# Patient Record
Sex: MALE | Race: WHITE | NOT HISPANIC OR LATINO | Employment: OTHER | URBAN - METROPOLITAN AREA
[De-identification: names, ages, dates, MRNs, and addresses within clinical notes are randomized per-mention and may not be internally consistent; named-entity substitution may affect disease eponyms.]

---

## 2018-01-24 ENCOUNTER — OFFICE VISIT CONVERTED (OUTPATIENT)
Dept: FAMILY MEDICINE CLINIC | Facility: CLINIC | Age: 68
End: 2018-01-24
Attending: NURSE PRACTITIONER

## 2018-01-24 ENCOUNTER — CONVERSION ENCOUNTER (OUTPATIENT)
Dept: FAMILY MEDICINE CLINIC | Facility: CLINIC | Age: 68
End: 2018-01-24

## 2018-03-19 ENCOUNTER — OFFICE VISIT CONVERTED (OUTPATIENT)
Dept: FAMILY MEDICINE CLINIC | Facility: CLINIC | Age: 68
End: 2018-03-19
Attending: NURSE PRACTITIONER

## 2018-07-24 ENCOUNTER — OFFICE VISIT CONVERTED (OUTPATIENT)
Dept: FAMILY MEDICINE CLINIC | Facility: CLINIC | Age: 68
End: 2018-07-24
Attending: NURSE PRACTITIONER

## 2018-09-10 ENCOUNTER — OFFICE VISIT CONVERTED (OUTPATIENT)
Dept: NEUROSURGERY | Facility: CLINIC | Age: 68
End: 2018-09-10
Attending: PHYSICIAN ASSISTANT

## 2018-09-24 ENCOUNTER — OFFICE VISIT CONVERTED (OUTPATIENT)
Dept: FAMILY MEDICINE CLINIC | Facility: CLINIC | Age: 68
End: 2018-09-24
Attending: NURSE PRACTITIONER

## 2018-09-24 ENCOUNTER — CONVERSION ENCOUNTER (OUTPATIENT)
Dept: FAMILY MEDICINE CLINIC | Facility: CLINIC | Age: 68
End: 2018-09-24

## 2018-11-12 ENCOUNTER — OFFICE VISIT CONVERTED (OUTPATIENT)
Dept: NEUROSURGERY | Facility: CLINIC | Age: 68
End: 2018-11-12
Attending: PHYSICIAN ASSISTANT

## 2019-01-14 ENCOUNTER — OFFICE VISIT CONVERTED (OUTPATIENT)
Dept: NEUROSURGERY | Facility: CLINIC | Age: 69
End: 2019-01-14
Attending: PHYSICIAN ASSISTANT

## 2019-01-22 ENCOUNTER — HOSPITAL ENCOUNTER (OUTPATIENT)
Dept: LAB | Facility: HOSPITAL | Age: 69
Discharge: HOME | End: 2019-01-22
Attending: NURSE PRACTITIONER

## 2019-01-22 LAB
ALBUMIN SERPL-MCNC: 4.4 G/DL (ref 3.5–5)
ALBUMIN/GLOB SERPL: 1.5 {RATIO} (ref 1.4–2.6)
ALP SERPL-CCNC: 79 U/L (ref 56–155)
ALT SERPL-CCNC: 16 U/L (ref 10–40)
ANION GAP SERPL CALC-SCNC: 19 MMOL/L (ref 8–19)
AST SERPL-CCNC: 20 U/L (ref 15–50)
BASOPHILS # BLD AUTO: 0.04 10*3/UL (ref 0–0.2)
BASOPHILS NFR BLD AUTO: 0.54 % (ref 0–3)
BILIRUB SERPL-MCNC: 0.52 MG/DL (ref 0.2–1.3)
BUN SERPL-MCNC: 11 MG/DL (ref 5–25)
BUN/CREAT SERPL: 12 {RATIO} (ref 6–20)
CALCIUM SERPL-MCNC: 9.6 MG/DL (ref 8.7–10.4)
CHLORIDE SERPL-SCNC: 106 MMOL/L (ref 99–111)
CHOLEST SERPL-MCNC: 125 MG/DL (ref 107–200)
CHOLEST/HDLC SERPL: 3.1 {RATIO} (ref 3–6)
CONV CO2: 21 MMOL/L (ref 22–32)
CONV TOTAL PROTEIN: 7.4 G/DL (ref 6.3–8.2)
CREAT UR-MCNC: 0.9 MG/DL (ref 0.7–1.2)
EOSINOPHIL # BLD AUTO: 0.19 10*3/UL (ref 0–0.7)
EOSINOPHIL # BLD AUTO: 2.3 % (ref 0–7)
ERYTHROCYTE [DISTWIDTH] IN BLOOD BY AUTOMATED COUNT: 13.5 % (ref 11.5–14.5)
EST. AVERAGE GLUCOSE BLD GHB EST-MCNC: 128 MG/DL
GFR SERPLBLD BASED ON 1.73 SQ M-ARVRAT: >60 ML/MIN/{1.73_M2}
GLOBULIN UR ELPH-MCNC: 3 G/DL (ref 2–3.5)
GLUCOSE SERPL-MCNC: 141 MG/DL (ref 70–99)
HBA1C MFR BLD: 14 G/DL (ref 14–18)
HBA1C MFR BLD: 6.1 % (ref 3.5–5.7)
HCT VFR BLD AUTO: 41.5 % (ref 42–52)
HDLC SERPL-MCNC: 40 MG/DL (ref 40–60)
LDLC SERPL CALC-MCNC: 59 MG/DL (ref 70–100)
LYMPHOCYTES # BLD AUTO: 1.75 10*3/UL (ref 1–5)
MCH RBC QN AUTO: 29.7 PG (ref 27–31)
MCHC RBC AUTO-ENTMCNC: 33.8 G/DL (ref 33–37)
MCV RBC AUTO: 87.8 FL (ref 80–96)
MONOCYTES # BLD AUTO: 1.06 10*3/UL (ref 0.2–1.2)
MONOCYTES NFR BLD AUTO: 13.2 % (ref 3–10)
NEUTROPHILS # BLD AUTO: 4.98 10*3/UL (ref 2–8)
NEUTROPHILS NFR BLD AUTO: 62.1 % (ref 30–85)
NRBC BLD AUTO-RTO: 0 % (ref 0–0.01)
OSMOLALITY SERPL CALC.SUM OF ELEC: 296 MOSM/KG (ref 273–304)
PLATELET # BLD AUTO: 187 10*3/UL (ref 130–400)
PMV BLD AUTO: 9.3 FL (ref 7.4–10.4)
POTASSIUM SERPL-SCNC: 4.2 MMOL/L (ref 3.5–5.3)
RBC # BLD AUTO: 4.72 10*6/UL (ref 4.7–6.1)
SODIUM SERPL-SCNC: 142 MMOL/L (ref 135–147)
T4 FREE SERPL-MCNC: 1.2 NG/DL (ref 0.9–1.8)
TRIGL SERPL-MCNC: 131 MG/DL (ref 40–150)
TSH SERPL-ACNC: 3 M[IU]/L (ref 0.27–4.2)
VARIANT LYMPHS NFR BLD MANUAL: 21.8 % (ref 20–45)
VLDLC SERPL-MCNC: 26 MG/DL (ref 5–37)
WBC # BLD AUTO: 8.02 10*3/UL (ref 4.8–10.8)

## 2019-02-05 ENCOUNTER — OFFICE VISIT CONVERTED (OUTPATIENT)
Dept: FAMILY MEDICINE CLINIC | Facility: CLINIC | Age: 69
End: 2019-02-05
Attending: NURSE PRACTITIONER

## 2019-02-05 ENCOUNTER — CONVERSION ENCOUNTER (OUTPATIENT)
Dept: FAMILY MEDICINE CLINIC | Facility: CLINIC | Age: 69
End: 2019-02-05

## 2019-03-25 ENCOUNTER — OFFICE VISIT CONVERTED (OUTPATIENT)
Dept: NEUROSURGERY | Facility: CLINIC | Age: 69
End: 2019-03-25
Attending: PHYSICIAN ASSISTANT

## 2019-04-18 ENCOUNTER — OFFICE VISIT CONVERTED (OUTPATIENT)
Dept: NEUROSURGERY | Facility: CLINIC | Age: 69
End: 2019-04-18
Attending: PHYSICIAN ASSISTANT

## 2019-04-25 ENCOUNTER — OFFICE VISIT CONVERTED (OUTPATIENT)
Dept: NEUROSURGERY | Facility: CLINIC | Age: 69
End: 2019-04-25
Attending: NEUROLOGICAL SURGERY

## 2019-04-25 ENCOUNTER — CONVERSION ENCOUNTER (OUTPATIENT)
Dept: NEUROLOGY | Facility: CLINIC | Age: 69
End: 2019-04-25

## 2019-08-01 ENCOUNTER — HOSPITAL ENCOUNTER (OUTPATIENT)
Dept: LAB | Facility: HOSPITAL | Age: 69
Discharge: HOME | End: 2019-08-01
Attending: NURSE PRACTITIONER

## 2019-08-01 LAB
ALBUMIN SERPL-MCNC: 4.4 G/DL (ref 3.5–5)
ALBUMIN/GLOB SERPL: 1.3 {RATIO} (ref 1.4–2.6)
ALP SERPL-CCNC: 83 U/L (ref 56–155)
ALT SERPL-CCNC: 19 U/L (ref 10–40)
ANION GAP SERPL CALC-SCNC: 23 MMOL/L (ref 8–19)
AST SERPL-CCNC: 27 U/L (ref 15–50)
BASOPHILS # BLD AUTO: 0.07 10*3/UL (ref 0–0.2)
BASOPHILS NFR BLD AUTO: 0.9 % (ref 0–3)
BILIRUB SERPL-MCNC: 0.47 MG/DL (ref 0.2–1.3)
BUN SERPL-MCNC: 15 MG/DL (ref 5–25)
BUN/CREAT SERPL: 19 {RATIO} (ref 6–20)
CALCIUM SERPL-MCNC: 9.6 MG/DL (ref 8.7–10.4)
CHLORIDE SERPL-SCNC: 101 MMOL/L (ref 99–111)
CHOLEST SERPL-MCNC: 130 MG/DL (ref 107–200)
CHOLEST/HDLC SERPL: 2.9 {RATIO} (ref 3–6)
CONV ABS IMM GRAN: 0.03 10*3/UL (ref 0–0.2)
CONV CO2: 20 MMOL/L (ref 22–32)
CONV CREATININE URINE, RANDOM: 90.7 MG/DL (ref 10–300)
CONV IMMATURE GRAN: 0.4 % (ref 0–1.8)
CONV MICROALBUM.,U,RANDOM: <12 MG/L (ref 0–20)
CONV TOTAL PROTEIN: 7.7 G/DL (ref 6.3–8.2)
CREAT UR-MCNC: 0.81 MG/DL (ref 0.7–1.2)
DEPRECATED RDW RBC AUTO: 45 FL (ref 35.1–43.9)
EOSINOPHIL # BLD AUTO: 0.23 10*3/UL (ref 0–0.7)
EOSINOPHIL # BLD AUTO: 2.8 % (ref 0–7)
ERYTHROCYTE [DISTWIDTH] IN BLOOD BY AUTOMATED COUNT: 13.9 % (ref 11.6–14.4)
EST. AVERAGE GLUCOSE BLD GHB EST-MCNC: 128 MG/DL
GFR SERPLBLD BASED ON 1.73 SQ M-ARVRAT: >60 ML/MIN/{1.73_M2}
GLOBULIN UR ELPH-MCNC: 3.3 G/DL (ref 2–3.5)
GLUCOSE SERPL-MCNC: 119 MG/DL (ref 70–99)
HBA1C MFR BLD: 13.9 G/DL (ref 14–18)
HBA1C MFR BLD: 6.1 % (ref 3.5–5.7)
HCT VFR BLD AUTO: 43.3 % (ref 42–52)
HDLC SERPL-MCNC: 45 MG/DL (ref 40–60)
LDLC SERPL CALC-MCNC: 67 MG/DL (ref 70–100)
LYMPHOCYTES # BLD AUTO: 1.93 10*3/UL (ref 1–5)
MCH RBC QN AUTO: 28.7 PG (ref 27–31)
MCHC RBC AUTO-ENTMCNC: 32.1 G/DL (ref 33–37)
MCV RBC AUTO: 89.3 FL (ref 80–96)
MICROALBUMIN/CREAT UR: 13.2 MG/G{CRE} (ref 0–25)
MONOCYTES # BLD AUTO: 1.13 10*3/UL (ref 0.2–1.2)
MONOCYTES NFR BLD AUTO: 13.9 % (ref 3–10)
NEUTROPHILS # BLD AUTO: 4.76 10*3/UL (ref 2–8)
NEUTROPHILS NFR BLD AUTO: 58.3 % (ref 30–85)
NRBC CBCN: 0 % (ref 0–0.7)
OSMOLALITY SERPL CALC.SUM OF ELEC: 292 MOSM/KG (ref 273–304)
PLATELET # BLD AUTO: 212 10*3/UL (ref 130–400)
PMV BLD AUTO: 12.2 FL (ref 9.4–12.4)
POTASSIUM SERPL-SCNC: 4.2 MMOL/L (ref 3.5–5.3)
PSA SERPL-MCNC: 0.1 NG/ML (ref 0–4)
RBC # BLD AUTO: 4.85 10*6/UL (ref 4.7–6.1)
SODIUM SERPL-SCNC: 140 MMOL/L (ref 135–147)
T4 FREE SERPL-MCNC: 1 NG/DL (ref 0.9–1.8)
TRIGL SERPL-MCNC: 92 MG/DL (ref 40–150)
TSH SERPL-ACNC: 4.33 M[IU]/L (ref 0.27–4.2)
VARIANT LYMPHS NFR BLD MANUAL: 23.7 % (ref 20–45)
VLDLC SERPL-MCNC: 18 MG/DL (ref 5–37)
WBC # BLD AUTO: 8.15 10*3/UL (ref 4.8–10.8)

## 2019-08-05 ENCOUNTER — OFFICE VISIT CONVERTED (OUTPATIENT)
Dept: FAMILY MEDICINE CLINIC | Facility: CLINIC | Age: 69
End: 2019-08-05
Attending: NURSE PRACTITIONER

## 2020-01-30 ENCOUNTER — HOSPITAL ENCOUNTER (OUTPATIENT)
Dept: LAB | Facility: HOSPITAL | Age: 70
Discharge: HOME | End: 2020-01-30
Attending: NURSE PRACTITIONER

## 2020-01-30 LAB
ALBUMIN SERPL-MCNC: 4.2 G/DL (ref 3.5–5)
ALBUMIN/GLOB SERPL: 1.4 {RATIO} (ref 1.4–2.6)
ALP SERPL-CCNC: 75 U/L (ref 56–155)
ALT SERPL-CCNC: 15 U/L (ref 10–40)
ANION GAP SERPL CALC-SCNC: 17 MMOL/L (ref 8–19)
AST SERPL-CCNC: 23 U/L (ref 15–50)
BASOPHILS # BLD AUTO: 0.06 10*3/UL (ref 0–0.2)
BASOPHILS NFR BLD AUTO: 0.7 % (ref 0–3)
BILIRUB SERPL-MCNC: 0.5 MG/DL (ref 0.2–1.3)
BUN SERPL-MCNC: 15 MG/DL (ref 5–25)
BUN/CREAT SERPL: 18 {RATIO} (ref 6–20)
CALCIUM SERPL-MCNC: 9 MG/DL (ref 8.7–10.4)
CHLORIDE SERPL-SCNC: 103 MMOL/L (ref 99–111)
CHOLEST SERPL-MCNC: 113 MG/DL (ref 107–200)
CHOLEST/HDLC SERPL: 3 {RATIO} (ref 3–6)
CONV ABS IMM GRAN: 0.03 10*3/UL (ref 0–0.2)
CONV CO2: 23 MMOL/L (ref 22–32)
CONV IMMATURE GRAN: 0.3 % (ref 0–1.8)
CONV TOTAL PROTEIN: 7.1 G/DL (ref 6.3–8.2)
CREAT UR-MCNC: 0.85 MG/DL (ref 0.7–1.2)
DEPRECATED RDW RBC AUTO: 46.5 FL (ref 35.1–43.9)
EOSINOPHIL # BLD AUTO: 0.24 10*3/UL (ref 0–0.7)
EOSINOPHIL # BLD AUTO: 2.6 % (ref 0–7)
ERYTHROCYTE [DISTWIDTH] IN BLOOD BY AUTOMATED COUNT: 14.1 % (ref 11.6–14.4)
EST. AVERAGE GLUCOSE BLD GHB EST-MCNC: 126 MG/DL
GFR SERPLBLD BASED ON 1.73 SQ M-ARVRAT: >60 ML/MIN/{1.73_M2}
GLOBULIN UR ELPH-MCNC: 2.9 G/DL (ref 2–3.5)
GLUCOSE SERPL-MCNC: 104 MG/DL (ref 70–99)
HBA1C MFR BLD: 6 % (ref 3.5–5.7)
HCT VFR BLD AUTO: 42.2 % (ref 42–52)
HDLC SERPL-MCNC: 38 MG/DL (ref 40–60)
HGB BLD-MCNC: 13.5 G/DL (ref 14–18)
LDLC SERPL CALC-MCNC: 54 MG/DL (ref 70–100)
LYMPHOCYTES # BLD AUTO: 1.7 10*3/UL (ref 1–5)
LYMPHOCYTES NFR BLD AUTO: 18.6 % (ref 20–45)
MCH RBC QN AUTO: 28.7 PG (ref 27–31)
MCHC RBC AUTO-ENTMCNC: 32 G/DL (ref 33–37)
MCV RBC AUTO: 89.8 FL (ref 80–96)
MONOCYTES # BLD AUTO: 1.17 10*3/UL (ref 0.2–1.2)
MONOCYTES NFR BLD AUTO: 12.8 % (ref 3–10)
NEUTROPHILS # BLD AUTO: 5.96 10*3/UL (ref 2–8)
NEUTROPHILS NFR BLD AUTO: 65 % (ref 30–85)
NRBC CBCN: 0 % (ref 0–0.7)
OSMOLALITY SERPL CALC.SUM OF ELEC: 289 MOSM/KG (ref 273–304)
PLATELET # BLD AUTO: 200 10*3/UL (ref 130–400)
PMV BLD AUTO: 12 FL (ref 9.4–12.4)
POTASSIUM SERPL-SCNC: 4.2 MMOL/L (ref 3.5–5.3)
RBC # BLD AUTO: 4.7 10*6/UL (ref 4.7–6.1)
SODIUM SERPL-SCNC: 139 MMOL/L (ref 135–147)
T4 FREE SERPL-MCNC: 1.1 NG/DL (ref 0.9–1.8)
TRIGL SERPL-MCNC: 103 MG/DL (ref 40–150)
TSH SERPL-ACNC: 3.41 M[IU]/L (ref 0.27–4.2)
VLDLC SERPL-MCNC: 21 MG/DL (ref 5–37)
WBC # BLD AUTO: 9.16 10*3/UL (ref 4.8–10.8)

## 2020-02-05 ENCOUNTER — OFFICE VISIT CONVERTED (OUTPATIENT)
Dept: FAMILY MEDICINE CLINIC | Facility: CLINIC | Age: 70
End: 2020-02-05
Attending: NURSE PRACTITIONER

## 2020-03-25 ENCOUNTER — HOSPITAL ENCOUNTER (OUTPATIENT)
Dept: GENERAL RADIOLOGY | Facility: HOSPITAL | Age: 70
Discharge: HOME | End: 2020-03-25
Attending: INTERNAL MEDICINE

## 2020-06-17 ENCOUNTER — HOSPITAL ENCOUNTER (OUTPATIENT)
Dept: LAB | Facility: HOSPITAL | Age: 70
Discharge: HOME | End: 2020-06-17
Attending: NURSE PRACTITIONER

## 2020-06-17 LAB
ALBUMIN SERPL-MCNC: 4.5 G/DL (ref 3.5–5)
ALBUMIN/GLOB SERPL: 1.3 {RATIO} (ref 1.4–2.6)
ALP SERPL-CCNC: 92 U/L (ref 56–155)
ALT SERPL-CCNC: 19 U/L (ref 10–40)
ANION GAP SERPL CALC-SCNC: 23 MMOL/L (ref 8–19)
AST SERPL-CCNC: 25 U/L (ref 15–50)
BASOPHILS # BLD AUTO: 0.06 10*3/UL (ref 0–0.2)
BASOPHILS NFR BLD AUTO: 0.7 % (ref 0–3)
BILIRUB SERPL-MCNC: 0.42 MG/DL (ref 0.2–1.3)
BUN SERPL-MCNC: 11 MG/DL (ref 5–25)
BUN/CREAT SERPL: 12 {RATIO} (ref 6–20)
CALCIUM SERPL-MCNC: 9.7 MG/DL (ref 8.7–10.4)
CHLORIDE SERPL-SCNC: 103 MMOL/L (ref 99–111)
CHOLEST SERPL-MCNC: 129 MG/DL (ref 107–200)
CHOLEST/HDLC SERPL: 3.1 {RATIO} (ref 3–6)
CONV ABS IMM GRAN: 0.02 10*3/UL (ref 0–0.2)
CONV CO2: 18 MMOL/L (ref 22–32)
CONV CREATININE URINE, RANDOM: 40.5 MG/DL (ref 10–300)
CONV IMMATURE GRAN: 0.2 % (ref 0–1.8)
CONV MICROALBUM.,U,RANDOM: <12 MG/L (ref 0–20)
CONV TOTAL PROTEIN: 8.1 G/DL (ref 6.3–8.2)
CREAT UR-MCNC: 0.89 MG/DL (ref 0.7–1.2)
DEPRECATED RDW RBC AUTO: 46.7 FL (ref 35.1–43.9)
EOSINOPHIL # BLD AUTO: 0.14 10*3/UL (ref 0–0.7)
EOSINOPHIL # BLD AUTO: 1.7 % (ref 0–7)
ERYTHROCYTE [DISTWIDTH] IN BLOOD BY AUTOMATED COUNT: 14.3 % (ref 11.6–14.4)
EST. AVERAGE GLUCOSE BLD GHB EST-MCNC: 146 MG/DL
GFR SERPLBLD BASED ON 1.73 SQ M-ARVRAT: >60 ML/MIN/{1.73_M2}
GLOBULIN UR ELPH-MCNC: 3.6 G/DL (ref 2–3.5)
GLUCOSE SERPL-MCNC: 117 MG/DL (ref 70–99)
HBA1C MFR BLD: 6.7 % (ref 3.5–5.7)
HCT VFR BLD AUTO: 45.8 % (ref 42–52)
HDLC SERPL-MCNC: 41 MG/DL (ref 40–60)
HGB BLD-MCNC: 14.2 G/DL (ref 14–18)
LDLC SERPL CALC-MCNC: 64 MG/DL (ref 70–100)
LYMPHOCYTES # BLD AUTO: 1.62 10*3/UL (ref 1–5)
LYMPHOCYTES NFR BLD AUTO: 19.8 % (ref 20–45)
MCH RBC QN AUTO: 27.7 PG (ref 27–31)
MCHC RBC AUTO-ENTMCNC: 31 G/DL (ref 33–37)
MCV RBC AUTO: 89.3 FL (ref 80–96)
MICROALBUMIN/CREAT UR: 29.6 MG/G{CRE} (ref 0–25)
MONOCYTES # BLD AUTO: 0.97 10*3/UL (ref 0.2–1.2)
MONOCYTES NFR BLD AUTO: 11.9 % (ref 3–10)
NEUTROPHILS # BLD AUTO: 5.37 10*3/UL (ref 2–8)
NEUTROPHILS NFR BLD AUTO: 65.7 % (ref 30–85)
NRBC CBCN: 0 % (ref 0–0.7)
OSMOLALITY SERPL CALC.SUM OF ELEC: 290 MOSM/KG (ref 273–304)
PLATELET # BLD AUTO: 224 10*3/UL (ref 130–400)
PMV BLD AUTO: 11.8 FL (ref 9.4–12.4)
POTASSIUM SERPL-SCNC: 4.3 MMOL/L (ref 3.5–5.3)
PSA SERPL-MCNC: 0.07 NG/ML (ref 0–4)
RBC # BLD AUTO: 5.13 10*6/UL (ref 4.7–6.1)
SODIUM SERPL-SCNC: 140 MMOL/L (ref 135–147)
T4 FREE SERPL-MCNC: 1.1 NG/DL (ref 0.9–1.8)
TRIGL SERPL-MCNC: 119 MG/DL (ref 40–150)
TSH SERPL-ACNC: 3.35 M[IU]/L (ref 0.27–4.2)
VLDLC SERPL-MCNC: 24 MG/DL (ref 5–37)
WBC # BLD AUTO: 8.18 10*3/UL (ref 4.8–10.8)

## 2020-07-03 ENCOUNTER — HOSPITAL ENCOUNTER (OUTPATIENT)
Dept: URGENT CARE | Facility: CLINIC | Age: 70
Discharge: HOME | End: 2020-07-03
Attending: FAMILY MEDICINE

## 2020-08-05 ENCOUNTER — CONVERSION ENCOUNTER (OUTPATIENT)
Dept: FAMILY MEDICINE CLINIC | Facility: CLINIC | Age: 70
End: 2020-08-05

## 2020-08-05 ENCOUNTER — HOSPITAL ENCOUNTER (OUTPATIENT)
Dept: LAB | Facility: HOSPITAL | Age: 70
Discharge: HOME | End: 2020-08-05
Attending: SPECIALIST

## 2020-08-05 ENCOUNTER — OFFICE VISIT CONVERTED (OUTPATIENT)
Dept: FAMILY MEDICINE CLINIC | Facility: CLINIC | Age: 70
End: 2020-08-05
Attending: NURSE PRACTITIONER

## 2020-08-05 LAB
ALBUMIN SERPL-MCNC: 4.1 G/DL (ref 3.5–5)
ALBUMIN/GLOB SERPL: 1.1 {RATIO} (ref 1.4–2.6)
ALP SERPL-CCNC: 80 U/L (ref 56–155)
ALT SERPL-CCNC: 19 U/L (ref 10–40)
ANION GAP SERPL CALC-SCNC: 22 MMOL/L (ref 8–19)
AST SERPL-CCNC: 28 U/L (ref 15–50)
BILIRUB SERPL-MCNC: 0.61 MG/DL (ref 0.2–1.3)
BUN SERPL-MCNC: 11 MG/DL (ref 5–25)
BUN/CREAT SERPL: 12 {RATIO} (ref 6–20)
CALCIUM SERPL-MCNC: 10 MG/DL (ref 8.7–10.4)
CHLORIDE SERPL-SCNC: 101 MMOL/L (ref 99–111)
CHOLEST SERPL-MCNC: 129 MG/DL (ref 107–200)
CHOLEST/HDLC SERPL: 3.1 {RATIO} (ref 3–6)
CONV CO2: 20 MMOL/L (ref 22–32)
CONV TOTAL PROTEIN: 7.7 G/DL (ref 6.3–8.2)
CREAT UR-MCNC: 0.9 MG/DL (ref 0.7–1.2)
GFR SERPLBLD BASED ON 1.73 SQ M-ARVRAT: >60 ML/MIN/{1.73_M2}
GLOBULIN UR ELPH-MCNC: 3.6 G/DL (ref 2–3.5)
GLUCOSE SERPL-MCNC: 104 MG/DL (ref 70–99)
HDLC SERPL-MCNC: 42 MG/DL (ref 40–60)
LDLC SERPL CALC-MCNC: 71 MG/DL (ref 70–100)
OSMOLALITY SERPL CALC.SUM OF ELEC: 288 MOSM/KG (ref 273–304)
POTASSIUM SERPL-SCNC: 4.1 MMOL/L (ref 3.5–5.3)
SODIUM SERPL-SCNC: 139 MMOL/L (ref 135–147)
TRIGL SERPL-MCNC: 81 MG/DL (ref 40–150)
VLDLC SERPL-MCNC: 16 MG/DL (ref 5–37)

## 2020-08-06 LAB
EST. AVERAGE GLUCOSE BLD GHB EST-MCNC: 140 MG/DL
HBA1C MFR BLD: 6.5 % (ref 3.5–5.7)

## 2020-09-02 ENCOUNTER — OFFICE VISIT CONVERTED (OUTPATIENT)
Dept: FAMILY MEDICINE CLINIC | Facility: CLINIC | Age: 70
End: 2020-09-02
Attending: NURSE PRACTITIONER

## 2020-09-02 ENCOUNTER — CONVERSION ENCOUNTER (OUTPATIENT)
Dept: FAMILY MEDICINE CLINIC | Facility: CLINIC | Age: 70
End: 2020-09-02

## 2020-10-26 ENCOUNTER — CONVERSION ENCOUNTER (OUTPATIENT)
Dept: FAMILY MEDICINE CLINIC | Facility: CLINIC | Age: 70
End: 2020-10-26

## 2020-10-26 ENCOUNTER — OFFICE VISIT CONVERTED (OUTPATIENT)
Dept: FAMILY MEDICINE CLINIC | Facility: CLINIC | Age: 70
End: 2020-10-26
Attending: NURSE PRACTITIONER

## 2021-01-11 ENCOUNTER — HOSPITAL ENCOUNTER (OUTPATIENT)
Dept: LAB | Facility: HOSPITAL | Age: 71
Discharge: HOME | End: 2021-01-11
Attending: NURSE PRACTITIONER

## 2021-01-11 LAB
ALBUMIN SERPL-MCNC: 4.5 G/DL (ref 3.5–5)
ALBUMIN/GLOB SERPL: 1.4 {RATIO} (ref 1.4–2.6)
ALP SERPL-CCNC: 86 U/L (ref 56–155)
ALT SERPL-CCNC: 22 U/L (ref 10–40)
ANION GAP SERPL CALC-SCNC: 16 MMOL/L (ref 8–19)
AST SERPL-CCNC: 28 U/L (ref 15–50)
BILIRUB SERPL-MCNC: 0.45 MG/DL (ref 0.2–1.3)
BUN SERPL-MCNC: 11 MG/DL (ref 5–25)
BUN/CREAT SERPL: 13 {RATIO} (ref 6–20)
CALCIUM SERPL-MCNC: 9.6 MG/DL (ref 8.7–10.4)
CHLORIDE SERPL-SCNC: 104 MMOL/L (ref 99–111)
CHOLEST SERPL-MCNC: 135 MG/DL (ref 107–200)
CHOLEST/HDLC SERPL: 2.8 {RATIO} (ref 3–6)
CONV CO2: 23 MMOL/L (ref 22–32)
CONV CREATININE URINE, RANDOM: 39.5 MG/DL (ref 10–300)
CONV MICROALBUM.,U,RANDOM: <12 MG/L (ref 0–20)
CONV TOTAL PROTEIN: 7.8 G/DL (ref 6.3–8.2)
CREAT UR-MCNC: 0.87 MG/DL (ref 0.7–1.2)
EST. AVERAGE GLUCOSE BLD GHB EST-MCNC: 128 MG/DL
GFR SERPLBLD BASED ON 1.73 SQ M-ARVRAT: >60 ML/MIN/{1.73_M2}
GLOBULIN UR ELPH-MCNC: 3.3 G/DL (ref 2–3.5)
GLUCOSE SERPL-MCNC: 93 MG/DL (ref 70–99)
HBA1C MFR BLD: 6.1 % (ref 3.5–5.7)
HDLC SERPL-MCNC: 48 MG/DL (ref 40–60)
LDLC SERPL CALC-MCNC: 68 MG/DL (ref 70–100)
MICROALBUMIN/CREAT UR: 30.4 MG/G{CRE} (ref 0–25)
OSMOLALITY SERPL CALC.SUM OF ELEC: 287 MOSM/KG (ref 273–304)
POTASSIUM SERPL-SCNC: 4 MMOL/L (ref 3.5–5.3)
SODIUM SERPL-SCNC: 139 MMOL/L (ref 135–147)
TRIGL SERPL-MCNC: 93 MG/DL (ref 40–150)
VLDLC SERPL-MCNC: 19 MG/DL (ref 5–37)

## 2021-01-21 ENCOUNTER — HOSPITAL ENCOUNTER (OUTPATIENT)
Dept: OTHER | Facility: HOSPITAL | Age: 71
Discharge: HOME | End: 2021-01-21
Attending: INTERNAL MEDICINE

## 2021-02-05 ENCOUNTER — CONVERSION ENCOUNTER (OUTPATIENT)
Dept: FAMILY MEDICINE CLINIC | Facility: CLINIC | Age: 71
End: 2021-02-05

## 2021-02-05 ENCOUNTER — OFFICE VISIT CONVERTED (OUTPATIENT)
Dept: FAMILY MEDICINE CLINIC | Facility: CLINIC | Age: 71
End: 2021-02-05
Attending: NURSE PRACTITIONER

## 2021-02-19 ENCOUNTER — HOSPITAL ENCOUNTER (OUTPATIENT)
Dept: VACCINE CLINIC | Facility: HOSPITAL | Age: 71
Discharge: HOME | End: 2021-02-19
Attending: INTERNAL MEDICINE

## 2021-03-11 ENCOUNTER — OFFICE VISIT CONVERTED (OUTPATIENT)
Dept: SURGERY | Facility: CLINIC | Age: 71
End: 2021-03-11
Attending: NURSE PRACTITIONER

## 2021-03-31 ENCOUNTER — HOSPITAL ENCOUNTER (OUTPATIENT)
Dept: GASTROENTEROLOGY | Facility: HOSPITAL | Age: 71
Setting detail: HOSPITAL OUTPATIENT SURGERY
Discharge: HOME | End: 2021-03-31
Attending: SURGERY

## 2021-03-31 LAB — GLUCOSE BLD-MCNC: 116 MG/DL (ref 70–99)

## 2021-05-10 NOTE — H&P
History and Physical      Patient Name: Taqueria Soto   Patient ID: [id number]   Sex: Male   YOB: 1950    Primary Care Provider: Edgar KENDALL   Referring Provider: Edgar KENDALL    Visit Date: March 11, 2021    Provider: ENOCH Tuttle   Location: [facility]   Location Address: [address] [place] [address]  [id number]   Location Phone: [phone]          Chief Complaint  · Requesting colonoscopy  · Age 50 or over      History Of Present Illness  The patient is a 71 year old /White male presenting to the Surgical Specialist office on a referral from Edgar KENDALL.   Taqueria Soto needs to have a diagnostic colonoscopy.   Patient states that they have had a colonoscopy. 10 years ago   Patient currently complains of: abnormal stool study   Patient Does not have family history of colon cancer.      Patient presents today on referral from Edgar Jacome for positive Cologuard.  Patient denies any abdominal pain, change in bowel habit, or rectal bleeding.  Denies any family history of colorectal cancer.  Patient reports last colonoscopy was 10 years ago and had a few polyps removed.       Past Medical History  Disease Name Date Onset Notes   Arthritis --  --    Arthritis --  --    Cataract --  --    Cervical radiculopathy 01/14/2019 --    Cervicalgia 01/14/2019 --    Chronic Obstructive Pulmonary Disease --  --    COPD (chronic obstructive pulmonary disease) --  --    Deafness --  --    Diabetes --  --    Diabetes mellitus, insulin dependent (IDDM), controlled --  --    Diabetes mellitus, type 2 07/24/2018 --    Diabetes Mellitus, Type II --  --    Emphysema --  --    Essential hypertension 07/24/2018 --    Facet arthritis of cervical region 01/14/2019 --    Hematuria (Microscopic) --  --    Hemorrhoids --  --    High blood pressure --  --    High cholesterol --  --    Hyperlipidemia 07/24/2018 --    Hypertension --  --    Prostatitis, unspecified --   --    Reflux --  --    Shortness of Breath --  --    Sinus trouble --  --    Sleep apnea --  --          Past Surgical History  Procedure Name Date Notes   *I have had no surgeries --  --    Cataract exctraction with lens implant --  --          Medication List  Name Date Started Instructions   Alcohol Pads topical pads, medicated 02/05/2020 apply to affected area by topical route daily   Allegra Allergy 180 mg oral tablet 08/05/2020 take 1 tablet (180 mg) by oral route once daily for 90 days   aspirin 81 mg oral tablet,chewable 02/05/2021 chew 2 tablets by oral route daily for 90 days   atorvastatin 40 mg oral tablet 12/08/2020 take 1 tablet (40 mg) by oral route once daily at bedtime for 90 days   Betimol 0.5 % ophthalmic (eye) drops  instill 1 drop into affected eye(s) by ophthalmic route 2 times per day   brimonidine 0.15 % ophthalmic (eye) drops  instill 1 drop into affected eye(s) by ophthalmic route 3 times per day   cyclobenzaprine 10 mg oral tablet 12/08/2020 take 1 tablet by oral route 3 times a day as needed   finasteride 5 mg oral tablet 12/08/2020 take 1 tablet (5 mg) by oral route once daily   FreeStyle Lancets 28 gauge miscellaneous misc 12/16/2019 test sugars 4 times daily/dx: E11.9   gabapentin 300 mg oral capsule  take 1 capsule (300 mg) by oral route 3 times per day   ICaps AREDS2 250 mg-200 unit -12.5 mg-1 mg oral capsule  take 1 capsule by oral route 2 times a day   Janumet XR 50-1,000 mg oral tablet, ER multiphase 24 hr 12/08/2020 take 2 tablets by oral route once daily with the evening meal, swallowing whole. Do not crush, chew and/or divide. for 90 days   Lantus Solostar U-100 Insulin 100 unit/mL (3 mL) subcutaneous insulin pen 08/05/2020 inject 36 units by subcutaneous route once   Lotrel 10-20 mg oral capsule 08/05/2020 take 1 capsule by oral route once daily for 90 days   meloxicam 15 mg oral tablet 12/08/2020 take 1 tablet (15 mg) by oral route once daily for 90 days   multivitamin oral  "tablet  take 1 tablet by oral route daily   omeprazole 40 mg oral capsule,delayed release(DR/EC) 08/05/2020 take 1 capsule (40 mg) by oral route once daily before a meal   pen needle, diabetic 32 gauge x 3/16\" miscellaneous needle 08/05/2019 Test 4 times daily/DX: E11.9/DM type 2   Precision Xtra Test miscellaneous strip 02/05/2021 Test 4 times daily/DX: E11.9   PreserVision AREDS-2 [id number] mg-unit-mg-mg oral capsule  take 1 capsule by oral route 2 times a day   Singulair 10 mg oral tablet 12/08/2020 take 1 tablet (10 mg) by oral route once daily in the evening   tamsulosin 0.4 mg oral capsule 12/08/2020 TAKE 1 CAPSULE BY MOUTH ONCE DAILY for 30 days         Allergy List  Allergen Name Date Reaction Notes   NO KNOWN DRUG ALLERGIES --  --  --        Allergies Reconciled  Family Medical History  Disease Name Relative/Age Notes   Diabetes, unspecified type Sister/   Sister   No family history of colorectal cancer  --    Family history of Arthritis Father/   Father   Family history of diabetes mellitus Sister/   Sister         Social History  Finding Status Start/Stop Quantity Notes   Alcohol Current some day --/-- --  03/11/2021 - occasionally drinks, less than 1 drink per day, has been drinking for 31 or more years   lives with spouse --  --/-- --  --     --  --/-- --  --    Retired --  --/-- --  --    Tobacco Former --/-- --  former smoker         Review of Systems  · Constitutional  o Denies  o : fever, chills  · Eyes  o Denies  o : yellowish discoloration of eyes  · HENT  o Denies  o : difficulty swallowing  · Cardiovascular  o Denies  o : chest pain, chest pain on exertion  · Respiratory  o Denies  o : shortness of breath  · Gastrointestinal  o Denies  o : nausea, vomiting, diarrhea, constipation  · Genitourinary  o Denies  o : abnormal color of urine  · Integument  o Denies  o : rash  · Neurologic  o Denies  o : tingling or numbness  · Musculoskeletal  o Denies  o : joint " "pain  · Endocrine  o Denies  o : weight gain, weight loss      Vitals  Date Time BP Position Site L\R Cuff Size HR RR TEMP (F) WT  HT  BMI kg/m2 BSA m2 O2 Sat FR L/min FiO2 HC       03/11/2021 01:29 PM         262lbs 8oz 5'  10\" 37.66 2.43             Physical Examination  · Constitutional  o Appearance  o : well developed, well-nourished, patient in no apparent distress  · Head and Face  o Head  o :   § Inspection  § : atraumatic, normocephalic  o Face  o :   § Inspection  § : no facial lesions  · Eyes  o Conjunctivae  o : conjunctivae normal  o Sclerae  o : sclerae white  · Neck  o Inspection/Palpation  o : normal appearance, no masses or tenderness, trachea midline  · Respiratory  o Respiratory Effort  o : breathing unlabored  · Skin and Subcutaneous Tissue  o General Inspection  o : no lesions present, no areas of discoloration, skin turgor normal, texture normal  · Neurologic  o Mental Status Examination  o :   § Orientation  § : grossly oriented to person, place and time  § Attention  § : attention normal, concentration abilities normal  § Fund of Knowledge  § : fund of knowledge within normal limits, patient aware of current events  o Gait and Station  o : normal gait, able to stand without difficulty  · Psychiatric  o Judgement and Insight  o : judgment and insight intact  o Mood and Affect  o : mood normal, affect appropriate              Assessment  · Abnormal stool test     792.1/R19.5    Problems Reconciled  Plan  · Orders  o Consent for Colonoscopy with Possible Biopsy - Possible risks/complications, benefits, and alternatives to surgical or invasive procedure have been explained to patient and/or legal guardian. -Patient has been evaluated and can tolerate anesthesia and/or sedation. Risks, benefits, and alternatives to anesthesia and sedation have been explained to patient and/or legal guardian. (94181) - 792.1/R19.5 - 03/31/2021  · Medications  o Medications have been Reconciled  o Transition of Care " or Provider Policy  · Instructions  o Surgical Facility: [facility]  o Handouts Provided Pre-Procedure Instructions including date, time, and location of procedure.   o PLAN: Proceeed with colonoscopy. Patient understands risks/benefits and is willing to proceed.   o ***Surgical Orders***  o RISK AND BENEFITS:  o Given these options, the patient has verbally expressed an understanding of the risks of the surgery and finds these risks acceptable. Will proceed with surgery as soon as possible.  o O.R. PREP: Per protocol   o IV: Per Anesthesia  o Please sign permit for: Colonoscopy with possible biopsies by Dr. Hinojosa.   o The above History and Physical Examination has been completed within 30 days of admission.  o ***Patient Status***  o Outpatient  o Follow up in the in the office post procedure.  o Electronically Identified Patient Education Materials Provided Electronically            Electronically Signed by: ENOCH Tuttle -Author on March 11, 2021 02:18:00 PM

## 2021-05-13 NOTE — PROGRESS NOTES
Progress Note      Patient Name: Taqueria Soto   Patient ID: [id number]   Sex: Male   YOB: 1950    Primary Care Provider: Edgar KENDALL   Referring Provider: Edgar KENDALL    Visit Date: October 26, 2020    Provider: ENOCH Hernandez   Location: [facility]   Location Address: [address] [place] [address], [address]  [place], [place]  [id number]   Location Phone: [phone]          Chief Complaint  · left ear pain  · pt has hearing aids and his ears do get full at times. His left ear has been hurting x 3 days. It pops and bubbles  · he has tried to irrigate with no success      History Of Present Illness  Taqueria Soto is a 70 year old /White male who presents for evaluation and treatment of: pt complained L ear felt like it was popping and bubbles. He has tried to clean out the ear himself. no pain or dizziness, drainage.       Past Medical History  Disease Name Date Onset Notes   Arthritis --  --    Cataract --  --    Cervical radiculopathy 01/14/2019 --    Cervicalgia 01/14/2019 --    COPD (chronic obstructive pulmonary disease) --  --    Deafness --  --    Diabetes mellitus, insulin dependent (IDDM), controlled --  --    Diabetes mellitus, type 2 07/24/2018 --    Diabetes Mellitus, Type II --  --    Emphysema --  --    Essential hypertension 07/24/2018 --    Facet arthritis of cervical region 01/14/2019 --    Hematuria (Microscopic) --  --    Hemorrhoids --  --    High cholesterol --  --    Hyperlipidemia 07/24/2018 --    Hypertension --  --    Prostatitis, unspecified --  --    Reflux --  --    Shortness of Breath --  --    Sinus trouble --  --    Sleep apnea --  --          Past Surgical History  Procedure Name Date Notes   Cataract exctraction with lens implant --  --          Medication List  Name Date Started Instructions   Alcohol Pads topical pads, medicated 02/05/2020 apply to affected area by topical route daily   Allegra Allergy 180 mg oral tablet  "08/05/2020 take 1 tablet (180 mg) by oral route once daily for 90 days   aspirin 81 mg oral tablet,chewable 08/05/2020 chew 1 tablet (81 mg) by oral route once daily for 90 days   atorvastatin 40 mg oral tablet 08/05/2020 take 1 tablet (40 mg) by oral route once daily at bedtime for 90 days   Betimol 0.5 % ophthalmic (eye) drops  instill 1 drop into affected eye(s) by ophthalmic route 2 times per day   brimonidine 0.15 % ophthalmic (eye) drops  instill 1 drop into affected eye(s) by ophthalmic route 3 times per day   cyclobenzaprine 10 mg oral tablet  take 1 tablet (10 mg) by oral route 3 times per day   FreeStyle Lancets 28 gauge miscellaneous misc 12/16/2019 test sugars 4 times daily/dx: E11.9   gabapentin 300 mg oral capsule  take 1 capsule (300 mg) by oral route 3 times per day   Janumet XR 50-1,000 mg oral tablet, ER multiphase 24 hr 08/05/2020 take 2 tablets by oral route once daily with the evening meal, swallowing whole. Do not crush, chew and/or divide. for 90 days   Lantus Solostar U-100 Insulin 100 unit/mL (3 mL) subcutaneous insulin pen 08/05/2020 inject 36 units by subcutaneous route once   Lotrel 10-20 mg oral capsule 08/05/2020 take 1 capsule by oral route once daily for 90 days   meloxicam 15 mg oral tablet 08/05/2020 take 1 tablet (15 mg) by oral route once daily for 90 days   omeprazole 40 mg oral capsule,delayed release(DR/EC) 08/05/2020 take 1 capsule (40 mg) by oral route once daily before a meal   pen needle, diabetic 32 gauge x 3/16\" miscellaneous needle 08/05/2019 Test 4 times daily/DX: E11.9/DM type 2   Precision Xtra Test miscellaneous strip 12/16/2019 Test 4 times daily/DX: E11.9   PreserVision AREDS-2 [id number] mg-unit-mg-mg oral capsule  take 1 capsule by oral route 2 times a day   Singulair 10 mg oral tablet 08/05/2020 take 1 tablet (10 mg) by oral route once daily in the evening   tamsulosin 0.4 mg oral capsule 08/05/2020 TAKE 1 CAPSULE BY MOUTH ONCE DAILY for 30 days "         Allergy List  Allergen Name Date Reaction Notes   NO KNOWN DRUG ALLERGIES --  --  --          Family Medical History  Disease Name Relative/Age Notes   Family history of Arthritis Father/   Father   Family history of diabetes mellitus Sister/   Sister         Social History  Finding Status Start/Stop Quantity Notes   Alcohol Current some day --/-- --  occasionally drinks, less than 1 drink per day, has been drinking for 31 or more years   lives with spouse --  --/-- --  --     --  --/-- --  --    Retired --  --/-- --  --    Tobacco Former --/-- --  former smoker         Immunizations  NameDate Admin Mfg Trade Name Lot Number Route Inj VIS Given VIS Publication   [id number]/2019 SK[employer] Fluarix, quadrivalent, preservative free [id number] NE NE 08/05/2020    Comments: [employer]         Review of Systems  · Constitutional  o Denies  o : fatigue, night sweats  · Eyes  o Denies  o : double vision, blurred vision  · HENT  o Admits  o : L ear full and feels like there were bubbles. he wears bilateral hearing aids and it affects his hearing  o Denies  o : vertigo, recent head injury, pain  · Cardiovascular  o Denies  o : chest pain, irregular heart beats  · Respiratory  o Denies  o : shortness of breath, productive cough  · Gastrointestinal  o Denies  o : nausea, vomiting  · Genitourinary  o Denies  o : dysuria, urinary retention  · Integument  o Denies  o : hair growth change, new skin lesions  · Neurologic  o Denies  o : altered mental status, seizures  · Musculoskeletal  o Denies  o : joint swelling, limitation of motion  · Endocrine  o Denies  o : cold intolerance, heat intolerance  · Heme-Lymph  o Denies  o : petechiae, lymph node enlargement or tenderness  · Allergic-Immunologic  o Denies  o : frequent illnesses      Vitals  Date Time BP Position Site L\R Cuff Size HR RR TEMP (F) WT  HT  BMI kg/m2 BSA m2 O2 Sat FR L/min FiO2 HC       10/26/2020 02:34 /71 Sitting    80 - R  96.8 263lbs 5oz    97 %             Physical Examination  · Constitutional  o Appearance  o : well-nourished, well developed, alert, in no acute distress  · Head and Face  o Face  o :   § Palpation  § : no sinus tenderness on palpation  · Ears, Nose, Mouth and Throat  o Ears  o : external ear auricle normal, otic canal normal, TM with no reddness, L ear occluded, retraction  o Nose  o : external normal, nasal mucosa normal, turbinates normal  o Oral Cavity  o : tongue no lesion, oral mucosa normal  o Throat  o : no erythemia, exudate or lesions  · Neck  o Inspection/Palpation  o : normal appearance, no masses or tenderness, trachea midline, no enlarged cervical or supraclavicular lymphnodes palpated  o Thyroid  o : gland size normal, nontender, no nodules or masses present on palpation, thyroid motion normal during swallowing  · Respiratory  o Respiratory Effort  o : breathing unlabored  o Auscultation of Lungs  o : normal breath sounds throughout  · Cardiovascular  o Heart  o :   § Auscultation of Heart  § : regular rate and rhythm without murmur  o Peripheral Vascular System  o :   § Extremities  § : no edema, no cyanosis, no distal hair loss, normal capillary refill  · Skin and Subcutaneous Tissue  o General Inspection  o : no rashes or lesions present, no areas of discoloration  · Neurologic  o Mental Status Examination  o : judgement, insight intact, modd and affect appropriate  o Motor Examination  o : strength grossly intact in all four extremities  o Gait and Station  o : normal gait, able to stand without difficulty     with pt permission NP with curette removed small amt cerumen. irrigated and remainder came out, TM clear, visible, no redness           Assessment  · Ear pain, left     388.70/H92.02  · Impacted cerumen of left ear     380.4/H61.22  · Hearing aid worn     V45.89/Z97.4      Plan  · Orders  o ACO-39: Current medications updated and reviewed (1159F, ) - - 10/26/2020  · Medications  o Medications have been  Reconciled  o Transition of Care or Provider Policy  · Instructions  o Patient was educated/instructed on their diagnosis, treatment and medications prior to discharge from the clinic today.  o suggest maybe once a month use ear wax softener drops to keep wax from building up  · Disposition  o Call or Return if symptoms worsen or persist.            Electronically Signed by: Vivien Gonzales APRN -Author on October 26, 2020 02:57:06 PM

## 2021-05-13 NOTE — PROGRESS NOTES
Progress Note      Patient Name: Taqueria Soto   Patient ID: [id number]   Sex: Male   YOB: 1950    Primary Care Provider: Edgar KENDALL   Referring Provider: Edgar KENDALL    Visit Date: September 2, 2020    Provider: ENOCH Rose   Location: [facility]   Location Address: [address] [place] [address], [address] [place] [address]  [id number]   Location Phone: [phone]          Chief Complaint  · Bilateral ear pain and fullness      History Of Present Illness  Taqueria Soto is a 70 year old /White male who presents for evaluation and treatment of:      Patient presents to the office today with concerns of cerumen in his ears bilaterally.  Patient was recently seen by his audiologist for his hearing aids and they had discussed his cerumen impaction bilaterally.  Patient denies any pain at this time.       Past Medical History  Disease Name Date Onset Notes   Arthritis --  --    Cataract --  --    Cervical radiculopathy 01/14/2019 --    Cervicalgia 01/14/2019 --    COPD (chronic obstructive pulmonary disease) --  --    Deafness --  --    Diabetes mellitus, insulin dependent (IDDM), controlled --  --    Diabetes mellitus, type 2 07/24/2018 --    Diabetes Mellitus, Type II --  --    Emphysema --  --    Essential hypertension 07/24/2018 --    Facet arthritis of cervical region 01/14/2019 --    Hematuria (Microscopic) --  --    Hemorrhoids --  --    High cholesterol --  --    Hyperlipidemia 07/24/2018 --    Hypertension --  --    Prostatitis, unspecified --  --    Reflux --  --    Shortness of Breath --  --    Sinus trouble --  --    Sleep apnea --  --          Past Surgical History  Procedure Name Date Notes   Cataract exctraction with lens implant --  --          Medication List  Name Date Started Instructions   Alcohol Pads topical pads, medicated 02/05/2020 apply to affected area by topical route daily   Allegra Allergy 180 mg oral tablet 08/05/2020 take 1 tablet  "(180 mg) by oral route once daily for 90 days   aspirin 81 mg oral tablet,chewable 08/05/2020 chew 1 tablet (81 mg) by oral route once daily for 90 days   atorvastatin 40 mg oral tablet 08/05/2020 take 1 tablet (40 mg) by oral route once daily at bedtime for 90 days   Betimol 0.5 % ophthalmic (eye) drops  instill 1 drop into affected eye(s) by ophthalmic route 2 times per day   brimonidine 0.15 % ophthalmic (eye) drops  instill 1 drop into affected eye(s) by ophthalmic route 3 times per day   cyclobenzaprine 10 mg oral tablet 04/18/2019 take 1 tablet (10 mg) by oral route 2 times per day for 30 days   FreeStyle Lancets 28 gauge miscellaneous misc 12/16/2019 test sugars 4 times daily/dx: E11.9   gabapentin 300 mg oral capsule  take 1 capsule (300 mg) by oral route 3 times per day   Janumet XR 50-1,000 mg oral tablet, ER multiphase 24 hr 08/05/2020 take 2 tablets by oral route once daily with the evening meal, swallowing whole. Do not crush, chew and/or divide. for 90 days   Lantus Solostar U-100 Insulin 100 unit/mL (3 mL) subcutaneous insulin pen 08/05/2020 inject 36 units by subcutaneous route once   Lotrel 10-20 mg oral capsule 08/05/2020 take 1 capsule by oral route once daily for 90 days   meloxicam 15 mg oral tablet 08/05/2020 take 1 tablet (15 mg) by oral route once daily for 90 days   omeprazole 40 mg oral capsule,delayed release(DR/EC) 08/05/2020 take 1 capsule (40 mg) by oral route once daily before a meal   pen needle, diabetic 32 gauge x 3/16\" miscellaneous needle 08/05/2019 Test 4 times daily/DX: E11.9/DM type 2   Precision Xtra Test miscellaneous strip 12/16/2019 Test 4 times daily/DX: E11.9   PreserVision AREDS-2 [id number] mg-unit-mg-mg oral capsule  take 1 capsule by oral route 2 times a day   ProAir HFA 90 mcg/actuation inhalation HFA aerosol inhaler  inhale 1 puff (90 mcg) by inhalation route every 4 hours   Singulair 10 mg oral tablet 08/05/2020 take 1 tablet (10 mg) by oral route once daily in " the evening   tamsulosin 0.4 mg oral capsule 08/05/2020 TAKE 1 CAPSULE BY MOUTH ONCE DAILY for 30 days         Allergy List  Allergen Name Date Reaction Notes   NO KNOWN DRUG ALLERGIES --  --  --          Family Medical History  Disease Name Relative/Age Notes   Family history of Arthritis Father/   Father   Family history of diabetes mellitus Sister/   Sister         Social History  Finding Status Start/Stop Quantity Notes   Alcohol Current some day --/-- --  occasionally drinks, less than 1 drink per day, has been drinking for 31 or more years   lives with spouse --  --/-- --  --     --  --/-- --  --    Retired --  --/-- --  --    Tobacco Former --/-- --  former smoker         Immunizations  NameDate Admin Mfg Trade Name Lot Number Route Inj VIS Given VIS Publication   [id number]/01/2019 SKB Fluarix, quadrivalent, preservative free [id number] NE NE 08/05/2020    Comments: [employer]         Review of Systems  · Constitutional  o Denies  o : fatigue, night sweats  · Eyes  o Denies  o : double vision, blurred vision  · HENT  o Admits  o : ear pain, ear fullness  o Denies  o : vertigo, recent head injury  · Breasts  o Denies  o : abnormal changes in breast size, additional breast symptoms except as noted in the HPI  · Cardiovascular  o Denies  o : chest pain, irregular heart beats  · Respiratory  o Denies  o : shortness of breath, productive cough  · Gastrointestinal  o Denies  o : nausea, vomiting  · Genitourinary  o Denies  o : dysuria, urinary retention  · Integument  o Denies  o : hair growth change, new skin lesions  · Neurologic  o Denies  o : altered mental status, seizures  · Musculoskeletal  o Denies  o : joint swelling, limitation of motion  · Endocrine  o Denies  o : cold intolerance, heat intolerance  · Heme-Lymph  o Denies  o : petechiae, lymph node enlargement or tenderness  · Allergic-Immunologic  o Denies  o : frequent illnesses      Vitals  Date Time BP Position Site L\R Cuff Size HR RR TEMP (F) WT  " HT  BMI kg/m2 BSA m2 O2 Sat        09/02/2020 03:04 /84 Sitting    73 - R 18 98.5 261lbs 0oz 5'  11\" 36.4 2.44 97 %          Physical Examination  · Constitutional  o Appearance  o : well-nourished, in no acute distress  · Ears, Nose, Mouth and Throat  o Ears  o :   § External Ears  § : external auditory canal appearance within normal limits, no discharge present  § Otoscopic Examination  § : Cerumen impaction noted bilaterally  o Nose  o :   § External Nose  § : appearance normal  § Intranasal Exam  § : mucosa within normal limits, vestibules normal, no intranasal lesions present, septum midline, sinuses non tender to palpation  § Nasopharynx  § : no discharge present  o Oral Cavity  o :   § Oral Mucosa  § : oral mucosa normal  § Lips  § : lip appearance normal  § Teeth  § : normal dentation for age  o Throat  o :   § Oropharynx  § : no inflammation or lesions present, tonsils within normal limits  · Neck  o Inspection/Palpation  o : normal appearance, no masses or tenderness, trachea midline  · Respiratory  o Respiratory Effort  o : breathing unlabored  o Inspection of Chest  o : normal appearance  o Auscultation of Lungs  o : normal breath sounds throughout inspiration and expiration  · Cardiovascular  o Heart  o :   § Auscultation of Heart  § : regular rate and rhythm, no murmurs, gallops or rubs  o Peripheral Vascular System  o :   § Extremities  § : no clubbing or edema  · Skin and Subcutaneous Tissue  o General Inspection  o : no rashes or lesions present, no areas of discoloration  o Body Hair  o : hair normal for age, general body hair distribution normal for age  o Digits and Nails  o : no clubbing, cyanosis, deformities or edema present, normal appearing nails  · Neurologic  o Mental Status Examination  o :   § Orientation  § : grossly oriented to person, place and time  o Gait and Station  o : normal gait, able to stand without difficulty  · Psychiatric  o Judgement and Insight  o : judgment and " insight intact  o Mood and Affect  o : mood normal, affect appropriate  o Presence of Abnormal Thoughts  o : no hallucinations, no delusions present, no psychotic thoughts     After irrigation TMs without edema erythema or perforation.           Assessment  · Cerumen impaction     380.4/H61.20    Problems Reconciled  Plan  · Orders  o ACO-39: Current medications updated and reviewed () - - 09/02/2020  o 2 - Cerumen Removal by MA or Nurse, Unilateral [facility] ([id number]) - - 09/02/2020  · Medications  o Medications have been Reconciled  o Transition of Care or Provider Policy  · Instructions  o Patient was educated/instructed on their diagnosis, treatment and medications prior to discharge from the clinic today.  o Time spent with the patient was minutes, more than 50% face to face.  o Electronically Identified Patient Education Materials Provided Electronically  · Disposition  o Call or Return if symptoms worsen or persist.            Electronically Signed by: ENOCH Rose -Author on September 2, 2020 04:18:40 PM

## 2021-05-13 NOTE — PROGRESS NOTES
Progress Note      Patient Name: Taqueria Soto   Patient ID: [id number]   Sex: Male   YOB: 1950    Primary Care Provider: Edgar KENDALL   Referring Provider: Edgar KENDALL    Visit Date: August 5, 2020    Provider: ENOCH Rose   Location: [facility]   Location Address: [address] [place] [address], [address] [place] [address]  [id number]   Location Phone: [phone]          Chief Complaint  · 6 month follow up for DM2, HTN, and Hyperlipidemia  · Medication refills      History Of Present Illness  Taqueria Soto is a 70 year old /White male who presents for evaluation and treatment of:      Pt presents to the office today for 6 month f/u regarding DM, HLD, HTN.  He reports that he is feeling well but does complain of some intermittent mid-low back pain.  He states that it has been going on for about 1 month, is worse at the end of the day, and is improved with a heating pad.    He also c/o his feet being cold and tingling for the last 6 months or so.  He does take gabapentin TID but has been on the same dose for a year.   His blood glucose has been steady in the low 110 to 130's.    His BP upon arrival is 124/78 denies any chest pain shortness breath palpitations at this time.       Past Medical History  Disease Name Date Onset Notes   Arthritis --  --    Cataract --  --    Cervical radiculopathy 01/14/2019 --    Cervicalgia 01/14/2019 --    COPD (chronic obstructive pulmonary disease) --  --    Deafness --  --    Diabetes mellitus, insulin dependent (IDDM), controlled --  --    Diabetes mellitus, type 2 07/24/2018 --    Diabetes Mellitus, Type II --  --    Emphysema --  --    Essential hypertension 07/24/2018 --    Facet arthritis of cervical region 01/14/2019 --    Hematuria (Microscopic) --  --    Hemorrhoids --  --    High cholesterol --  --    Hyperlipidemia 07/24/2018 --    Hypertension --  --    Prostatitis, unspecified --  --    Reflux --  --    Shortness of Breath --  --  "   Sinus trouble --  --    Sleep Apnea --  --          Past Surgical History  Procedure Name Date Notes   Cataract exctraction with lens implant --  --          Medication List  Name Date Started Instructions   Alcohol Pads topical pads, medicated 02/05/2020 apply to affected area by topical route daily   Allegra Allergy 180 mg oral tablet 08/05/2020 take 1 tablet (180 mg) by oral route once daily for 90 days   aspirin 81 mg oral tablet,chewable 08/05/2020 chew 1 tablet (81 mg) by oral route once daily for 90 days   atorvastatin 40 mg oral tablet 08/05/2020 take 1 tablet (40 mg) by oral route once daily at bedtime for 90 days   Betimol 0.5 % ophthalmic (eye) drops  instill 1 drop into affected eye(s) by ophthalmic route 2 times per day   brimonidine 0.15 % ophthalmic (eye) drops  instill 1 drop into affected eye(s) by ophthalmic route 3 times per day   cyclobenzaprine 10 mg oral tablet 04/18/2019 take 1 tablet (10 mg) by oral route 2 times per day for 30 days   FreeStyle Lancets 28 gauge miscellaneous misc 12/16/2019 test sugars 4 times daily/dx: E11.9   Janumet XR 50-1,000 mg oral tablet, ER multiphase 24 hr 08/05/2020 take 2 tablets by oral route once daily with the evening meal, swallowing whole. Do not crush, chew and/or divide. for 90 days   Lantus Solostar U-100 Insulin 100 unit/mL (3 mL) subcutaneous insulin pen 08/05/2020 inject 36 units by subcutaneous route once   Lotrel 10-20 mg oral capsule 08/05/2020 take 1 capsule by oral route once daily for 90 days   omeprazole 40 mg oral capsule,delayed release(DR/EC) 08/05/2020 take 1 capsule (40 mg) by oral route once daily before a meal   pen needle, diabetic 32 gauge x 3/16\" miscellaneous needle 08/05/2019 Test 4 times daily/DX: E11.9/DM type 2   Precision Xtra Test miscellaneous strip 12/16/2019 Test 4 times daily/DX: E11.9   PreserVision AREDS-2 [id number] mg-unit-mg-mg oral capsule  take 1 capsule by oral route 2 times a day   ProAir HFA 90 mcg/actuation " inhalation HFA aerosol inhaler  inhale 1 puff (90 mcg) by inhalation route every 4 hours   Singulair 10 mg oral tablet 08/05/2020 take 1 tablet (10 mg) by oral route once daily in the evening   tamsulosin 0.4 mg oral capsule 08/05/2020 TAKE 1 CAPSULE BY MOUTH ONCE DAILY for 30 days         Allergy List  Allergen Name Date Reaction Notes   NO KNOWN DRUG ALLERGIES --  --  --          Family Medical History  Disease Name Relative/Age Notes   Family history of Arthritis Father/   Father   Family history of diabetes mellitus Sister/   Sister         Social History  Finding Status Start/Stop Quantity Notes   Alcohol Current some day --/-- --  occasionally drinks, less than 1 drink per day, has been drinking for 31 or more years   lives with spouse --  --/-- --  --     --  --/-- --  --    Retired --  --/-- --  --    Tobacco Former --/-- --  former smoker         Review of Systems  · Constitutional  o Denies  o : fatigue, night sweats  · Eyes  o Denies  o : double vision, blurred vision  · HENT  o Denies  o : vertigo, recent head injury  · Breasts  o Denies  o : abnormal changes in breast size, additional breast symptoms except as noted in the HPI  · Cardiovascular  o Denies  o : chest pain, irregular heart beats  · Respiratory  o Denies  o : shortness of breath, productive cough  · Gastrointestinal  o Denies  o : nausea, vomiting  · Genitourinary  o Denies  o : dysuria, urinary retention  · Integument  o Denies  o : hair growth change, new skin lesions  · Neurologic  o Denies  o : altered mental status, seizures  · Musculoskeletal  o Denies  o : joint swelling, limitation of motion  · Endocrine  o Denies  o : cold intolerance, heat intolerance  · Heme-Lymph  o Denies  o : petechiae, lymph node enlargement or tenderness  · Allergic-Immunologic  o Denies  o : frequent illnesses      Vitals  Date Time BP Position Site L\R Cuff Size HR RR TEMP (F) WT  HT  BMI kg/m2 BSA m2 O2 Sat HC       08/05/2020 10:02 /78  "Sitting    69 - R 18 97.3 250lbs 16oz 5'  11\" 35.01 2.39 96 %          Physical Examination  · Constitutional  o Appearance  o : well-nourished, in no acute distress  · Eyes  o Conjunctivae  o : conjunctivae normal  o Sclerae  o : sclerae white  o Pupils and Irises  o : pupils equal and round  o Eyelids/Ocular Adnexae  o : eyelid appearance normal, no exudates present  · Respiratory  o Respiratory Effort  o : breathing unlabored  o Inspection of Chest  o : normal appearance  o Auscultation of Lungs  o : normal breath sounds throughout inspiration and expiration  · Cardiovascular  o Heart  o :   § Auscultation of Heart  § : regular rate and rhythm, no murmurs, gallops or rubs  o Peripheral Vascular System  o :   § Extremities  § : no clubbing or edema  · Skin and Subcutaneous Tissue  o General Inspection  o : no rashes or lesions present, no areas of discoloration  o Body Hair  o : hair normal for age, general body hair distribution normal for age  o Digits and Nails  o : no clubbing, cyanosis, deformities or edema present, normal appearing nails  · Neurologic  o Mental Status Examination  o :   § Orientation  § : grossly oriented to person, place and time  o Gait and Station  o : normal gait, able to stand without difficulty  · Psychiatric  o Judgement and Insight  o : judgment and insight intact  o Mood and Affect  o : mood normal, affect appropriate  o Presence of Abnormal Thoughts  o : no hallucinations, no delusions present, no psychotic thoughts  · Left DM Foot Exam  o Sensation  o : normal sensory exam perceptible to 10-gram nylon monofilament exam (5/5), vibration and light touch.  o Visual Inspection  o : visual inspection is normal with no signs of breakdown, ulcerations or deformities unless otherwise noted.   o Vascular  o : palpable dorsalis pedis and posterir tibialis pulses present, normal capillary refill  · Right DM Foot Exam  o Sensation  o : normal sensory exam perceptible to 10-gram nylon " monofilament exam (5/5), vibration and light touch.  o Visual Inspection  o : visual inspection is normal with no signs of breakdown, ulcerations or deformities unless otherwise noted.   o Vascular  o : palpable dorsalis pedis and posterir tibialis pulses present, normal capillary refill              Assessment  · Screening for colon cancer     V76.51/Z12.11  · Diabetes mellitus, type 2     250.00/E11.9  · Essential hypertension     401.9/I10  · GERD (gastroesophageal reflux disease)     530.81/K21.9  · Hyperlipidemia     272.4/E78.5  · Lumbago     724.2/M54.5  · Diabetic peripheral neuropathy       Type 2 diabetes mellitus with diabetic polyneuropathy     250.60/E11.42      Plan  · Orders  o Cologuard (98490) - V76.51/Z12.11 - 08/05/2020  o Diabetes 2 Panel (Urine Microalbumin, CMP, Lipid, A1c, ) [facility] (85579, 17835, 15047, 92113) - 250.00/E11.9 - 02/05/2021  o Diabetic Foot (Motor and Sensory) Exam Completed [facility] (, , 2028F) - 250.00/E11.9 - 08/05/2020  o ACO-41: Dilated Diabetic eye exam completed this year and results in chart/reviewed (2022F) - 250.00/E11.9 - 08/05/2020  o ACO-39: Current medications updated and reviewed () - - 08/05/2020  o ACO-15: Pneumococcal Vaccine Administered or Previously Received (4040F) - - 08/05/2020  o ACO-14: Influenza immunization administered or previously received () - - 08/05/2020  · Medications  o Allegra Allergy 180 mg oral tablet   SIG: take 1 tablet (180 mg) by oral route once daily for 90 days   DISP: (90) tablet with 3 refills  Refilled on 08/05/2020     o aspirin 81 mg oral tablet,chewable   SIG: chew 1 tablet (81 mg) by oral route once daily for 90 days   DISP: (90) tablet with 3 refills  Refilled on 08/05/2020     o atorvastatin 40 mg oral tablet   SIG: take 1 tablet (40 mg) by oral route once daily at bedtime for 90 days   DISP: (90) tablets with 1 refills  Refilled on 08/05/2020     o Janumet XR 50-1,000 mg oral tablet, ER multiphase 24 hr   SIG:  take 2 tablets by oral route once daily with the evening meal, swallowing whole. Do not crush, chew and/or divide. for 90 days   DISP: (180) tablets with 1 refills  Refilled on 08/05/2020     o Lantus Solostar U-100 Insulin 100 unit/mL (3 mL) subcutaneous insulin pen   SIG: inject 36 units by subcutaneous route once   DISP: (12) 3 ml syringe with 3 refills  Refilled on 08/05/2020     o Lotrel 10-20 mg oral capsule   SIG: take 1 capsule by oral route once daily for 90 days   DISP: (90) capsule with 3 refills  Refilled on 08/05/2020     o omeprazole 40 mg oral capsule,delayed release(DR/EC)   SIG: take 1 capsule (40 mg) by oral route once daily before a meal   DISP: (90) capsule with 3 refills  Refilled on 08/05/2020     o Singulair 10 mg oral tablet   SIG: take 1 tablet (10 mg) by oral route once daily in the evening   DISP: (90) tablets with 1 refills  Refilled on 08/05/2020     o tamsulosin 0.4 mg oral capsule   SIG: TAKE 1 CAPSULE BY MOUTH ONCE DAILY for 30 days   DISP: (90) Capsule with 1 refills  Refilled on 08/05/2020     o amlodipine-benazepril 10-20 mg oral capsule   SIG: take 1 capsule by oral route once daily   DISP: (0) capsule with 0 refills  Discontinued on 08/05/2020     o diclofenac sodium 75 mg oral tablet,delayed release (DR/EC)   SIG: take 1 tablet (75 mg) by oral route 2 times per day   DISP: (180) tablets with 1 refills  Discontinued on 08/05/2020     o finasteride 5 mg oral tablet   SIG: take 1 tablet (5 mg) by oral route once daily for 90 days   DISP: (90) tablets with 3 refills  Discontinued on 08/05/2020     o hydroxyzine HCl 50 mg oral tablet   SIG: take 1 tablet by oral route 3 times a day as needed   DISP: (90) tablets with 0 refills  Discontinued on 08/05/2020     o Medications have been Reconciled  o Transition of Care or Provider Policy  · Instructions  o Continue blood sugar monitoring daily and record. Bring your log to office visits. Call the office for readings below 70 and above 250  or any complications.  o Daily foot care. Avoid walking barefoot. Annual Dilated Eye Exam.  o Discussed with patient blood pressure monitoring, hemoglobin A1C levels need to be below 7.0, and LDL (Lipid) goals below 70.  o Patient advised to monitor blood pressure (B/P) at home and journal readings. Patient informed that a B/P reading at home of more than 130/80 is considered hypertension. For readings greater [id number] or higher patient is advised to follow up in the office with readings for management. Patient advised to limit sodium intake.  o Advised that cheeses and other sources of dairy fats, animal fats, fast food, and the extras (candy, pastries, pies, doughnuts and cookies) all contain LDL raising nutrients. Advised to increase fruits, vegetables, whole grains, and to monitor portion sizes.   o Patient was educated/instructed on their diagnosis, treatment and medications prior to discharge from the clinic today.  o Time spent with the patient was minutes, more than 50% face to face.  o Electronically Identified Patient Education Materials Provided Electronically  · Disposition  o Call or Return if symptoms worsen or persist.  o follow up in 6 months            Electronically Signed by: ENOCH Rose -Author on August 5, 2020 11:06:14 AM

## 2021-05-14 VITALS — BODY MASS INDEX: 37.58 KG/M2 | WEIGHT: 262.5 LBS | HEIGHT: 70 IN

## 2021-05-14 VITALS
BODY MASS INDEX: 36.54 KG/M2 | SYSTOLIC BLOOD PRESSURE: 122 MMHG | TEMPERATURE: 98.5 F | WEIGHT: 261 LBS | RESPIRATION RATE: 18 BRPM | OXYGEN SATURATION: 97 % | DIASTOLIC BLOOD PRESSURE: 84 MMHG | HEART RATE: 73 BPM | HEIGHT: 71 IN

## 2021-05-14 VITALS
DIASTOLIC BLOOD PRESSURE: 73 MMHG | WEIGHT: 264 LBS | SYSTOLIC BLOOD PRESSURE: 141 MMHG | TEMPERATURE: 97.5 F | OXYGEN SATURATION: 96 % | HEART RATE: 77 BPM

## 2021-05-14 VITALS
WEIGHT: 263.31 LBS | SYSTOLIC BLOOD PRESSURE: 143 MMHG | OXYGEN SATURATION: 97 % | TEMPERATURE: 96.8 F | HEART RATE: 80 BPM | DIASTOLIC BLOOD PRESSURE: 71 MMHG

## 2021-05-15 VITALS
RESPIRATION RATE: 16 BRPM | HEIGHT: 71 IN | SYSTOLIC BLOOD PRESSURE: 118 MMHG | WEIGHT: 258.31 LBS | OXYGEN SATURATION: 95 % | HEART RATE: 66 BPM | BODY MASS INDEX: 36.16 KG/M2 | DIASTOLIC BLOOD PRESSURE: 72 MMHG | TEMPERATURE: 97.2 F

## 2021-05-15 VITALS
DIASTOLIC BLOOD PRESSURE: 74 MMHG | BODY MASS INDEX: 36.14 KG/M2 | HEIGHT: 71 IN | HEART RATE: 59 BPM | SYSTOLIC BLOOD PRESSURE: 130 MMHG | WEIGHT: 258.12 LBS

## 2021-05-15 VITALS
HEIGHT: 71 IN | WEIGHT: 260 LBS | HEART RATE: 67 BPM | BODY MASS INDEX: 36.4 KG/M2 | DIASTOLIC BLOOD PRESSURE: 62 MMHG | RESPIRATION RATE: 16 BRPM | OXYGEN SATURATION: 96 % | TEMPERATURE: 96.9 F | SYSTOLIC BLOOD PRESSURE: 114 MMHG

## 2021-05-15 VITALS
WEIGHT: 255.37 LBS | DIASTOLIC BLOOD PRESSURE: 58 MMHG | SYSTOLIC BLOOD PRESSURE: 128 MMHG | HEIGHT: 71 IN | BODY MASS INDEX: 35.75 KG/M2

## 2021-05-15 VITALS
HEIGHT: 71 IN | WEIGHT: 256.5 LBS | SYSTOLIC BLOOD PRESSURE: 133 MMHG | BODY MASS INDEX: 35.91 KG/M2 | DIASTOLIC BLOOD PRESSURE: 73 MMHG

## 2021-05-15 VITALS
SYSTOLIC BLOOD PRESSURE: 124 MMHG | DIASTOLIC BLOOD PRESSURE: 78 MMHG | OXYGEN SATURATION: 96 % | BODY MASS INDEX: 35.14 KG/M2 | TEMPERATURE: 97.3 F | WEIGHT: 251 LBS | HEIGHT: 71 IN | RESPIRATION RATE: 18 BRPM | HEART RATE: 69 BPM

## 2021-05-16 VITALS
WEIGHT: 256 LBS | OXYGEN SATURATION: 97 % | BODY MASS INDEX: 35.84 KG/M2 | DIASTOLIC BLOOD PRESSURE: 71 MMHG | TEMPERATURE: 96.9 F | HEART RATE: 69 BPM | HEIGHT: 71 IN | SYSTOLIC BLOOD PRESSURE: 156 MMHG | RESPIRATION RATE: 16 BRPM

## 2021-05-16 VITALS
SYSTOLIC BLOOD PRESSURE: 135 MMHG | BODY MASS INDEX: 35.18 KG/M2 | HEART RATE: 71 BPM | HEIGHT: 71 IN | TEMPERATURE: 98 F | WEIGHT: 251.31 LBS | DIASTOLIC BLOOD PRESSURE: 71 MMHG | RESPIRATION RATE: 16 BRPM | OXYGEN SATURATION: 96 %

## 2021-05-16 VITALS
SYSTOLIC BLOOD PRESSURE: 133 MMHG | DIASTOLIC BLOOD PRESSURE: 61 MMHG | BODY MASS INDEX: 35.42 KG/M2 | TEMPERATURE: 96.8 F | WEIGHT: 253 LBS | HEIGHT: 71 IN | HEART RATE: 67 BPM | RESPIRATION RATE: 16 BRPM | OXYGEN SATURATION: 98 %

## 2021-05-16 VITALS
BODY MASS INDEX: 35.56 KG/M2 | HEART RATE: 61 BPM | DIASTOLIC BLOOD PRESSURE: 67 MMHG | OXYGEN SATURATION: 96 % | SYSTOLIC BLOOD PRESSURE: 138 MMHG | WEIGHT: 254 LBS | HEIGHT: 71 IN | RESPIRATION RATE: 12 BRPM | TEMPERATURE: 98.1 F

## 2021-05-16 VITALS — BODY MASS INDEX: 34.72 KG/M2 | WEIGHT: 248 LBS | HEART RATE: 78 BPM | HEIGHT: 71 IN

## 2021-05-16 VITALS
BODY MASS INDEX: 35.98 KG/M2 | HEART RATE: 64 BPM | SYSTOLIC BLOOD PRESSURE: 130 MMHG | RESPIRATION RATE: 16 BRPM | HEIGHT: 71 IN | TEMPERATURE: 97.7 F | OXYGEN SATURATION: 96 % | WEIGHT: 257 LBS | DIASTOLIC BLOOD PRESSURE: 72 MMHG

## 2021-05-16 VITALS
DIASTOLIC BLOOD PRESSURE: 64 MMHG | WEIGHT: 257.12 LBS | HEART RATE: 61 BPM | OXYGEN SATURATION: 96 % | SYSTOLIC BLOOD PRESSURE: 111 MMHG | HEIGHT: 71 IN | BODY MASS INDEX: 36 KG/M2

## 2021-05-16 VITALS — HEART RATE: 70 BPM | HEIGHT: 71 IN | WEIGHT: 254 LBS | BODY MASS INDEX: 35.56 KG/M2

## 2021-06-08 DIAGNOSIS — K21.9 GASTROESOPHAGEAL REFLUX DISEASE, UNSPECIFIED WHETHER ESOPHAGITIS PRESENT: ICD-10-CM

## 2021-06-08 DIAGNOSIS — T78.40XD ALLERGY, SUBSEQUENT ENCOUNTER: Primary | ICD-10-CM

## 2021-06-08 DIAGNOSIS — E11.65 TYPE 2 DIABETES MELLITUS WITH HYPERGLYCEMIA, UNSPECIFIED WHETHER LONG TERM INSULIN USE (HCC): ICD-10-CM

## 2021-06-08 PROBLEM — E10.9 TYPE 1 DIABETES MELLITUS: Status: ACTIVE | Noted: 2021-06-08

## 2021-06-08 PROBLEM — I10 ESSENTIAL HYPERTENSION: Status: ACTIVE | Noted: 2018-07-24

## 2021-06-08 PROBLEM — E78.5 HYPERLIPIDEMIA: Status: ACTIVE | Noted: 2018-07-24

## 2021-06-08 RX ORDER — SITAGLIPTIN AND METFORMIN HYDROCHLORIDE 1000; 50 MG/1; MG/1
2 TABLET, FILM COATED, EXTENDED RELEASE ORAL DAILY
Qty: 180 TABLET | Refills: 1 | Status: SHIPPED | OUTPATIENT
Start: 2021-06-08 | End: 2021-12-10 | Stop reason: SDUPTHER

## 2021-06-08 RX ORDER — CYCLOBENZAPRINE HCL 10 MG
1 TABLET ORAL DAILY
COMMUNITY
Start: 2021-05-11 | End: 2022-02-09 | Stop reason: SDUPTHER

## 2021-06-08 RX ORDER — OMEPRAZOLE 40 MG/1
40 CAPSULE, DELAYED RELEASE ORAL DAILY
Qty: 90 CAPSULE | Refills: 0 | Status: SHIPPED | OUTPATIENT
Start: 2021-06-08 | End: 2021-06-08 | Stop reason: SDUPTHER

## 2021-06-08 RX ORDER — ASPIRIN 81 MG/1
81 TABLET ORAL DAILY
Qty: 90 TABLET | Refills: 3 | Status: SHIPPED | OUTPATIENT
Start: 2021-06-08 | End: 2021-12-10 | Stop reason: SDUPTHER

## 2021-06-08 RX ORDER — FEXOFENADINE HCL 180 MG/1
180 TABLET ORAL DAILY
Qty: 90 TABLET | Refills: 1 | Status: SHIPPED | OUTPATIENT
Start: 2021-06-08 | End: 2021-12-10 | Stop reason: SDUPTHER

## 2021-06-08 RX ORDER — MELOXICAM 15 MG/1
15 TABLET ORAL DAILY
Qty: 90 TABLET | Refills: 1 | Status: SHIPPED | OUTPATIENT
Start: 2021-06-08 | End: 2021-12-10 | Stop reason: SDUPTHER

## 2021-06-08 RX ORDER — FEXOFENADINE HCL 180 MG/1
1 TABLET ORAL DAILY
COMMUNITY
End: 2021-06-08 | Stop reason: SDUPTHER

## 2021-06-08 RX ORDER — MELOXICAM 15 MG/1
15 TABLET ORAL DAILY
Qty: 90 TABLET | Refills: 0 | Status: SHIPPED | OUTPATIENT
Start: 2021-06-08 | End: 2021-06-08 | Stop reason: SDUPTHER

## 2021-06-08 RX ORDER — OMEPRAZOLE 40 MG/1
40 CAPSULE, DELAYED RELEASE ORAL DAILY
COMMUNITY
End: 2021-06-08 | Stop reason: SDUPTHER

## 2021-06-08 RX ORDER — FINASTERIDE 5 MG/1
5 TABLET, FILM COATED ORAL DAILY
Qty: 90 TABLET | Refills: 1 | Status: SHIPPED | OUTPATIENT
Start: 2021-06-08 | End: 2022-02-09 | Stop reason: SDUPTHER

## 2021-06-08 RX ORDER — SITAGLIPTIN AND METFORMIN HYDROCHLORIDE 1000; 50 MG/1; MG/1
2 TABLET, FILM COATED, EXTENDED RELEASE ORAL DAILY
Qty: 180 TABLET | Refills: 0 | Status: SHIPPED | OUTPATIENT
Start: 2021-06-08 | End: 2021-06-08 | Stop reason: SDUPTHER

## 2021-06-08 RX ORDER — ATORVASTATIN CALCIUM 40 MG/1
1 TABLET, FILM COATED ORAL DAILY
COMMUNITY
Start: 2020-12-08 | End: 2021-06-08 | Stop reason: SDUPTHER

## 2021-06-08 RX ORDER — ATORVASTATIN CALCIUM 40 MG/1
40 TABLET, FILM COATED ORAL DAILY
Qty: 90 TABLET | Refills: 1 | Status: SHIPPED | OUTPATIENT
Start: 2021-06-08 | End: 2021-09-28 | Stop reason: SDUPTHER

## 2021-06-08 RX ORDER — ASPIRIN 81 MG/1
1 TABLET ORAL DAILY
COMMUNITY
End: 2021-06-08 | Stop reason: SDUPTHER

## 2021-06-08 RX ORDER — OMEPRAZOLE 40 MG/1
40 CAPSULE, DELAYED RELEASE ORAL DAILY
Qty: 90 CAPSULE | Refills: 1 | Status: SHIPPED | OUTPATIENT
Start: 2021-06-08 | End: 2021-12-10 | Stop reason: SDUPTHER

## 2021-06-08 RX ORDER — GABAPENTIN 300 MG/1
1 CAPSULE ORAL 3 TIMES DAILY
COMMUNITY
End: 2021-09-28 | Stop reason: SDUPTHER

## 2021-06-08 RX ORDER — MELOXICAM 15 MG/1
1 TABLET ORAL DAILY
COMMUNITY
Start: 2020-12-08 | End: 2021-06-08 | Stop reason: SDUPTHER

## 2021-06-08 RX ORDER — AMLODIPINE BESYLATE AND BENAZEPRIL HYDROCHLORIDE 10; 20 MG/1; MG/1
1 CAPSULE ORAL DAILY
Qty: 90 CAPSULE | Refills: 1 | Status: SHIPPED | OUTPATIENT
Start: 2021-06-08 | End: 2021-12-10 | Stop reason: SDUPTHER

## 2021-06-08 RX ORDER — SITAGLIPTIN AND METFORMIN HYDROCHLORIDE 1000; 50 MG/1; MG/1
2 TABLET, FILM COATED, EXTENDED RELEASE ORAL DAILY
COMMUNITY
Start: 2020-12-08 | End: 2021-06-08 | Stop reason: SDUPTHER

## 2021-06-08 RX ORDER — AMLODIPINE BESYLATE AND BENAZEPRIL HYDROCHLORIDE 10; 20 MG/1; MG/1
1 CAPSULE ORAL DAILY
COMMUNITY
End: 2021-06-08 | Stop reason: SDUPTHER

## 2021-06-08 RX ORDER — TAMSULOSIN HYDROCHLORIDE 0.4 MG/1
1 CAPSULE ORAL DAILY
COMMUNITY
End: 2022-06-10 | Stop reason: SDUPTHER

## 2021-06-08 RX ORDER — FINASTERIDE 5 MG/1
1 TABLET, FILM COATED ORAL DAILY
COMMUNITY
Start: 2021-04-20 | End: 2021-06-08 | Stop reason: SDUPTHER

## 2021-06-08 RX ORDER — FEXOFENADINE HCL 180 MG/1
180 TABLET ORAL DAILY
Qty: 90 TABLET | Refills: 0 | Status: SHIPPED | OUTPATIENT
Start: 2021-06-08 | End: 2021-06-08 | Stop reason: SDUPTHER

## 2021-07-20 ENCOUNTER — OFFICE VISIT (OUTPATIENT)
Dept: ORTHOPEDIC SURGERY | Facility: CLINIC | Age: 71
End: 2021-07-20

## 2021-07-20 ENCOUNTER — LAB (OUTPATIENT)
Dept: LAB | Facility: HOSPITAL | Age: 71
End: 2021-07-20

## 2021-07-20 ENCOUNTER — TRANSCRIBE ORDERS (OUTPATIENT)
Dept: LAB | Facility: HOSPITAL | Age: 71
End: 2021-07-20

## 2021-07-20 VITALS — WEIGHT: 261 LBS | OXYGEN SATURATION: 96 % | BODY MASS INDEX: 36.54 KG/M2 | HEART RATE: 76 BPM | HEIGHT: 71 IN

## 2021-07-20 DIAGNOSIS — I10 HYPERTENSION, UNSPECIFIED TYPE: ICD-10-CM

## 2021-07-20 DIAGNOSIS — M22.2X1 PATELLOFEMORAL SYNDROME OF BOTH KNEES: Primary | ICD-10-CM

## 2021-07-20 DIAGNOSIS — M22.2X2 PATELLOFEMORAL SYNDROME OF BOTH KNEES: Primary | ICD-10-CM

## 2021-07-20 DIAGNOSIS — M25.562 LEFT KNEE PAIN, UNSPECIFIED CHRONICITY: ICD-10-CM

## 2021-07-20 DIAGNOSIS — M25.561 RIGHT KNEE PAIN, UNSPECIFIED CHRONICITY: ICD-10-CM

## 2021-07-20 DIAGNOSIS — E11.9 DIABETES MELLITUS WITHOUT COMPLICATION (HCC): ICD-10-CM

## 2021-07-20 DIAGNOSIS — E11.9 DIABETES MELLITUS WITHOUT COMPLICATION (HCC): Primary | ICD-10-CM

## 2021-07-20 DIAGNOSIS — I10 ESSENTIAL HYPERTENSION, MALIGNANT: ICD-10-CM

## 2021-07-20 LAB
ALBUMIN SERPL-MCNC: 4.3 G/DL (ref 3.5–5.2)
ALBUMIN UR-MCNC: <1.2 MG/DL
ALBUMIN/GLOB SERPL: 1.5 G/DL
ALP SERPL-CCNC: 81 U/L (ref 39–117)
ALT SERPL W P-5'-P-CCNC: 18 U/L (ref 1–41)
ANION GAP SERPL CALCULATED.3IONS-SCNC: 8.6 MMOL/L (ref 5–15)
AST SERPL-CCNC: 24 U/L (ref 1–40)
BASOPHILS # BLD AUTO: 0.04 10*3/MM3 (ref 0–0.2)
BASOPHILS NFR BLD AUTO: 0.5 % (ref 0–1.5)
BILIRUB SERPL-MCNC: 0.5 MG/DL (ref 0–1.2)
BUN SERPL-MCNC: 14 MG/DL (ref 8–23)
BUN/CREAT SERPL: 16.7 (ref 7–25)
CALCIUM SPEC-SCNC: 9.1 MG/DL (ref 8.6–10.5)
CHLORIDE SERPL-SCNC: 104 MMOL/L (ref 98–107)
CHOLEST SERPL-MCNC: 120 MG/DL (ref 0–200)
CO2 SERPL-SCNC: 23.4 MMOL/L (ref 22–29)
CREAT SERPL-MCNC: 0.84 MG/DL (ref 0.76–1.27)
DEPRECATED RDW RBC AUTO: 42.9 FL (ref 37–54)
EOSINOPHIL # BLD AUTO: 0.33 10*3/MM3 (ref 0–0.4)
EOSINOPHIL NFR BLD AUTO: 4.3 % (ref 0.3–6.2)
ERYTHROCYTE [DISTWIDTH] IN BLOOD BY AUTOMATED COUNT: 13.8 % (ref 12.3–15.4)
GFR SERPL CREATININE-BSD FRML MDRD: 90 ML/MIN/1.73
GLOBULIN UR ELPH-MCNC: 2.9 GM/DL
GLUCOSE SERPL-MCNC: 77 MG/DL (ref 65–99)
HBA1C MFR BLD: 6 % (ref 4.8–5.6)
HCT VFR BLD AUTO: 40.6 % (ref 37.5–51)
HDLC SERPL-MCNC: 40 MG/DL (ref 40–60)
HGB BLD-MCNC: 13.4 G/DL (ref 13–17.7)
IMM GRANULOCYTES # BLD AUTO: 0.02 10*3/MM3 (ref 0–0.05)
IMM GRANULOCYTES NFR BLD AUTO: 0.3 % (ref 0–0.5)
LDLC SERPL CALC-MCNC: 63 MG/DL (ref 0–100)
LDLC/HDLC SERPL: 1.57 {RATIO}
LYMPHOCYTES # BLD AUTO: 1.78 10*3/MM3 (ref 0.7–3.1)
LYMPHOCYTES NFR BLD AUTO: 23.1 % (ref 19.6–45.3)
MCH RBC QN AUTO: 28.1 PG (ref 26.6–33)
MCHC RBC AUTO-ENTMCNC: 33 G/DL (ref 31.5–35.7)
MCV RBC AUTO: 85.1 FL (ref 79–97)
MONOCYTES # BLD AUTO: 0.97 10*3/MM3 (ref 0.1–0.9)
MONOCYTES NFR BLD AUTO: 12.6 % (ref 5–12)
NEUTROPHILS NFR BLD AUTO: 4.56 10*3/MM3 (ref 1.7–7)
NEUTROPHILS NFR BLD AUTO: 59.2 % (ref 42.7–76)
NRBC BLD AUTO-RTO: 0 /100 WBC (ref 0–0.2)
PLATELET # BLD AUTO: 215 10*3/MM3 (ref 140–450)
PMV BLD AUTO: 12.3 FL (ref 6–12)
POTASSIUM SERPL-SCNC: 4.1 MMOL/L (ref 3.5–5.2)
PROT SERPL-MCNC: 7.2 G/DL (ref 6–8.5)
RBC # BLD AUTO: 4.77 10*6/MM3 (ref 4.14–5.8)
SODIUM SERPL-SCNC: 136 MMOL/L (ref 136–145)
TRIGL SERPL-MCNC: 87 MG/DL (ref 0–150)
VLDLC SERPL-MCNC: 17 MG/DL (ref 5–40)
WBC # BLD AUTO: 7.7 10*3/MM3 (ref 3.4–10.8)

## 2021-07-20 PROCEDURE — 82043 UR ALBUMIN QUANTITATIVE: CPT

## 2021-07-20 PROCEDURE — 80053 COMPREHEN METABOLIC PANEL: CPT

## 2021-07-20 PROCEDURE — 80061 LIPID PANEL: CPT

## 2021-07-20 PROCEDURE — 85025 COMPLETE CBC W/AUTO DIFF WBC: CPT

## 2021-07-20 PROCEDURE — 36415 COLL VENOUS BLD VENIPUNCTURE: CPT

## 2021-07-20 PROCEDURE — 99203 OFFICE O/P NEW LOW 30 MIN: CPT | Performed by: ORTHOPAEDIC SURGERY

## 2021-07-20 PROCEDURE — 83036 HEMOGLOBIN GLYCOSYLATED A1C: CPT

## 2021-07-20 RX ORDER — TIMOLOL MALEATE 5 MG/ML
1 SOLUTION OPHTHALMIC 2 TIMES DAILY
COMMUNITY

## 2021-07-20 RX ORDER — DICLOFENAC SODIUM 75 MG/1
TABLET, DELAYED RELEASE ORAL
COMMUNITY
End: 2021-08-06 | Stop reason: ALTCHOICE

## 2021-07-20 RX ORDER — BRIMONIDINE TARTRATE 0.15 %
DROPS OPHTHALMIC (EYE)
COMMUNITY
End: 2022-02-09

## 2021-07-20 NOTE — PROGRESS NOTES
"Chief Complaint  Initial Evaluation and Pain of the Right Knee and Initial Evaluation and Pain of the Left Knee     Subjective      Taqueria Soto presents to [facility] for an evaluation of bilateral knees. Patient hasn't had any formal treatment for his bilateral knees. He states on his bad days he is unable to get up and ambulate. On good days he is able to do activities such as cutting his grass. He states left knee is worse than the right. He states his knees pop and crack, which results in pain.     Allergies   Allergen Reactions   • Nitroglycerin Other (See Comments)     Heart rate dropped down        Social History     Socioeconomic History   • Marital status:      Spouse name: Not on file   • Number of children: Not on file   • Years of education: Not on file   • Highest education level: Not on file   Tobacco Use   • Smoking status: Former Smoker   • Smokeless tobacco: Never Used        Review of Systems     Objective   Vital Signs:   Pulse 76   Ht 179.1 cm (70.5\")   Wt 118 kg (261 lb)   SpO2 96%   BMI 36.92 kg/m²       Physical Exam  Constitutional:       Appearance: Normal appearance. He is well-developed and normal weight.   HENT:      Head: Normocephalic.      Right Ear: Hearing and external ear normal.      Left Ear: Hearing and external ear normal.      Nose: Nose normal.   Eyes:      Conjunctiva/sclera: Conjunctivae normal.   Cardiovascular:      Rate and Rhythm: Normal rate.   Pulmonary:      Effort: Pulmonary effort is normal.      Breath sounds: No wheezing or rales.   Abdominal:      Palpations: Abdomen is soft.      Tenderness: There is no abdominal tenderness.   Musculoskeletal:      Cervical back: Normal range of motion.   Skin:     Findings: No rash.   Neurological:      Mental Status: He is alert and oriented to person, place, and time.   Psychiatric:         Mood and Affect: Mood and affect normal.         Judgment: Judgment normal.       Ortho Exam  "     LEFT KNEE: Full extension. Flexion to 120 degrees. Calf supple, non-tender. Sensation grossly intact. Neurovascular intact. Good strength to hamstrings, quadriceps, dorsiflexors and plantar flexors. No swelling, skin discoloration or atrophy. Dorsal Pedal Pulse 2+, posteriror tibialis pulse 2+.        RIGHT KNEE: Full extension. Flexion to 125 degrees. Limping gait. Full weight bearing. Calf supple, non-tender. Sensation grossly intact. Neurovascular intact. Good strength to hamstrings, quadriceps, dorsiflexors and plantar flexors. No swelling, skin discoloration or atrophy. Dorsal Pedal Pulse 2+, posteriror tibialis pulse 2+.        Procedures      Imaging Results (Most Recent)     Procedure Component Value Units Date/Time    XR Knee 3 View Bilateral [[id number]] Resulted: 07/20/21 1504     Updated: 07/20/21 1505    Narrative:      X-Ray Report:  Bilateral knee(s) X-Ray  Indication: Evaluation of bilateral knees   AP, Lateral and Standing view(s)  Findings: Demonstrates moderate degenerative changes of bilateral knees.   Lateral tracking kneecaps.   Prior studies available for comparison: no            Result Review :       X-Ray Report:  Bilateral knee(s) X-Ray  Indication: Evaluation of bilateral knees   AP, Lateral and Standing view(s)  Findings: Demonstrates moderate degenerative changes of bilateral knees. Lateral tracking kneecaps.   Prior studies available for comparison: no            Assessment and Plan     DX: Bilateral knee patellofemoral syndrome     Discussed treatment plans and diagnosis with the patient. Patient opted for physical therapy and was given a PT prescription in office today. If symptoms fail to improve, we will discuss injections in the future.     Call or return if worsening symptoms.    Follow Up     PRN.       Patient was given instructions and counseling regarding his condition or for health maintenance advice. Please see specific information pulled into the AVS if appropriate.      Scribed for Emory Henriquez MD by Rosibel Abarca.  07/20/21   10:28 EDT    I have personally performed the services described in this document as scribed by the above individual and it is both accurate and complete.  Emory Henriquez MD 07/20/21  10:28 EDT

## 2021-08-06 ENCOUNTER — OFFICE VISIT (OUTPATIENT)
Dept: FAMILY MEDICINE CLINIC | Facility: CLINIC | Age: 71
End: 2021-08-06

## 2021-08-06 ENCOUNTER — LAB (OUTPATIENT)
Dept: LAB | Facility: HOSPITAL | Age: 71
End: 2021-08-06

## 2021-08-06 VITALS
SYSTOLIC BLOOD PRESSURE: 130 MMHG | WEIGHT: 256 LBS | TEMPERATURE: 98.2 F | HEART RATE: 65 BPM | HEIGHT: 71 IN | DIASTOLIC BLOOD PRESSURE: 82 MMHG | BODY MASS INDEX: 35.84 KG/M2 | OXYGEN SATURATION: 98 %

## 2021-08-06 DIAGNOSIS — Z00.00 MEDICARE ANNUAL WELLNESS VISIT, SUBSEQUENT: ICD-10-CM

## 2021-08-06 DIAGNOSIS — K21.9 GASTROESOPHAGEAL REFLUX DISEASE WITHOUT ESOPHAGITIS: ICD-10-CM

## 2021-08-06 DIAGNOSIS — Z79.4 TYPE 2 DIABETES MELLITUS WITHOUT COMPLICATION, WITH LONG-TERM CURRENT USE OF INSULIN (HCC): ICD-10-CM

## 2021-08-06 DIAGNOSIS — I10 ESSENTIAL HYPERTENSION: ICD-10-CM

## 2021-08-06 DIAGNOSIS — N40.0 BENIGN PROSTATIC HYPERPLASIA WITHOUT LOWER URINARY TRACT SYMPTOMS: ICD-10-CM

## 2021-08-06 DIAGNOSIS — M79.674 PAIN OF TOE OF RIGHT FOOT: ICD-10-CM

## 2021-08-06 DIAGNOSIS — Z12.5 SCREENING FOR PROSTATE CANCER: Primary | ICD-10-CM

## 2021-08-06 DIAGNOSIS — Z23 NEED FOR TETANUS BOOSTER: ICD-10-CM

## 2021-08-06 DIAGNOSIS — E11.9 TYPE 2 DIABETES MELLITUS WITHOUT COMPLICATION, WITH LONG-TERM CURRENT USE OF INSULIN (HCC): ICD-10-CM

## 2021-08-06 DIAGNOSIS — H61.22 IMPACTED CERUMEN OF LEFT EAR: ICD-10-CM

## 2021-08-06 DIAGNOSIS — K21.9 GASTROESOPHAGEAL REFLUX DISEASE, UNSPECIFIED WHETHER ESOPHAGITIS PRESENT: ICD-10-CM

## 2021-08-06 DIAGNOSIS — E78.5 HYPERLIPIDEMIA, UNSPECIFIED HYPERLIPIDEMIA TYPE: ICD-10-CM

## 2021-08-06 DIAGNOSIS — Z53.21 PATIENT LEFT WITHOUT BEING SEEN: Primary | ICD-10-CM

## 2021-08-06 DIAGNOSIS — Z12.5 SCREENING FOR PROSTATE CANCER: ICD-10-CM

## 2021-08-06 PROBLEM — M50.30 DDD (DEGENERATIVE DISC DISEASE), CERVICAL: Status: ACTIVE | Noted: 2018-10-16

## 2021-08-06 PROBLEM — K64.9 HEMORRHOIDS: Status: ACTIVE | Noted: 2021-08-06

## 2021-08-06 PROBLEM — I11.9 HYPERTENSIVE HEART DISEASE WITHOUT CONGESTIVE HEART FAILURE: Status: ACTIVE | Noted: 2021-08-06

## 2021-08-06 PROBLEM — J44.9 CHRONIC OBSTRUCTIVE PULMONARY DISEASE: Status: ACTIVE | Noted: 2021-08-06

## 2021-08-06 PROBLEM — M47.812 CERVICAL SPONDYLOSIS WITHOUT MYELOPATHY: Status: ACTIVE | Noted: 2018-10-16

## 2021-08-06 PROBLEM — M54.12 CERVICAL RADICULOPATHY: Status: ACTIVE | Noted: 2019-01-14

## 2021-08-06 LAB
PSA SERPL-MCNC: 0.08 NG/ML (ref 0–4)
URATE SERPL-MCNC: 5.5 MG/DL (ref 3.4–7)

## 2021-08-06 PROCEDURE — G0103 PSA SCREENING: HCPCS

## 2021-08-06 PROCEDURE — 99213 OFFICE O/P EST LOW 20 MIN: CPT | Performed by: NURSE PRACTITIONER

## 2021-08-06 PROCEDURE — 69209 REMOVE IMPACTED EAR WAX UNI: CPT | Performed by: NURSE PRACTITIONER

## 2021-08-06 PROCEDURE — G0439 PPPS, SUBSEQ VISIT: HCPCS | Performed by: NURSE PRACTITIONER

## 2021-08-06 PROCEDURE — 84550 ASSAY OF BLOOD/URIC ACID: CPT

## 2021-08-06 PROCEDURE — 36415 COLL VENOUS BLD VENIPUNCTURE: CPT

## 2021-08-06 RX ORDER — MONTELUKAST SODIUM 10 MG/1
10 TABLET ORAL NIGHTLY
COMMUNITY
End: 2022-02-09

## 2021-08-06 RX ORDER — ALBUTEROL SULFATE 90 UG/1
2 AEROSOL, METERED RESPIRATORY (INHALATION) EVERY 4 HOURS PRN
COMMUNITY
End: 2022-02-09

## 2021-08-06 NOTE — PROGRESS NOTES
The ABCs of the Annual Wellness Visit  Subsequent Medicare Wellness Visit    Chief Complaint   Patient presents with   • Medicare Wellness-subsequent   • Ear Fullness       Subjective   History of Present Illness:  Taqueria Soto is a 71 y.o. male who presents for a Subsequent Medicare Wellness Visit.    Presents to the office today for 6-month follow-up regarding his diabetes.  He did bring a log of his glucose levels.  Also reviewed recent lab work with the patient including his A1c of 6.0%.  Patient does complain of muffled hearing and feels that he may have cerumen impaction again.  He also complains of right great toe pain.  She denies any history of gout.    HEALTH RISK ASSESSMENT    Recent Hospitalizations:  No hospitalization(s) within the last year.    Current Medical Providers:  Patient Care Team:  Edgar Briscoe APRN as PCP - General (Nurse Practitioner)    Smoking Status:  Social History     Tobacco Use   Smoking Status Former Smoker   Smokeless Tobacco Never Used       Alcohol Consumption:  Social History     Substance and Sexual Activity   Alcohol Use Never    Comment: Current some day       Depression Screen:   PHQ-2/PHQ-9 Depression Screening 8/6/2021   Little interest or pleasure in doing things 0   Feeling down, depressed, or hopeless 0   Total Score 0       Fall Risk Screen:  STEADI Fall Risk Assessment was completed, and patient is at LOW risk for falls.Assessment completed on:8/6/2021    Health Habits and Functional and Cognitive Screening:  Functional & Cognitive Status 8/6/2021   Do you have difficulty preparing food and eating? No   Do you have difficulty bathing yourself, getting dressed or grooming yourself? No   Do you have difficulty using the toilet? No   Do you have difficulty moving around from place to place? No   Do you have trouble with steps or getting out of a bed or a chair? No   Current Diet Unhealthy Diet   Dental Exam Up to date   Eye Exam Up to date   Exercise (times per week)  0 times per week   Current Exercises Include No Regular Exercise   Do you need help using the phone?  No   Are you deaf or do you have serious difficulty hearing?  Yes   Do you need help with transportation? No   Do you need help shopping? No   Do you need help preparing meals?  No   Do you need help with housework?  No   Do you need help with laundry? No   Do you need help taking your medications? No   Do you need help managing money? No   Do you ever drive or ride in a car without wearing a seat belt? No   Have you felt unusual stress, anger or loneliness in the last month? No   Who do you live with? Spouse   If you need help, do you have trouble finding someone available to you? No   Have you been bothered in the last four weeks by sexual problems? No   Do you have difficulty concentrating, remembering or making decisions? No         Does the patient have evidence of cognitive impairment? No    Asprin use counseling:Taking ASA appropriately as indicated    Age-appropriate Screening Schedule:  Refer to the list below for future screening recommendations based on patient's age, sex and/or medical conditions. Orders for these recommended tests are listed in the plan section. The patient has been provided with a written plan.    Health Maintenance   Topic Date Due   • TDAP/TD VACCINES (1 - Tdap) Never done   • ZOSTER VACCINE (1 of 2) Never done   • DIABETIC FOOT EXAM  06/08/2021   • DIABETIC EYE EXAM  08/05/2021   • INFLUENZA VACCINE  10/01/2021   • HEMOGLOBIN A1C  01/20/2022   • LIPID PANEL  07/20/2022   • URINE MICROALBUMIN  07/20/2022          The following portions of the patient's history were reviewed and updated as appropriate: allergies, past family history and past social history.    Outpatient Medications Prior to Visit   Medication Sig Dispense Refill   • albuterol sulfate  (90 Base) MCG/ACT inhaler Inhale 2 puffs Every 4 (Four) Hours As Needed for Wheezing.     • amLODIPine-benazepril (LOTREL)  10-20 MG per capsule Take 1 capsule by mouth Daily. 90 capsule 1   • aspirin 81 MG EC tablet Take 1 tablet by mouth Daily. 90 tablet 3   • atorvastatin (LIPITOR) 40 MG tablet Take 1 tablet by mouth Daily. 90 tablet 1   • brimonidine (ALPHAGAN) 0.15 % ophthalmic solution brimonidine 0.15 % eye drops     • cyclobenzaprine (FLEXERIL) 10 MG tablet Take 1 tablet by mouth Daily.     • fexofenadine (ALLEGRA) 180 MG tablet Take 1 tablet by mouth Daily. 90 tablet 1   • finasteride (PROSCAR) 5 MG tablet Take 1 tablet by mouth Daily. 90 tablet 1   • gabapentin (NEURONTIN) 300 MG capsule Take 1 capsule by mouth 3 (Three) Times a Day.     • meloxicam (MOBIC) 15 MG tablet Take 1 tablet by mouth Daily. 90 tablet 1   • montelukast (SINGULAIR) 10 MG tablet Take 10 mg by mouth Every Night.     • omeprazole (priLOSEC) 40 MG capsule Take 1 capsule by mouth Daily. 90 capsule 1   • SITagliptin-metFORMIN HCl ER (Janumet XR)  MG tablet Take 2 tablets by mouth Daily. 180 tablet 1   • tamsulosin (FLOMAX) 0.4 MG capsule 24 hr capsule Take 1 capsule by mouth Daily.     • timolol (TIMOPTIC-XR) 0.5 % ophthalmic gel-forming 1 drop Daily.     • diclofenac (VOLTAREN) 75 MG EC tablet diclofenac sodium 75 mg tablet,delayed release     • Insulin Glargine (LANTUS SOLOSTAR) 100 UNIT/ML injection pen Inject 36 Units under the skin into the appropriate area as directed Daily. (Patient taking differently: Inject 42 Units under the skin into the appropriate area as directed Daily.) 9 pen 1     No facility-administered medications prior to visit.       Patient Active Problem List   Diagnosis   • Type 1 diabetes mellitus (CMS/HCC)   • Hyperlipidemia   • Essential hypertension   • Benign prostatic hyperplasia   • Cervical radiculopathy   • Diabetes mellitus without complication (CMS/HCC)   • High blood pressure   • Chronic obstructive pulmonary disease (CMS/HCC)   • Hypertensive heart disease without congestive heart failure   • Hemorrhoids   •  "Gastroesophageal reflux disease   • DDD (degenerative disc disease), cervical   • Cervical spondylosis without myelopathy       Advanced Care Planning:  ACP discussion was held with the patient during this visit. Patient has an advance directive in EMR which is still valid.     Review of Systems    Compared to one year ago, the patient feels his physical health is the same.  Compared to one year ago, the patient feels his mental health is the same.    Reviewed chart for potential of high risk medication in the elderly: yes  Reviewed chart for potential of harmful drug interactions in the elderly:yes    Objective         Vitals:    08/06/21 0910   BP: 130/82   Pulse: 65   Temp: 98.2 °F (36.8 °C)   SpO2: 98%   Weight: 116 kg (256 lb)   Height: 179.1 cm (70.5\")       Body mass index is 36.21 kg/m².  Discussed the patient's BMI with him. The BMI is above average; no BMI management plan is appropriate..    Physical Exam    Lab Results   Component Value Date    TRIG 87 07/20/2021    HDL 40 07/20/2021    LDL 63 07/20/2021    VLDL 17 07/20/2021    HGBA1C 6.00 (H) 07/20/2021        Assessment/Plan   Medicare Risks and Personalized Health Plan  CMS Preventative Services Quick Reference  Cardiovascular risk  Chronic Pain   Diabetic Lab Screening   Fall Risk  Hearing Problem  Prostate Cancer Screening     The above risks/problems have been discussed with the patient.  Pertinent information has been shared with the patient in the After Visit Summary.  Follow up plans and orders are seen below in the Assessment/Plan Section.    Diagnoses and all orders for this visit:    1. Screening for prostate cancer (Primary)  -     PSA SCREENING; Future    2. Pain of toe of right foot  -     Uric acid; Future    3. Medicare annual wellness visit, subsequent    4. Type 2 diabetes mellitus without complication, with long-term current use of insulin (CMS/[facility])  -     CBC (No Diff); Future  -     Comprehensive Metabolic Panel; Future  -     " Hemoglobin A1c; Future  -     Insulin Glargine (LANTUS SOLOSTAR) 100 UNIT/ML injection pen; Inject 42 Units under the skin into the appropriate area as directed Daily.  Dispense: 12 pen; Refill: 1    5. Gastroesophageal reflux disease without esophagitis  -     CBC (No Diff); Future  -     Comprehensive Metabolic Panel; Future    6. Hyperlipidemia, unspecified hyperlipidemia type  -     CBC (No Diff); Future  -     Comprehensive Metabolic Panel; Future  -     Lipid Panel; Future    7. Essential hypertension  -     CBC (No Diff); Future  -     Comprehensive Metabolic Panel; Future    8. Benign prostatic hyperplasia without lower urinary tract symptoms    9. Impacted cerumen of left ear    10. Gastroesophageal reflux disease, unspecified whether esophagitis present  -     Insulin Glargine (LANTUS SOLOSTAR) 100 UNIT/ML injection pen; Inject 42 Units under the skin into the appropriate area as directed Daily.  Dispense: 12 pen; Refill: 1      Ears were irrigated utilizing warm water.  Cerumen removed from left ear. patient tolerated it well.  After irrigation TM appears without erythema edema or perforation    Follow Up:  Return in about 6 months (around 2/6/2022) for Recheck.     An After Visit Summary and PPPS were given to the patient.

## 2021-08-11 ENCOUNTER — TELEPHONE (OUTPATIENT)
Dept: FAMILY MEDICINE CLINIC | Facility: CLINIC | Age: 71
End: 2021-08-11

## 2021-08-11 DIAGNOSIS — M79.674 PAIN OF TOE OF RIGHT FOOT: Primary | ICD-10-CM

## 2021-08-11 NOTE — TELEPHONE ENCOUNTER
----- Message from ENOCH Rose sent at 8/11/2021 12:58 PM EDT -----  Labs were unremarkable.  If  Patient continues to have great toe pain then we need to obtain an x-ray of his foot.

## 2021-08-16 NOTE — PROGRESS NOTES
Progress Note      Patient Name: Taqueria Soto   Patient ID: [id number]   Sex: Male   YOB: 1950    Primary Care Provider: Edgar KENDALL   Referring Provider: Edgar KENDALL    Visit Date: February 5, 2021    Provider: ENOCH Rose   Location: [facility]   Location Address: [address] [place] [address], [address] [place] [address]  [id number]   Location Phone: [phone]          Chief Complaint  · f/u labs  · wants to discuss meds  · needs refills on meds      History Of Present Illness  Taqueria Soto is a 70 year old /White male who presents for evaluation and treatment of:      Patient presents to the office today for a 6-month follow-up regarding his diabetes, hypertension, hyperlipidemia.  I did review recent lab work with the patient including his A1c of 6.1%.  Patient does state that he is currently taking 46 units of Lantus.  He states that his fasting glucose is generally 110-115.  Patient's blood pressure on arrival today is 141/73.  He denies any chest pain shortness breath palpitations this time.  I did review his lipid panel which was unremarkable.  I explained that his dosage was therapeutic and we will continue this.  He denies any other concerns or complaints at this time.  He does state that he never received a Cologuard and would like to have this reordered.       Past Medical History  Arthritis; Cataract; Cervical radiculopathy; Cervicalgia; COPD (chronic obstructive pulmonary disease); Deafness; Diabetes mellitus, insulin dependent (IDDM), controlled; Diabetes mellitus, type 2; Diabetes Mellitus, Type II; Emphysema; Essential hypertension; Facet arthritis of cervical region; Hematuria (Microscopic); Hemorrhoids; High cholesterol; Hyperlipidemia; Hypertension; Prostatitis, unspecified; Reflux; Shortness of Breath; Sinus trouble; Sleep apnea         Past Surgical History  Cataract exctraction with lens implant         Medication List  Alcohol Pads  Pt has thrown call light on the floor multiple times and then asked where the TV changer was. RN attempts to reorient. Pt responds with okay okay, I know, unclear if pt understands they are the same item. "topical pads, medicated; Allegra Allergy 180 mg oral tablet; aspirin 81 mg oral tablet,chewable; atorvastatin 40 mg oral tablet; Betimol 0.5 % ophthalmic (eye) drops; brimonidine 0.15 % ophthalmic (eye) drops; cyclobenzaprine 10 mg oral tablet; finasteride 5 mg oral tablet; FreeStyle Lancets 28 gauge miscellaneous misc; gabapentin 300 mg oral capsule; ICaps AREDS2 250 mg-200 unit -12.5 mg-1 mg oral capsule; Janumet XR 50-1,000 mg oral tablet, ER multiphase 24 hr; Lantus Solostar U-100 Insulin 100 unit/mL (3 mL) subcutaneous insulin pen; Lotrel 10-20 mg oral capsule; meloxicam 15 mg oral tablet; multivitamin oral tablet; omeprazole 40 mg oral capsule,delayed release(DR/EC); pen needle, diabetic 32 gauge x 3/16\" miscellaneous needle; Precision Xtra Test miscellaneous strip; PreserVision AREDS-2 [id number] mg-unit-mg-mg oral capsule; Singulair 10 mg oral tablet; tamsulosin 0.4 mg oral capsule         Allergy List  NO KNOWN DRUG ALLERGIES       Allergies Reconciled  Family Medical History  Family history of Arthritis; Family history of diabetes mellitus         Social History  Alcohol (Current some day); lives with spouse; ; Retired; Tobacco (Former)         Immunizations  Name Date Admin   COVID Moderna 01/20/2021   Influenza 09/01/2019         Review of Systems  · Constitutional  o Denies  o : fatigue, night sweats  · Eyes  o Denies  o : double vision, blurred vision  · HENT  o Denies  o : vertigo, recent head injury  · Breasts  o Denies  o : abnormal changes in breast size, additional breast symptoms except as noted in the HPI  · Cardiovascular  o Denies  o : chest pain, irregular heart beats  · Respiratory  o Denies  o : shortness of breath, productive cough  · Gastrointestinal  o Denies  o : nausea, vomiting  · Genitourinary  o Denies  o : dysuria, urinary retention  · Integument  o Denies  o : hair growth change, new skin lesions  · Neurologic  o Denies  o : altered mental status, " seizures  · Musculoskeletal  o Denies  o : joint swelling, limitation of motion  · Endocrine  o Denies  o : cold intolerance, heat intolerance  · Heme-Lymph  o Denies  o : petechiae, lymph node enlargement or tenderness  · Allergic-Immunologic  o Denies  o : frequent illnesses      Vitals  Date Time BP Position Site L\R Cuff Size HR RR TEMP (F) WT  HT  BMI kg/m2 BSA m2 O2 Sat FR L/min FiO2 HC       02/05/2021 09:26 /73 Sitting    77 - R  97.5 264lbs 0oz    96 %            Physical Examination  · Constitutional  o Appearance  o : well developed, well-nourished, no obvious deformities present  · Neck  o Inspection/Palpation  o : normal appearance, no masses or tenderness, trachea midline  o Range of Motion  o : cervical range of motion within normal limits  o Thyroid  o : gland size normal, nontender, no nodules or masses present on palpation  · Respiratory  o Respiratory Effort  o : breathing unlabored  o Inspection of Chest  o : normal appearance  o Auscultation of Lungs  o : normal breath sounds throughout  · Cardiovascular  o Heart  o :   § Auscultation of Heart  § : regular rate and rhythm, no murmurs, gallops or rubs  o Peripheral Vascular System  o :   § Extremities  § : no cyanosis, clubbing or edema  · Lymphatic  o Neck  o : no lymphadenopathy present  · Musculoskeletal  o Spine  o :   § Inspection/Palpation  § : no spinal tenderness, scoliosis or kyphosis present  § Range of Motion  § : spine range of motion normal  o Right Upper Extremity  o :   § Inspection/Palpation  § : no tenderness to palpation, ROM normal  o Left Upper Extremity  o :   § Inspection/Palpation  § : no tenderness to palpation, ROM normal  o Right Lower Extremity  o :   § Inspection/Palpation  § : no joint or limb tenderness to palpation, ROM normal  o Left Lower Extremity  o :   § Inspection/Palpation  § : no joint or limb tenderness to palpation, ROM normal  · Skin and Subcutaneous Tissue  o General Inspection  o : no rashes or  lesions present, no areas of discoloration  o Body Hair  o : hair normal for age, general body hair distribution normal for age  o Digits and Nails  o : no clubbing, cyanosis, deformities or edema present, normal appearing nails  · Neurologic  o Mental Status Examination  o :   § Orientation  § : grossly oriented to person, place and time  o Gait and Station  o : normal gait, able to stand without difficulty  · Psychiatric  o General  o : Appropriate mood and affect noted  o Mood and Affect  o : mood normal, affect appropriate          Assessment  · Diabetes mellitus, type 2     250.00/E11.9  · Essential hypertension     401.9/I10  · GERD (gastroesophageal reflux disease)     530.81/K21.9  · Hyperlipidemia     272.4/E78.5  · Lumbago     724.2/M54.5      Plan  · Orders  o Diabetes 2 Panel (Urine Microalbumin, CMP, Lipid, A1c, ) [facility] (23633, 57384, 24847, 93545) - 250.00/E11.9 - 08/05/2021  o CBC with Auto Diff [facility] (65608) - - 08/05/2021  o ACO-14: Influenza immunization administered or previously received [facility] () - - 02/05/2021  o ACO-13: Fall Risk Screening with no falls in past year or only one fall without injury in the past year (1101F) - - 02/05/2021  o ACO-39: Current medications updated and reviewed (1159F, ) - - 02/05/2021  · Medications  o aspirin 81 mg oral tablet,chewable   SIG: chew 2 tablets by oral route daily for 90 days   DISP: (180) Tablet with 3 refills  Adjusted on 02/05/2021     o Precision Xtra Test miscellaneous strip   SIG: Test 4 times daily/DX: E11.9   DISP: (4) Container with 5 refills  Refilled on 02/05/2021     o Medications have been Reconciled  o Transition of Care or Provider Policy  · Instructions  o Continue blood sugar monitoring daily and record. Bring your log to office visits. Call the office for readings below 70 and above 250 or any complications.  o Daily foot care. Avoid walking barefoot. Annual Dilated Eye Exam.  o Discussed with patient blood pressure monitoring,  hemoglobin A1C levels need to be below 7.0, and LDL (Lipid) goals below 70.  o Patient advised to monitor blood pressure (B/P) at home and journal readings. Patient informed that a B/P reading at home of more than 130/80 is considered hypertension. For readings greater [id number] or higher patient is advised to follow up in the office with readings for management. Patient advised to limit sodium intake.  o Advised that cheeses and other sources of dairy fats, animal fats, fast food, and the extras (candy, pastries, pies, doughnuts and cookies) all contain LDL raising nutrients. Advised to increase fruits, vegetables, whole grains, and to monitor portion sizes.   o Patient was educated/instructed on their diagnosis, treatment and medications prior to discharge from the clinic today.  o Electronically Identified Patient Education Materials Provided Electronically  · Disposition  o Call or Return if symptoms worsen or persist.  o follow up in 6 months            Electronically Signed by: ENOCH Rose -Author on February 5, 2021 12:54:09 PM

## 2021-08-24 ENCOUNTER — HOSPITAL ENCOUNTER (OUTPATIENT)
Dept: GENERAL RADIOLOGY | Facility: HOSPITAL | Age: 71
Discharge: HOME | End: 2021-08-24
Admitting: NURSE PRACTITIONER

## 2021-08-24 DIAGNOSIS — M79.674 PAIN OF TOE OF RIGHT FOOT: ICD-10-CM

## 2021-08-24 PROCEDURE — 73630 X-RAY EXAM OF FOOT: CPT

## 2021-08-30 DIAGNOSIS — M77.30 CALCANEAL SPUR, UNSPECIFIED LATERALITY: Primary | ICD-10-CM

## 2021-09-28 RX ORDER — ATORVASTATIN CALCIUM 40 MG/1
40 TABLET, FILM COATED ORAL DAILY
Qty: 90 TABLET | Refills: 1 | Status: SHIPPED | OUTPATIENT
Start: 2021-09-28 | End: 2022-02-09 | Stop reason: SDUPTHER

## 2021-09-28 RX ORDER — GABAPENTIN 300 MG/1
300 CAPSULE ORAL 3 TIMES DAILY
Qty: 270 CAPSULE | Refills: 1 | Status: SHIPPED | OUTPATIENT
Start: 2021-09-28 | End: 2021-10-26 | Stop reason: SDUPTHER

## 2021-10-14 ENCOUNTER — OFFICE VISIT (OUTPATIENT)
Dept: PODIATRY | Facility: CLINIC | Age: 71
End: 2021-10-14

## 2021-10-14 VITALS
TEMPERATURE: 97.6 F | WEIGHT: 256 LBS | SYSTOLIC BLOOD PRESSURE: 135 MMHG | BODY MASS INDEX: 35.84 KG/M2 | HEIGHT: 71 IN | DIASTOLIC BLOOD PRESSURE: 59 MMHG | HEART RATE: 76 BPM | OXYGEN SATURATION: 97 %

## 2021-10-14 DIAGNOSIS — M79.672 FOOT PAIN, BILATERAL: ICD-10-CM

## 2021-10-14 DIAGNOSIS — G62.9 NEUROPATHY: ICD-10-CM

## 2021-10-14 DIAGNOSIS — M79.671 FOOT PAIN, BILATERAL: ICD-10-CM

## 2021-10-14 DIAGNOSIS — Z77.098 AGENT ORANGE EXPOSURE: ICD-10-CM

## 2021-10-14 DIAGNOSIS — Z79.4 TYPE 2 DIABETES MELLITUS WITHOUT COMPLICATION, WITH LONG-TERM CURRENT USE OF INSULIN (HCC): Primary | ICD-10-CM

## 2021-10-14 DIAGNOSIS — G62.2 TOXIC NEUROPATHY (HCC): ICD-10-CM

## 2021-10-14 DIAGNOSIS — E11.9 TYPE 2 DIABETES MELLITUS WITHOUT COMPLICATION, WITH LONG-TERM CURRENT USE OF INSULIN (HCC): Primary | ICD-10-CM

## 2021-10-14 PROCEDURE — G8404 LOW EXTEMITY NEUR EXAM DOCUM: HCPCS | Performed by: PODIATRIST

## 2021-10-14 PROCEDURE — 99203 OFFICE O/P NEW LOW 30 MIN: CPT | Performed by: PODIATRIST

## 2021-10-14 RX ORDER — ANTIOX #8/OM3/DHA/EPA/LUT/ZEAX 250-2.5 MG
1 CAPSULE ORAL DAILY
COMMUNITY
Start: 2021-09-26 | End: 2022-04-25 | Stop reason: SDUPTHER

## 2021-10-14 RX ORDER — GLIPIZIDE 2.5 MG/1
2.5 TABLET, EXTENDED RELEASE ORAL DAILY
COMMUNITY
End: 2022-02-09

## 2021-10-14 RX ORDER — BLOOD SUGAR DIAGNOSTIC
STRIP MISCELLANEOUS AS NEEDED
COMMUNITY
Start: 2021-09-26 | End: 2022-04-25 | Stop reason: SDUPTHER

## 2021-10-14 RX ORDER — OFLOXACIN 3 MG/ML
SOLUTION/ DROPS OPHTHALMIC
COMMUNITY
Start: 2021-09-26

## 2021-10-14 NOTE — PROGRESS NOTES
[facility] - PODIATRY    Today's Date: 10/14/21    Patient Name: Taqueria Soto  MRN: [id number]  CSN: [id number]  PCP: Edgar Briscoe APRN  Referring Provider: Edgar Briscoe APRN    SUBJECTIVE     Chief Complaint   Patient presents with   • Right Foot - Heel Spurs, Pain     HPI: Taqueria Soto, a 71 y.o.male, presents to clinic for a diabetic foot evaluation.    New, Established, New Problem:  new    Location:  B/L feet    Duration:  > than one year    Onset:  insidious    Nature:  Burning and tingling    Stable, worsening, improving: Worsening    Patient controlling diabetes via:  insulin    Patient denies any fevers, chills, nausea, vomiting, shortness of breath, nor any other constitutional signs nor symptoms.    No other pedal complaints at this time.    Patient relates being in country during the [place] with agent orange exposure.    Past Medical History:   Diagnosis Date   • Acid reflux    • Arthritis    • Cataract    • Cervical radiculopathy 01/14/2019   • Cervicalgia 01/14/2019   • COPD (chronic obstructive pulmonary disease) ([facility])    • Deafness    • Diabetes ([facility])    • Diabetes mellitus, type 2 ([facility]) 07/24/2018   • Emphysema lung ([facility])    • Essential hypertension 07/24/2018   • Facet arthritis of cervical region 01/14/2019   • Hematuria, microscopic    • Hemorrhoids    • High cholesterol    • Hyperlipidemia 07/24/2018   • Hypertension    • Insulin dependent type 2 diabetes mellitus, controlled ([facility])    • Prostatitis     Unspecified   • Shortness of breath    • Sinus trouble    • Sleep apnea      Past Surgical History:   Procedure Laterality Date   • CATARACT EXTRACTION WITH INTRAOCULAR LENS IMPLANT       Family History   Problem Relation Age of Onset   • Arthritis Father    • Diabetes Sister         Unspecified Type, Mellitus   • No Known Problems Mother      Social History     Socioeconomic History   • Marital status:    Tobacco Use   • Smoking status: Former Smoker   •  Smokeless tobacco: Never Used   Vaping Use   • Vaping Use: Never used   Substance and Sexual Activity   • Alcohol use: Yes     Comment: occas   • Drug use: Never   • Sexual activity: Defer     Allergies   Allergen Reactions   • Nitroglycerin Other (See Comments)     Heart rate dropped down  Other reaction(s): Other (See Comments)  Heart rate dropped down     Current Outpatient Medications   Medication Sig Dispense Refill   • albuterol sulfate  (90 Base) MCG/ACT inhaler Inhale 2 puffs Every 4 (Four) Hours As Needed for Wheezing.     • amLODIPine-benazepril (LOTREL) 10-20 MG per capsule Take 1 capsule by mouth Daily. 90 capsule 1   • aspirin 81 MG EC tablet Take 1 tablet by mouth Daily. (Patient taking differently: Take  by mouth Daily. Take 2 daily) 90 tablet 3   • atorvastatin (LIPITOR) 40 MG tablet Take 1 tablet by mouth Daily. 90 tablet 1   • brimonidine (ALPHAGAN) 0.15 % ophthalmic solution brimonidine 0.15 % eye drops     • cyclobenzaprine (FLEXERIL) 10 MG tablet Take 1 tablet by mouth Daily.     • fexofenadine (ALLEGRA) 180 MG tablet Take 1 tablet by mouth Daily. 90 tablet 1   • finasteride (PROSCAR) 5 MG tablet Take 1 tablet by mouth Daily. 90 tablet 1   • gabapentin (NEURONTIN) 300 MG capsule Take 1 capsule by mouth 3 (Three) Times a Day. 270 capsule 1   • glipizide (GLUCOTROL XL) 2.5 MG 24 hr tablet Take 2.5 mg by mouth Daily.     • Insulin Glargine (LANTUS SOLOSTAR) 100 UNIT/ML injection pen Inject 42 Units under the skin into the appropriate area as directed Daily. 12 pen 1   • meloxicam (MOBIC) 15 MG tablet Take 1 tablet by mouth Daily. 90 tablet 1   • montelukast (SINGULAIR) 10 MG tablet Take 10 mg by mouth Every Night.     • multivitamins-minerals (PRESERVISION AREDS 2) capsule capsule 1 capsule Daily.     • ofloxacin (OCUFLOX) 0.3 % ophthalmic solution      • omeprazole (priLOSEC) 40 MG capsule Take 1 capsule by mouth Daily. 90 capsule 1   • PRECISION XTRA TEST STRIPS test strip As Needed.     •  SITagliptin-metFORMIN HCl ER (Janumet XR)  MG tablet Take 2 tablets by mouth Daily. 180 tablet 1   • tamsulosin (FLOMAX) 0.4 MG capsule 24 hr capsule Take 1 capsule by mouth Daily.     • timolol (TIMOPTIC-XR) 0.5 % ophthalmic gel-forming 1 drop Daily.       No current facility-administered medications for this visit.     Review of Systems   Constitutional: Negative.    Neurological: Positive for numbness.   All other systems reviewed and are negative.      OBJECTIVE     Vitals:    10/14/21 1011   BP: 135/59   Pulse: 76   Temp: 97.6 °F (36.4 °C)   SpO2: 97%       Body mass index is 36.21 kg/m².    Lab Results   Component Value Date    HGBA1C 6.00 (H) 07/20/2021       Lab Results   Component Value Date    GLUCOSE 77 07/20/2021    CALCIUM 9.1 07/20/2021     07/20/2021    K 4.1 07/20/2021    CO2 23.4 07/20/2021     07/20/2021    BUN 14 07/20/2021    CREATININE 0.84 07/20/2021    EGFRIFNONA 90 07/20/2021    BCR 16.7 07/20/2021    ANIONGAP 8.6 07/20/2021       Patient seen in no apparent distress.      PHYSICAL EXAM:     Foot/Ankle Exam:       General:   Diabetic Foot Exam Performed    Appearance: obesity and elderly    Orientation: AAOx3    Affect: appropriate    Gait: unimpaired    Shoe Gear:  Casual shoes    VASCULAR      Right Foot Vascularity   Normal vascular exam    Dorsalis pedis:  2+  Posterior tibial:  2+  Skin Temperature: warm    Edema Grading:  None  CFT:  < 3 seconds  Pedal Hair Growth:  Present  Varicosities: mild varicosities       Left Foot Vascularity   Normal vascular exam    Dorsalis pedis:  2+  Posterior tibial:  2+  Skin Temperature: warm    Edema Grading:  None  CFT:  < 3 seconds  Pedal Hair Growth:  Present  Varicosities: mild varicosities        NEUROLOGIC     Right Foot Neurologic   Light touch sensation:  Diminished  Vibratory sensation:  Diminished  Hot/Cold sensation: diminished    Protective Sensation using [place] Monofilament:  4     Left Foot Neurologic   Light  touch sensation:  Diminished  Vibratory sensation:  Diminished  Hot/cold sensation: diminished    Protective Sensation using [place] Monofilament:  4     MUSCLE STRENGTH     Right Foot Muscle Strength   Normal strength    Foot dorsiflexion:  5  Foot plantar flexion:  5  Foot inversion:  5  Foot eversion:  5     Left Foot Muscle Strength   Foot dorsiflexion:  5  Foot plantar flexion:  5  Foot inversion:  5  Foot eversion:  5     RANGE OF MOTION      Right Foot Range of Motion   Foot and ankle ROM within normal limits       Left Foot Range of Motion   Foot and ankle ROM within normal limits       DERMATOLOGIC     Right Foot Dermatologic   Skin: skin intact    Nails: normal       Left Foot Dermatologic   Skin: skin intact    Nails: normal        Diabetic Foot Exam Performed      ASSESSMENT/PLAN     Diagnoses and all orders for this visit:    1. Type 2 diabetes mellitus without complication, with long-term current use of insulin (HCC) (Primary)    2. Neuropathy    3. Foot pain, bilateral    4. Agent orange exposure    5. Toxic neuropathy (HCC)        Comprehensive lower extremity examination and evaluation was performed.    Discussed findings and treatment plan including risks, benefits, and treatment options with patient in detail. Patient agreed with treatment plan.    Prescribed house onset neuropathic compounded medication.    Diabetic foot exam performed and documented this date, compliant with CQM required standards. Detail of findings as noted in physical exam.  Lower extremity Neurologic exam for diabetic patient performed and documented this date, compliant with PQRS required standards. Detail of findings as noted in physical exam.  Advised patient importance of good routine lower extremity hygiene. Advised patient importance of evaluating for intact skin and pain free nail borders.  Advised patient to use mirror to evaluate plantar/ soles of feet for better visualization. Advised patient monitor and  phone office to be seen if any cracking to skin, open lesions, painful nail borders or if nails become elongated prior to next visit. Advised patient importance of daily cleansing of lower extremities, followed by good skin cream to maintain normal hydration of skin. Also advised patient importance of close daily monitoring of blood sugar. Advised to regulate diet and medications to maintain control of blood sugar in optimal range. Contact primary care provider if difficulties maintaining blood sugar levels.  Advised Patient of presence of Diabetes Mellitus condition.  Advised Patient risk of progression and worsening or improvement, then return of condition.  Will monitor condition for any change in future. Treat with most appropriate treatment pending status of condition.  Counseled and advised patient extensively on nature and ramifications of diabetes. Standard instructions given to patient for good diabetic foot care and maintenance. Advised importance of careful monitoring to avoid break down and complications secondary to diabetes. Advised patient importance of strict maintenance of blood sugar control. Advised patient of possible ominous results from neglect of condition, i.e.: amputation/ loss of digits, feet and legs, or even death.  Patient states understands counseling, will monitor closely, continue good hygiene and routine diabetic foot care. Patient will contact office is questions or problems.      An After Visit Summary was printed and given to the patient at discharge, including (if requested) any available informative/educational handouts regarding diagnosis, treatment, or medications. All questions were answered to patient/family satisfaction. Should symptoms fail to improve or worsen they agree to call or return to clinic or to go to the Emergency Department. Discussed the importance of following up with any needed screening tests/labs/specialist appointments and any requested follow-up  recommended by me today. Importance of maintaining follow-up discussed and patient accepts that missed appointments can delay diagnosis and potentially lead to worsening of conditions.    Return in about 1 year (around 10/14/2022) for Podiatry Diabetic Foot Exam., or sooner if acute issues arise.    This document has been electronically signed by Pedro Schwarz DPM on October 14, 2021 10:46 EDT

## 2021-10-26 RX ORDER — GABAPENTIN 300 MG/1
300 CAPSULE ORAL 3 TIMES DAILY
Qty: 270 CAPSULE | Refills: 1 | Status: SHIPPED | OUTPATIENT
Start: 2021-10-26

## 2021-12-10 DIAGNOSIS — K21.9 GASTROESOPHAGEAL REFLUX DISEASE, UNSPECIFIED WHETHER ESOPHAGITIS PRESENT: ICD-10-CM

## 2021-12-10 DIAGNOSIS — T78.40XD ALLERGY, SUBSEQUENT ENCOUNTER: ICD-10-CM

## 2021-12-10 RX ORDER — AMLODIPINE BESYLATE AND BENAZEPRIL HYDROCHLORIDE 10; 20 MG/1; MG/1
1 CAPSULE ORAL DAILY
Qty: 90 CAPSULE | Refills: 1 | Status: SHIPPED | OUTPATIENT
Start: 2021-12-10 | End: 2022-04-25 | Stop reason: SDUPTHER

## 2021-12-10 RX ORDER — MELOXICAM 15 MG/1
15 TABLET ORAL DAILY
Qty: 90 TABLET | Refills: 1 | Status: SHIPPED | OUTPATIENT
Start: 2021-12-10 | End: 2022-02-09

## 2021-12-10 RX ORDER — SITAGLIPTIN AND METFORMIN HYDROCHLORIDE 1000; 50 MG/1; MG/1
2 TABLET, FILM COATED, EXTENDED RELEASE ORAL DAILY
Qty: 180 TABLET | Refills: 1 | Status: SHIPPED | OUTPATIENT
Start: 2021-12-10 | End: 2022-04-25 | Stop reason: SDUPTHER

## 2021-12-10 RX ORDER — ASPIRIN 81 MG/1
81 TABLET ORAL DAILY
Qty: 90 TABLET | Refills: 3 | Status: SHIPPED | OUTPATIENT
Start: 2021-12-10 | End: 2022-04-25 | Stop reason: SDUPTHER

## 2021-12-10 RX ORDER — FEXOFENADINE HCL 180 MG/1
180 TABLET ORAL DAILY
Qty: 90 TABLET | Refills: 1 | Status: SHIPPED | OUTPATIENT
Start: 2021-12-10 | End: 2022-04-25 | Stop reason: SDUPTHER

## 2021-12-10 RX ORDER — OMEPRAZOLE 40 MG/1
40 CAPSULE, DELAYED RELEASE ORAL DAILY
Qty: 90 CAPSULE | Refills: 1 | Status: SHIPPED | OUTPATIENT
Start: 2021-12-10 | End: 2022-04-25 | Stop reason: SDUPTHER

## 2022-02-01 ENCOUNTER — LAB (OUTPATIENT)
Dept: LAB | Facility: HOSPITAL | Age: 72
End: 2022-02-01

## 2022-02-01 DIAGNOSIS — K21.9 GASTROESOPHAGEAL REFLUX DISEASE WITHOUT ESOPHAGITIS: ICD-10-CM

## 2022-02-01 DIAGNOSIS — Z79.4 TYPE 2 DIABETES MELLITUS WITHOUT COMPLICATION, WITH LONG-TERM CURRENT USE OF INSULIN: ICD-10-CM

## 2022-02-01 DIAGNOSIS — E11.9 TYPE 2 DIABETES MELLITUS WITHOUT COMPLICATION, WITH LONG-TERM CURRENT USE OF INSULIN: ICD-10-CM

## 2022-02-01 DIAGNOSIS — I10 ESSENTIAL HYPERTENSION: ICD-10-CM

## 2022-02-01 DIAGNOSIS — E78.5 HYPERLIPIDEMIA, UNSPECIFIED HYPERLIPIDEMIA TYPE: ICD-10-CM

## 2022-02-01 LAB
ALBUMIN SERPL-MCNC: 4.4 G/DL (ref 3.5–5.2)
ALBUMIN/GLOB SERPL: 1.7 G/DL
ALP SERPL-CCNC: 81 U/L (ref 39–117)
ALT SERPL W P-5'-P-CCNC: 18 U/L (ref 1–41)
ANION GAP SERPL CALCULATED.3IONS-SCNC: 10.5 MMOL/L (ref 5–15)
AST SERPL-CCNC: 20 U/L (ref 1–40)
BILIRUB SERPL-MCNC: 0.4 MG/DL (ref 0–1.2)
BUN SERPL-MCNC: 14 MG/DL (ref 8–23)
BUN/CREAT SERPL: 14.7 (ref 7–25)
CALCIUM SPEC-SCNC: 9.4 MG/DL (ref 8.6–10.5)
CHLORIDE SERPL-SCNC: 103 MMOL/L (ref 98–107)
CHOLEST SERPL-MCNC: 130 MG/DL (ref 0–200)
CO2 SERPL-SCNC: 25.5 MMOL/L (ref 22–29)
CREAT SERPL-MCNC: 0.95 MG/DL (ref 0.76–1.27)
DEPRECATED RDW RBC AUTO: 40.7 FL (ref 37–54)
ERYTHROCYTE [DISTWIDTH] IN BLOOD BY AUTOMATED COUNT: 13.3 % (ref 12.3–15.4)
GFR SERPL CREATININE-BSD FRML MDRD: 78 ML/MIN/1.73
GLOBULIN UR ELPH-MCNC: 2.6 GM/DL
GLUCOSE SERPL-MCNC: 97 MG/DL (ref 65–99)
HBA1C MFR BLD: 7.04 % (ref 4.8–5.6)
HCT VFR BLD AUTO: 40.7 % (ref 37.5–51)
HDLC SERPL-MCNC: 41 MG/DL (ref 40–60)
HGB BLD-MCNC: 13.5 G/DL (ref 13–17.7)
LDLC SERPL CALC-MCNC: 70 MG/DL (ref 0–100)
LDLC/HDLC SERPL: 1.67 {RATIO}
MCH RBC QN AUTO: 27.9 PG (ref 26.6–33)
MCHC RBC AUTO-ENTMCNC: 33.2 G/DL (ref 31.5–35.7)
MCV RBC AUTO: 84.1 FL (ref 79–97)
PLATELET # BLD AUTO: 211 10*3/MM3 (ref 140–450)
PMV BLD AUTO: 11.9 FL (ref 6–12)
POTASSIUM SERPL-SCNC: 4.3 MMOL/L (ref 3.5–5.2)
PROT SERPL-MCNC: 7 G/DL (ref 6–8.5)
RBC # BLD AUTO: 4.84 10*6/MM3 (ref 4.14–5.8)
SODIUM SERPL-SCNC: 139 MMOL/L (ref 136–145)
TRIGL SERPL-MCNC: 102 MG/DL (ref 0–150)
VLDLC SERPL-MCNC: 19 MG/DL (ref 5–40)
WBC NRBC COR # BLD: 7.02 10*3/MM3 (ref 3.4–10.8)

## 2022-02-01 PROCEDURE — 80061 LIPID PANEL: CPT

## 2022-02-01 PROCEDURE — 83036 HEMOGLOBIN GLYCOSYLATED A1C: CPT

## 2022-02-01 PROCEDURE — 80053 COMPREHEN METABOLIC PANEL: CPT

## 2022-02-01 PROCEDURE — 85027 COMPLETE CBC AUTOMATED: CPT

## 2022-02-01 PROCEDURE — 36415 COLL VENOUS BLD VENIPUNCTURE: CPT

## 2022-02-09 ENCOUNTER — OFFICE VISIT (OUTPATIENT)
Dept: FAMILY MEDICINE CLINIC | Facility: CLINIC | Age: 72
End: 2022-02-09

## 2022-02-09 ENCOUNTER — TELEPHONE (OUTPATIENT)
Dept: FAMILY MEDICINE CLINIC | Facility: CLINIC | Age: 72
End: 2022-02-09

## 2022-02-09 VITALS
TEMPERATURE: 98 F | HEART RATE: 72 BPM | HEIGHT: 72 IN | BODY MASS INDEX: 35.33 KG/M2 | WEIGHT: 260.8 LBS | SYSTOLIC BLOOD PRESSURE: 118 MMHG | DIASTOLIC BLOOD PRESSURE: 62 MMHG | OXYGEN SATURATION: 96 %

## 2022-02-09 DIAGNOSIS — E78.5 HYPERLIPIDEMIA, UNSPECIFIED HYPERLIPIDEMIA TYPE: ICD-10-CM

## 2022-02-09 DIAGNOSIS — E11.65 TYPE 2 DIABETES MELLITUS WITH HYPERGLYCEMIA, WITHOUT LONG-TERM CURRENT USE OF INSULIN: Primary | ICD-10-CM

## 2022-02-09 DIAGNOSIS — K21.9 GASTROESOPHAGEAL REFLUX DISEASE, UNSPECIFIED WHETHER ESOPHAGITIS PRESENT: ICD-10-CM

## 2022-02-09 DIAGNOSIS — I10 PRIMARY HYPERTENSION: ICD-10-CM

## 2022-02-09 DIAGNOSIS — M54.12 CERVICAL RADICULOPATHY: ICD-10-CM

## 2022-02-09 DIAGNOSIS — N40.0 BENIGN PROSTATIC HYPERPLASIA WITHOUT LOWER URINARY TRACT SYMPTOMS: ICD-10-CM

## 2022-02-09 PROCEDURE — 99214 OFFICE O/P EST MOD 30 MIN: CPT | Performed by: NURSE PRACTITIONER

## 2022-02-09 RX ORDER — MONTELUKAST SODIUM 10 MG/1
10 TABLET ORAL NIGHTLY
COMMUNITY
End: 2022-02-09 | Stop reason: SDUPTHER

## 2022-02-09 RX ORDER — MONTELUKAST SODIUM 10 MG/1
10 TABLET ORAL NIGHTLY
Qty: 90 TABLET | Refills: 1 | Status: SHIPPED | OUTPATIENT
Start: 2022-02-09 | End: 2022-04-25 | Stop reason: SDUPTHER

## 2022-02-09 RX ORDER — MELOXICAM 15 MG/1
15 TABLET ORAL DAILY
COMMUNITY
End: 2022-04-25 | Stop reason: SDUPTHER

## 2022-02-09 RX ORDER — FINASTERIDE 5 MG/1
5 TABLET, FILM COATED ORAL DAILY
Qty: 90 TABLET | Refills: 1 | Status: SHIPPED | OUTPATIENT
Start: 2022-02-09 | End: 2022-08-09 | Stop reason: SDUPTHER

## 2022-02-09 RX ORDER — ATORVASTATIN CALCIUM 40 MG/1
40 TABLET, FILM COATED ORAL DAILY
Qty: 90 TABLET | Refills: 1 | Status: SHIPPED | OUTPATIENT
Start: 2022-02-09 | End: 2022-04-25 | Stop reason: SDUPTHER

## 2022-02-09 RX ORDER — CYCLOBENZAPRINE HCL 10 MG
10 TABLET ORAL DAILY
Qty: 90 TABLET | Refills: 1 | Status: SHIPPED | OUTPATIENT
Start: 2022-02-09

## 2022-02-09 NOTE — TELEPHONE ENCOUNTER
Patient wife is calling about his medication list. She has a couple of questions regarding the meloxicam and montelukast. Patient stating they are not on his med list and was concerned on why they were not on there if Dr Briscoe prescribed them in the past. Just wanting a call back today if possible.

## 2022-02-09 NOTE — PROGRESS NOTES
"Chief Complaint  Hypertension (6 month follow up)    Subjective          Taqueria Soto presents to [facility]  Presents to the office today for 6-month follow-up regarding his diabetes, hyperlipidemia and hypertension  Pressure is 118/62 upon arrival today he denies any chest pain shortness breath palpitations time.  Patient does state that his ears have been bothering him and he like to have them looked at.  Does state that the pain is intermittent but is not constant.  He denies any fevers.  Does have an appointment with the audiologist next week.  I did review recent lab work with the patient including his A1c of 7.4%.  This was a 1.4% increase from his previous A1c.  I did discuss healthy diet with the patient.  Reducing the carbohydrates and sweets.  Patient does state that it is difficult for him to be active with the weather.  He states that in the springtime he is more active outside.      Objective   Vital Signs:   /62 (BP Location: Right arm, Patient Position: Sitting, Cuff Size: Adult)   Pulse 72   Temp 98 °F (36.7 °C) (Temporal)   Ht 181.6 cm (71.5\")   Wt 118 kg (260 lb 12.8 oz)   SpO2 96%   BMI 35.87 kg/m²     Physical Exam  Vitals reviewed.   Constitutional:       Appearance: Normal appearance.   HENT:      Right Ear: Tympanic membrane, ear canal and external ear normal.      Left Ear: Tympanic membrane, ear canal and external ear normal.      Nose: Nose normal.   Cardiovascular:      Rate and Rhythm: Normal rate and regular rhythm.      Heart sounds: Normal heart sounds, S1 normal and S2 normal. No murmur heard.      Pulmonary:      Effort: Pulmonary effort is normal. No respiratory distress.      Breath sounds: Normal breath sounds.   Musculoskeletal:         General: Normal range of motion.   Skin:     General: Skin is warm and dry.   Neurological:      Mental Status: He is alert and oriented to person, place, and time.   Psychiatric:         Attention " and Perception: Attention normal.         Mood and Affect: Mood normal.         Behavior: Behavior normal.        Result Review :                 Assessment and Plan    Diagnoses and all orders for this visit:    1. Type 2 diabetes mellitus with hyperglycemia, without long-term current use of insulin (HCC) (Primary)  -     CBC (No Diff); Future  -     Comprehensive Metabolic Panel; Future  -     Hemoglobin A1c; Future  -     Insulin Glargine (LANTUS SOLOSTAR) 100 UNIT/ML injection pen; Inject 42 Units under the skin into the appropriate area as directed Daily.  Dispense: 12 pen; Refill: 1    2. Hyperlipidemia, unspecified hyperlipidemia type  -     CBC (No Diff); Future  -     Lipid Panel; Future  -     atorvastatin (LIPITOR) 40 MG tablet; Take 1 tablet by mouth Daily.  Dispense: 90 tablet; Refill: 1    3. Primary hypertension  -     CBC (No Diff); Future  -     Comprehensive Metabolic Panel; Future    4. Benign prostatic hyperplasia without lower urinary tract symptoms  -     finasteride (PROSCAR) 5 MG tablet; Take 1 tablet by mouth Daily.  Dispense: 90 tablet; Refill: 1    5. Cervical radiculopathy  -     cyclobenzaprine (FLEXERIL) 10 MG tablet; Take 1 tablet by mouth Daily.  Dispense: 90 tablet; Refill: 1    6. Gastroesophageal reflux disease, unspecified whether esophagitis present        Follow Up   Return in about 6 months (around 8/9/2022) for Recheck.  Patient was given instructions and counseling regarding his condition or for health maintenance advice. Please see specific information pulled into the AVS if appropriate.

## 2022-04-25 DIAGNOSIS — E78.5 HYPERLIPIDEMIA, UNSPECIFIED HYPERLIPIDEMIA TYPE: ICD-10-CM

## 2022-04-25 DIAGNOSIS — K21.9 GASTROESOPHAGEAL REFLUX DISEASE, UNSPECIFIED WHETHER ESOPHAGITIS PRESENT: ICD-10-CM

## 2022-04-25 DIAGNOSIS — T78.40XD ALLERGY, SUBSEQUENT ENCOUNTER: ICD-10-CM

## 2022-04-25 DIAGNOSIS — E11.65 TYPE 2 DIABETES MELLITUS WITH HYPERGLYCEMIA, WITHOUT LONG-TERM CURRENT USE OF INSULIN: ICD-10-CM

## 2022-04-25 RX ORDER — AMLODIPINE BESYLATE AND BENAZEPRIL HYDROCHLORIDE 10; 20 MG/1; MG/1
1 CAPSULE ORAL DAILY
Qty: 90 CAPSULE | Refills: 1 | Status: SHIPPED | OUTPATIENT
Start: 2022-04-25 | End: 2022-08-09 | Stop reason: SDUPTHER

## 2022-04-25 RX ORDER — OFLOXACIN 3 MG/ML
SOLUTION/ DROPS OPHTHALMIC
Status: CANCELLED | OUTPATIENT
Start: 2022-04-25

## 2022-04-25 RX ORDER — TIMOLOL MALEATE 5 MG/ML
1 SOLUTION OPHTHALMIC DAILY
Status: CANCELLED | OUTPATIENT
Start: 2022-04-25

## 2022-04-25 RX ORDER — FEXOFENADINE HCL 180 MG/1
180 TABLET ORAL DAILY
Qty: 90 TABLET | Refills: 1 | Status: SHIPPED | OUTPATIENT
Start: 2022-04-25 | End: 2022-08-09 | Stop reason: SDUPTHER

## 2022-04-25 RX ORDER — OMEPRAZOLE 40 MG/1
40 CAPSULE, DELAYED RELEASE ORAL DAILY
Qty: 90 CAPSULE | Refills: 1 | Status: SHIPPED | OUTPATIENT
Start: 2022-04-25 | End: 2022-08-09 | Stop reason: SDUPTHER

## 2022-04-25 RX ORDER — SITAGLIPTIN AND METFORMIN HYDROCHLORIDE 1000; 50 MG/1; MG/1
2 TABLET, FILM COATED, EXTENDED RELEASE ORAL DAILY
Qty: 180 TABLET | Refills: 1 | Status: SHIPPED | OUTPATIENT
Start: 2022-04-25 | End: 2022-08-09 | Stop reason: SDUPTHER

## 2022-04-25 RX ORDER — ASPIRIN 81 MG/1
81 TABLET ORAL DAILY
Qty: 90 TABLET | Refills: 3 | Status: SHIPPED | OUTPATIENT
Start: 2022-04-25 | End: 2023-02-10 | Stop reason: SDUPTHER

## 2022-04-25 RX ORDER — MONTELUKAST SODIUM 10 MG/1
10 TABLET ORAL NIGHTLY
Qty: 90 TABLET | Refills: 1 | Status: SHIPPED | OUTPATIENT
Start: 2022-04-25 | End: 2022-08-09 | Stop reason: SDUPTHER

## 2022-04-25 RX ORDER — ATORVASTATIN CALCIUM 40 MG/1
40 TABLET, FILM COATED ORAL DAILY
Qty: 90 TABLET | Refills: 1 | Status: SHIPPED | OUTPATIENT
Start: 2022-04-25 | End: 2022-08-09 | Stop reason: SDUPTHER

## 2022-04-25 RX ORDER — BLOOD SUGAR DIAGNOSTIC
1 STRIP MISCELLANEOUS AS NEEDED
Qty: 100 EACH | Refills: 2 | Status: SHIPPED | OUTPATIENT
Start: 2022-04-25 | End: 2022-08-09

## 2022-04-25 RX ORDER — MELOXICAM 15 MG/1
15 TABLET ORAL DAILY
Qty: 90 TABLET | Refills: 1 | Status: SHIPPED | OUTPATIENT
Start: 2022-04-25 | End: 2022-08-09 | Stop reason: SDUPTHER

## 2022-04-25 RX ORDER — ANTIOX #8/OM3/DHA/EPA/LUT/ZEAX 250-2.5 MG
1 CAPSULE ORAL DAILY
Qty: 90 CAPSULE | Refills: 3 | Status: SHIPPED | OUTPATIENT
Start: 2022-04-25

## 2022-04-25 NOTE — TELEPHONE ENCOUNTER
PATIENT IS REQUESTING MEDICATION BE REFILLED AND SENT TO [facility].     PATIENTS WIFE IS ASKING TO BE CALLED ONCE THE MEDICATION IS CALLED IN PLEASE.

## 2022-06-10 DIAGNOSIS — I10 ESSENTIAL HYPERTENSION: Primary | ICD-10-CM

## 2022-06-10 DIAGNOSIS — E78.5 HYPERLIPIDEMIA, UNSPECIFIED HYPERLIPIDEMIA TYPE: ICD-10-CM

## 2022-06-10 DIAGNOSIS — E11.65 TYPE 2 DIABETES MELLITUS WITH HYPERGLYCEMIA, WITHOUT LONG-TERM CURRENT USE OF INSULIN: ICD-10-CM

## 2022-06-10 RX ORDER — TAMSULOSIN HYDROCHLORIDE 0.4 MG/1
1 CAPSULE ORAL DAILY
Qty: 90 CAPSULE | Refills: 3 | Status: SHIPPED | OUTPATIENT
Start: 2022-06-10

## 2022-08-02 ENCOUNTER — LAB (OUTPATIENT)
Dept: LAB | Facility: HOSPITAL | Age: 72
End: 2022-08-02

## 2022-08-02 DIAGNOSIS — E78.5 HYPERLIPIDEMIA, UNSPECIFIED HYPERLIPIDEMIA TYPE: ICD-10-CM

## 2022-08-02 DIAGNOSIS — I10 ESSENTIAL HYPERTENSION: ICD-10-CM

## 2022-08-02 DIAGNOSIS — I10 PRIMARY HYPERTENSION: ICD-10-CM

## 2022-08-02 DIAGNOSIS — E11.65 TYPE 2 DIABETES MELLITUS WITH HYPERGLYCEMIA, WITHOUT LONG-TERM CURRENT USE OF INSULIN: ICD-10-CM

## 2022-08-02 LAB
ALBUMIN SERPL-MCNC: 4.3 G/DL (ref 3.5–5.2)
ALBUMIN/GLOB SERPL: 1.6 G/DL
ALP SERPL-CCNC: 83 U/L (ref 39–117)
ALT SERPL W P-5'-P-CCNC: 17 U/L (ref 1–41)
ANION GAP SERPL CALCULATED.3IONS-SCNC: 9.8 MMOL/L (ref 5–15)
AST SERPL-CCNC: 22 U/L (ref 1–40)
BILIRUB SERPL-MCNC: 0.4 MG/DL (ref 0–1.2)
BUN SERPL-MCNC: 15 MG/DL (ref 8–23)
BUN/CREAT SERPL: 17.4 (ref 7–25)
CALCIUM SPEC-SCNC: 9.2 MG/DL (ref 8.6–10.5)
CHLORIDE SERPL-SCNC: 103 MMOL/L (ref 98–107)
CHOLEST SERPL-MCNC: 124 MG/DL (ref 0–200)
CO2 SERPL-SCNC: 24.2 MMOL/L (ref 22–29)
CREAT SERPL-MCNC: 0.86 MG/DL (ref 0.76–1.27)
DEPRECATED RDW RBC AUTO: 40.6 FL (ref 37–54)
EGFRCR SERPLBLD CKD-EPI 2021: 90.9 ML/MIN/1.73
ERYTHROCYTE [DISTWIDTH] IN BLOOD BY AUTOMATED COUNT: 13.5 % (ref 12.3–15.4)
GLOBULIN UR ELPH-MCNC: 2.7 GM/DL
GLUCOSE SERPL-MCNC: 103 MG/DL (ref 65–99)
HBA1C MFR BLD: 6.3 % (ref 4.8–5.6)
HCT VFR BLD AUTO: 39.7 % (ref 37.5–51)
HDLC SERPL-MCNC: 39 MG/DL (ref 40–60)
HGB BLD-MCNC: 13.1 G/DL (ref 13–17.7)
LDLC SERPL CALC-MCNC: 65 MG/DL (ref 0–100)
LDLC/HDLC SERPL: 1.62 {RATIO}
MCH RBC QN AUTO: 27.4 PG (ref 26.6–33)
MCHC RBC AUTO-ENTMCNC: 33 G/DL (ref 31.5–35.7)
MCV RBC AUTO: 83.1 FL (ref 79–97)
PLATELET # BLD AUTO: 189 10*3/MM3 (ref 140–450)
PMV BLD AUTO: 11.6 FL (ref 6–12)
POTASSIUM SERPL-SCNC: 4.4 MMOL/L (ref 3.5–5.2)
PROT SERPL-MCNC: 7 G/DL (ref 6–8.5)
RBC # BLD AUTO: 4.78 10*6/MM3 (ref 4.14–5.8)
SODIUM SERPL-SCNC: 137 MMOL/L (ref 136–145)
TRIGL SERPL-MCNC: 109 MG/DL (ref 0–150)
VLDLC SERPL-MCNC: 20 MG/DL (ref 5–40)
WBC NRBC COR # BLD: 7.09 10*3/MM3 (ref 3.4–10.8)

## 2022-08-02 PROCEDURE — 80061 LIPID PANEL: CPT

## 2022-08-02 PROCEDURE — 83036 HEMOGLOBIN GLYCOSYLATED A1C: CPT

## 2022-08-02 PROCEDURE — 85027 COMPLETE CBC AUTOMATED: CPT

## 2022-08-02 PROCEDURE — 80053 COMPREHEN METABOLIC PANEL: CPT

## 2022-08-02 PROCEDURE — 36415 COLL VENOUS BLD VENIPUNCTURE: CPT

## 2022-08-09 ENCOUNTER — OFFICE VISIT (OUTPATIENT)
Dept: FAMILY MEDICINE CLINIC | Facility: CLINIC | Age: 72
End: 2022-08-09

## 2022-08-09 VITALS
HEIGHT: 72 IN | SYSTOLIC BLOOD PRESSURE: 126 MMHG | HEART RATE: 82 BPM | OXYGEN SATURATION: 97 % | DIASTOLIC BLOOD PRESSURE: 62 MMHG | TEMPERATURE: 97.1 F | BODY MASS INDEX: 34.19 KG/M2 | WEIGHT: 252.4 LBS

## 2022-08-09 DIAGNOSIS — E11.65 TYPE 2 DIABETES MELLITUS WITH HYPERGLYCEMIA, WITH LONG-TERM CURRENT USE OF INSULIN: Primary | ICD-10-CM

## 2022-08-09 DIAGNOSIS — N40.0 BENIGN PROSTATIC HYPERPLASIA WITHOUT LOWER URINARY TRACT SYMPTOMS: ICD-10-CM

## 2022-08-09 DIAGNOSIS — Z00.00 MEDICARE ANNUAL WELLNESS VISIT, SUBSEQUENT: ICD-10-CM

## 2022-08-09 DIAGNOSIS — Z79.4 TYPE 2 DIABETES MELLITUS WITH HYPERGLYCEMIA, WITH LONG-TERM CURRENT USE OF INSULIN: Primary | ICD-10-CM

## 2022-08-09 DIAGNOSIS — T78.40XD ALLERGY, SUBSEQUENT ENCOUNTER: ICD-10-CM

## 2022-08-09 DIAGNOSIS — E78.5 HYPERLIPIDEMIA, UNSPECIFIED HYPERLIPIDEMIA TYPE: ICD-10-CM

## 2022-08-09 DIAGNOSIS — M54.12 CERVICAL RADICULOPATHY: ICD-10-CM

## 2022-08-09 DIAGNOSIS — I10 ESSENTIAL HYPERTENSION: ICD-10-CM

## 2022-08-09 DIAGNOSIS — H61.23 BILATERAL IMPACTED CERUMEN: ICD-10-CM

## 2022-08-09 DIAGNOSIS — K21.9 GASTROESOPHAGEAL REFLUX DISEASE, UNSPECIFIED WHETHER ESOPHAGITIS PRESENT: ICD-10-CM

## 2022-08-09 PROCEDURE — 1170F FXNL STATUS ASSESSED: CPT | Performed by: NURSE PRACTITIONER

## 2022-08-09 PROCEDURE — 99212 OFFICE O/P EST SF 10 MIN: CPT | Performed by: NURSE PRACTITIONER

## 2022-08-09 PROCEDURE — 1125F AMNT PAIN NOTED PAIN PRSNT: CPT | Performed by: NURSE PRACTITIONER

## 2022-08-09 PROCEDURE — 69209 REMOVE IMPACTED EAR WAX UNI: CPT | Performed by: NURSE PRACTITIONER

## 2022-08-09 PROCEDURE — 1159F MED LIST DOCD IN RCRD: CPT | Performed by: NURSE PRACTITIONER

## 2022-08-09 PROCEDURE — G0439 PPPS, SUBSEQ VISIT: HCPCS | Performed by: NURSE PRACTITIONER

## 2022-08-09 RX ORDER — OMEPRAZOLE 40 MG/1
40 CAPSULE, DELAYED RELEASE ORAL DAILY
Qty: 90 CAPSULE | Refills: 1 | Status: SHIPPED | OUTPATIENT
Start: 2022-08-09

## 2022-08-09 RX ORDER — BRIMONIDINE TARTRATE 0.15 %
1 DROPS OPHTHALMIC (EYE) 2 TIMES DAILY
COMMUNITY

## 2022-08-09 RX ORDER — ATORVASTATIN CALCIUM 40 MG/1
40 TABLET, FILM COATED ORAL DAILY
Qty: 90 TABLET | Refills: 1 | Status: SHIPPED | OUTPATIENT
Start: 2022-08-09

## 2022-08-09 RX ORDER — FINASTERIDE 5 MG/1
5 TABLET, FILM COATED ORAL DAILY
Qty: 90 TABLET | Refills: 1 | Status: SHIPPED | OUTPATIENT
Start: 2022-08-09

## 2022-08-09 RX ORDER — MELOXICAM 15 MG/1
15 TABLET ORAL DAILY
Qty: 90 TABLET | Refills: 1 | Status: SHIPPED | OUTPATIENT
Start: 2022-08-09

## 2022-08-09 RX ORDER — FEXOFENADINE HCL 180 MG/1
180 TABLET ORAL DAILY
Qty: 90 TABLET | Refills: 1 | Status: SHIPPED | OUTPATIENT
Start: 2022-08-09

## 2022-08-09 RX ORDER — BLOOD SUGAR DIAGNOSTIC
1 STRIP MISCELLANEOUS 3 TIMES DAILY
Qty: 600 EACH | Refills: 1 | Status: SHIPPED | OUTPATIENT
Start: 2022-08-09

## 2022-08-09 RX ORDER — SITAGLIPTIN AND METFORMIN HYDROCHLORIDE 1000; 50 MG/1; MG/1
2 TABLET, FILM COATED, EXTENDED RELEASE ORAL DAILY
Qty: 180 TABLET | Refills: 1 | Status: SHIPPED | OUTPATIENT
Start: 2022-08-09

## 2022-08-09 RX ORDER — MONTELUKAST SODIUM 10 MG/1
10 TABLET ORAL NIGHTLY
Qty: 90 TABLET | Refills: 1 | Status: SHIPPED | OUTPATIENT
Start: 2022-08-09

## 2022-08-09 RX ORDER — AMLODIPINE BESYLATE AND BENAZEPRIL HYDROCHLORIDE 10; 20 MG/1; MG/1
1 CAPSULE ORAL DAILY
Qty: 90 CAPSULE | Refills: 1 | Status: SHIPPED | OUTPATIENT
Start: 2022-08-09

## 2022-08-09 NOTE — PROGRESS NOTES
The ABCs of the Annual Wellness Visit  Subsequent Medicare Wellness Visit    Chief Complaint   Patient presents with   • Diabetes     6 month follow up   • Medicare Wellness-subsequent      Subjective    History of Present Illness:  Taqueria Soto is a 72 y.o. male who presents for a Subsequent Medicare Wellness Visit.  Patient also presents to the office today for 6-month follow-up regarding his diabetes, hypertension hyperlipidemia.  Blood pressure is 126/62.  He denies any chest pain shortness breath palpitations at this time.  I did review his glucose readings from home.  Also reviewed his labs with him.  A1c is 6.8%.  Patient also states that he needs to have his ears checked as he has irrigated on a routine basis.  He does state that he has audiologist states that that his right ear was impacted.  He denies any other concerns or complaints at this time    The following portions of the patient's history were reviewed and   updated as appropriate: past family history, past social history and past surgical history.    Compared to one year ago, the patient feels his physical   health is the same.    Compared to one year ago, the patient feels his mental   health is the same.    Recent Hospitalizations:  He was not admitted to the hospital during the last year.       Current Medical Providers:  Patient Care Team:  Edgar Briscoe APRN as PCP - General (Nurse Practitioner)    Outpatient Medications Prior to Visit   Medication Sig Dispense Refill   • aspirin 81 MG EC tablet Take 1 tablet by mouth Daily. 90 tablet 3   • brimonidine (ALPHAGAN) 0.15 % ophthalmic solution Administer 1 drop to the right eye 2 (Two) Times a Day.     • cyclobenzaprine (FLEXERIL) 10 MG tablet Take 1 tablet by mouth Daily. (Patient taking differently: Take 10 mg by mouth 3 (Three) Times a Day As Needed.) 90 tablet 1   • gabapentin (NEURONTIN) 300 MG capsule Take 1 capsule by mouth 3 (Three) Times a Day. 270 capsule 1   • multivitamins-minerals  (PRESERVISION AREDS 2) capsule capsule Take 1 capsule by mouth Daily. 90 capsule 3   • ofloxacin (OCUFLOX) 0.3 % ophthalmic solution      • tamsulosin (FLOMAX) 0.4 MG capsule 24 hr capsule Take 1 capsule by mouth Daily. 90 capsule 3   • timolol (TIMOPTIC-XR) 0.5 % ophthalmic gel-forming Administer 1 drop to the right eye 2 (Two) Times a Day.     • amLODIPine-benazepril (LOTREL) 10-20 MG per capsule Take 1 capsule by mouth Daily. 90 capsule 1   • atorvastatin (LIPITOR) 40 MG tablet Take 1 tablet by mouth Daily. 90 tablet 1   • fexofenadine (ALLEGRA) 180 MG tablet Take 1 tablet by mouth Daily. 90 tablet 1   • finasteride (PROSCAR) 5 MG tablet Take 1 tablet by mouth Daily. 90 tablet 1   • Insulin Glargine (LANTUS SOLOSTAR) 100 UNIT/ML injection pen Inject 42 Units under the skin into the appropriate area as directed Daily. (Patient taking differently: Inject 46 Units under the skin into the appropriate area as directed Daily.) 12 pen 1   • meloxicam (MOBIC) 15 MG tablet Take 1 tablet by mouth Daily. 90 tablet 1   • montelukast (SINGULAIR) 10 MG tablet Take 1 tablet by mouth Every Night. 90 tablet 1   • omeprazole (priLOSEC) 40 MG capsule Take 1 capsule by mouth Daily. 90 capsule 1   • PRECISION XTRA TEST STRIPS test strip 1 each by Other route As Needed (bs). (Patient taking differently: 1 each by Other route 3 (Three) Times a Day.) 100 each 2   • SITagliptin-metFORMIN HCl ER (Janumet XR)  MG tablet Take 2 tablets by mouth Daily. 180 tablet 1     No facility-administered medications prior to visit.       No opioid medication identified on active medication list. I have reviewed chart for other potential  high risk medication/s and harmful drug interactions in the elderly.          Aspirin is on active medication list. Aspirin use is indicated based on review of current medical condition/s. Pros and cons of this therapy have been discussed today. Benefits of this medication outweigh potential harm.  Patient has  "been encouraged to continue taking this medication.  .      Patient Active Problem List   Diagnosis   • Type 1 diabetes mellitus (HCC)   • Hyperlipidemia   • Essential hypertension   • Benign prostatic hyperplasia   • Cervical radiculopathy   • Diabetes mellitus without complication (HCC)   • High blood pressure   • Chronic obstructive pulmonary disease (HCC)   • Hypertensive heart disease without congestive heart failure   • Hemorrhoids   • Gastroesophageal reflux disease   • DDD (degenerative disc disease), cervical   • Cervical spondylosis without myelopathy     Advance Care Planning  Advance Directive is not on file.  ACP discussion was held with the patient during this visit. Patient does not have an advance directive, declines further assistance.          Objective    Vitals:    08/09/22 1054   BP: 126/62   BP Location: Right arm   Patient Position: Sitting   Cuff Size: Adult   Pulse: 82   Temp: 97.1 °F (36.2 °C)   TempSrc: Temporal   SpO2: 97%   Weight: 114 kg (252 lb 6.4 oz)   Height: 181.6 cm (71.5\")   PainSc:   4     Estimated body mass index is 34.71 kg/m² as calculated from the following:    Height as of this encounter: 181.6 cm (71.5\").    Weight as of this encounter: 114 kg (252 lb 6.4 oz).    BMI is >= 30 and <35. (Class 1 Obesity). The following options were offered after discussion;: nutrition counseling/recommendations      Does the patient have evidence of cognitive impairment? No    Physical Exam  Vitals reviewed.   Constitutional:       Appearance: Normal appearance.   HENT:      Right Ear: External ear normal. There is impacted cerumen.      Left Ear: External ear normal.   Cardiovascular:      Rate and Rhythm: Normal rate and regular rhythm.      Pulses: Normal pulses.      Heart sounds: Normal heart sounds, S1 normal and S2 normal. No murmur heard.  Pulmonary:      Effort: Pulmonary effort is normal. No respiratory distress.      Breath sounds: Normal breath sounds.   Skin:     General: Skin " is warm and dry.   Neurological:      Mental Status: He is alert and oriented to person, place, and time.   Psychiatric:         Attention and Perception: Attention normal.         Mood and Affect: Mood normal.         Behavior: Behavior normal.       Lab Results   Component Value Date    TRIG 109 08/02/2022    HDL 39 (L) 08/02/2022    LDL 65 08/02/2022    VLDL 20 08/02/2022    HGBA1C 6.30 (H) 08/02/2022            HEALTH RISK ASSESSMENT    Smoking Status:  Social History     Tobacco Use   Smoking Status Former Smoker   Smokeless Tobacco Never Used     Alcohol Consumption:  Social History     Substance and Sexual Activity   Alcohol Use Yes    Comment: occas     Fall Risk Screen:    WHIT Fall Risk Assessment was completed, and patient is at LOW risk for falls.Assessment completed on:8/9/2022    Depression Screening:  PHQ-2/PHQ-9 Depression Screening 8/9/2022   Retired PHQ-9 Total Score -   Retired Total Score -   Little Interest or Pleasure in Doing Things 0-->not at all   Feeling Down, Depressed or Hopeless 0-->not at all   PHQ-9: Brief Depression Severity Measure Score 0       Health Habits and Functional and Cognitive Screening:  Functional & Cognitive Status 8/9/2022   Do you have difficulty preparing food and eating? No   Do you have difficulty bathing yourself, getting dressed or grooming yourself? No   Do you have difficulty using the toilet? No   Do you have difficulty moving around from place to place? No   Do you have trouble with steps or getting out of a bed or a chair? No   Current Diet Well Balanced Diet   Dental Exam Up to date   Eye Exam Up to date   Exercise (times per week) 5 times per week   Current Exercises Include Walking;Yard Work   Do you need help using the phone?  No   Are you deaf or do you have serious difficulty hearing?  Yes   Do you need help with transportation? Yes   Do you need help shopping? No   Do you need help preparing meals?  No   Do you need help with housework?  No   Do you  need help with laundry? No   Do you need help taking your medications? No   Do you need help managing money? No   Do you ever drive or ride in a car without wearing a seat belt? No   Have you felt unusual stress, anger or loneliness in the last month? No   Who do you live with? Spouse   If you need help, do you have trouble finding someone available to you? No   Have you been bothered in the last four weeks by sexual problems? No   Do you have difficulty concentrating, remembering or making decisions? No       Age-appropriate Screening Schedule:  Refer to the list below for future screening recommendations based on patient's age, sex and/or medical conditions. Orders for these recommended tests are listed in the plan section. The patient has been provided with a written plan.    Health Maintenance   Topic Date Due   • URINE MICROALBUMIN  07/20/2022   • TDAP/TD VACCINES (1 - Tdap) 08/09/2023 (Originally 2/22/1969)   • INFLUENZA VACCINE  10/01/2022   • DIABETIC FOOT EXAM  10/14/2022   • HEMOGLOBIN A1C  02/02/2023   • DIABETIC EYE EXAM  07/13/2023   • LIPID PANEL  08/02/2023   • ZOSTER VACCINE  Completed              Assessment & Plan   CMS Preventative Services Quick Reference  Risk Factors Identified During Encounter  Cardiovascular Disease  Hearing Problem  The above risks/problems have been discussed with the patient.  Follow up actions/plans if indicated are seen below in the Assessment/Plan Section.  Pertinent information has been shared with the patient in the After Visit Summary.    Diagnoses and all orders for this visit:    1. Type 2 diabetes mellitus with hyperglycemia, with long-term current use of insulin (HCC) (Primary)  -     PRECISION XTRA TEST STRIPS test strip; 1 each by Other route 3 (Three) Times a Day.  Dispense: 600 each; Refill: 1  -     Insulin Glargine (LANTUS SOLOSTAR) 100 UNIT/ML injection pen; Inject 46 Units under the skin into the appropriate area as directed Daily.  Dispense: 15 pen;  Refill: 1    2. Benign prostatic hyperplasia without lower urinary tract symptoms  -     finasteride (PROSCAR) 5 MG tablet; Take 1 tablet by mouth Daily.  Dispense: 90 tablet; Refill: 1    3. Hyperlipidemia, unspecified hyperlipidemia type  -     atorvastatin (LIPITOR) 40 MG tablet; Take 1 tablet by mouth Daily.  Dispense: 90 tablet; Refill: 1    4. Allergy, subsequent encounter  -     fexofenadine (ALLEGRA) 180 MG tablet; Take 1 tablet by mouth Daily.  Dispense: 90 tablet; Refill: 1  -     montelukast (SINGULAIR) 10 MG tablet; Take 1 tablet by mouth Every Night.  Dispense: 90 tablet; Refill: 1    5. Gastroesophageal reflux disease, unspecified whether esophagitis present  -     omeprazole (priLOSEC) 40 MG capsule; Take 1 capsule by mouth Daily.  Dispense: 90 capsule; Refill: 1  -     SITagliptin-metFORMIN HCl ER (Janumet XR)  MG tablet; Take 2 tablets by mouth Daily.  Dispense: 180 tablet; Refill: 1    6. Essential hypertension  -     amLODIPine-benazepril (LOTREL) 10-20 MG per capsule; Take 1 capsule by mouth Daily.  Dispense: 90 capsule; Refill: 1    7. Cervical radiculopathy  -     meloxicam (MOBIC) 15 MG tablet; Take 1 tablet by mouth Daily.  Dispense: 90 tablet; Refill: 1    8. Medicare annual wellness visit, subsequent    9. Bilateral impacted cerumen    Bilateral ears irrigated with warm water.  Cerumen removed.  Patient tolerated well.  TMs are without perforation edema or erythema    Follow Up:   Return in about 6 months (around 2/9/2023) for Recheck.     An After Visit Summary and PPPS were made available to the patient.

## 2022-11-08 ENCOUNTER — OFFICE VISIT (OUTPATIENT)
Dept: FAMILY MEDICINE CLINIC | Facility: CLINIC | Age: 72
End: 2022-11-08

## 2022-11-08 VITALS
TEMPERATURE: 97.6 F | HEIGHT: 71 IN | OXYGEN SATURATION: 98 % | HEART RATE: 77 BPM | BODY MASS INDEX: 35.98 KG/M2 | DIASTOLIC BLOOD PRESSURE: 58 MMHG | SYSTOLIC BLOOD PRESSURE: 118 MMHG | WEIGHT: 257 LBS

## 2022-11-08 DIAGNOSIS — H61.22 IMPACTED CERUMEN OF LEFT EAR: Primary | ICD-10-CM

## 2022-11-08 PROCEDURE — 99213 OFFICE O/P EST LOW 20 MIN: CPT | Performed by: NURSE PRACTITIONER

## 2022-11-08 PROCEDURE — 69209 REMOVE IMPACTED EAR WAX UNI: CPT | Performed by: NURSE PRACTITIONER

## 2022-11-08 RX ORDER — TIMOLOL MALEATE 5 MG/ML
SOLUTION/ DROPS OPHTHALMIC
COMMUNITY
Start: 2022-09-27

## 2022-11-08 NOTE — PROGRESS NOTES
"Chief Complaint  Earache (Follow up urgent care/)    Subjective         Taqueria Soto presents to [facility]  Patient presents to the office today for follow-up regarding recent urgent care visit.  Patient was seen and diagnosed with bilateral ear infections.  He is still currently on he has amoxicillin.  Patient states that he does have frequent ear infections because of water that gets into his ears.  He states that he gets york in his ears that he then places his hearing aids.  I did discuss this with the patient explaining the moisture in his ear as aiding in the growth of bacteria.  I explained that we need to keep his ears dry inside.  I did encourage him to get over-the-counter swimmers ear to help remove the water from his ear.    Earache          Objective     Vitals:    11/08/22 1043   BP: 118/58   BP Location: Right arm   Patient Position: Sitting   Cuff Size: Adult   Pulse: 77   Temp: 97.6 °F (36.4 °C)   TempSrc: Temporal   SpO2: 98%   Weight: 117 kg (257 lb)   Height: 180.3 cm (71\")      Body mass index is 35.84 kg/m².    Class 2 Severe Obesity (BMI >=35 and <=39.9). Obesity-related health conditions include the following: diabetes mellitus. Obesity is unchanged. BMI is is above average; no BMI management plan is appropriate. We discussed portion control and increasing exercise.        Physical Exam  Vitals reviewed.   Constitutional:       Appearance: Normal appearance.   HENT:      Right Ear: Tympanic membrane, ear canal and external ear normal.      Left Ear: Tympanic membrane, ear canal and external ear normal. There is impacted cerumen.   Cardiovascular:      Rate and Rhythm: Normal rate and regular rhythm.      Pulses: Normal pulses.      Heart sounds: Normal heart sounds, S1 normal and S2 normal. No murmur heard.  Pulmonary:      Effort: Pulmonary effort is normal. No respiratory distress.      Breath sounds: Normal breath sounds.   Skin:     General: Skin is " warm and dry.   Neurological:      Mental Status: He is alert and oriented to person, place, and time.   Psychiatric:         Attention and Perception: Attention normal.         Mood and Affect: Mood normal.         Behavior: Behavior normal.          Result Review :   The following data was reviewed by: ENOCH Rose on 11/08/2022:      Ear Cerumen Removal    Date/Time: 11/8/2022 11:35 AM  Performed by: Edgar Briscoe APRN  Authorized by: Edgar Briscoe APRN   Location details: left ear  Patient tolerance: patient tolerated the procedure well with no immediate complications  Comments: After irrigation the TM was without erythema or perforation.  Procedure type: irrigation   Sedation:  Patient sedated: no        I did encourage the patient to continue to ensure that his ear canals were dry before placing his hearing aids in.  Patient verbalized understanding.  I also discussed this with his wife and she verbalized understanding    Assessment and Plan   Diagnoses and all orders for this visit:    1. Impacted cerumen of left ear (Primary)    Other orders  -     Ear Cerumen Removal          Follow Up   No follow-ups on file.  Patient was given instructions and counseling regarding his condition or for health maintenance advice. Please see specific information pulled into the AVS if appropriate.

## 2023-02-03 ENCOUNTER — TELEPHONE (OUTPATIENT)
Dept: FAMILY MEDICINE CLINIC | Facility: CLINIC | Age: 73
End: 2023-02-03
Payer: MEDICARE

## 2023-02-03 DIAGNOSIS — I10 ESSENTIAL HYPERTENSION: ICD-10-CM

## 2023-02-03 DIAGNOSIS — E11.65 TYPE 2 DIABETES MELLITUS WITH HYPERGLYCEMIA, WITH LONG-TERM CURRENT USE OF INSULIN: Primary | ICD-10-CM

## 2023-02-03 DIAGNOSIS — E78.5 HYPERLIPIDEMIA, UNSPECIFIED HYPERLIPIDEMIA TYPE: ICD-10-CM

## 2023-02-03 DIAGNOSIS — Z79.4 TYPE 2 DIABETES MELLITUS WITH HYPERGLYCEMIA, WITH LONG-TERM CURRENT USE OF INSULIN: Primary | ICD-10-CM

## 2023-02-03 NOTE — TELEPHONE ENCOUNTER
Labs needed and ordered. Called Brenda and left message to call back.     [facility] lab orders have been placed.

## 2023-02-03 NOTE — TELEPHONE ENCOUNTER
Patient is scheduled for 2/10/2023. Patients spouse is requesting to know if patient will need blood work done. No active orders.

## 2023-02-06 ENCOUNTER — LAB (OUTPATIENT)
Dept: LAB | Facility: HOSPITAL | Age: 73
End: 2023-02-06
Payer: MEDICARE

## 2023-02-06 DIAGNOSIS — E11.65 TYPE 2 DIABETES MELLITUS WITH HYPERGLYCEMIA, WITH LONG-TERM CURRENT USE OF INSULIN: ICD-10-CM

## 2023-02-06 DIAGNOSIS — E78.5 HYPERLIPIDEMIA, UNSPECIFIED HYPERLIPIDEMIA TYPE: ICD-10-CM

## 2023-02-06 DIAGNOSIS — I10 ESSENTIAL HYPERTENSION: ICD-10-CM

## 2023-02-06 DIAGNOSIS — Z79.4 TYPE 2 DIABETES MELLITUS WITH HYPERGLYCEMIA, WITH LONG-TERM CURRENT USE OF INSULIN: ICD-10-CM

## 2023-02-06 LAB
ALBUMIN SERPL-MCNC: 4.5 G/DL (ref 3.5–5.2)
ALBUMIN UR-MCNC: <1.2 MG/DL
ALBUMIN/GLOB SERPL: 1.6 G/DL
ALP SERPL-CCNC: 74 U/L (ref 39–117)
ALT SERPL W P-5'-P-CCNC: 16 U/L (ref 1–41)
ANION GAP SERPL CALCULATED.3IONS-SCNC: 9.7 MMOL/L (ref 5–15)
AST SERPL-CCNC: 24 U/L (ref 1–40)
BASOPHILS # BLD AUTO: 0.07 10*3/MM3 (ref 0–0.2)
BASOPHILS NFR BLD AUTO: 1 % (ref 0–1.5)
BILIRUB SERPL-MCNC: 0.4 MG/DL (ref 0–1.2)
BUN SERPL-MCNC: 15 MG/DL (ref 8–23)
BUN/CREAT SERPL: 15.8 (ref 7–25)
CALCIUM SPEC-SCNC: 9.5 MG/DL (ref 8.6–10.5)
CHLORIDE SERPL-SCNC: 104 MMOL/L (ref 98–107)
CHOLEST SERPL-MCNC: 126 MG/DL (ref 0–200)
CO2 SERPL-SCNC: 24.3 MMOL/L (ref 22–29)
CREAT SERPL-MCNC: 0.95 MG/DL (ref 0.76–1.27)
DEPRECATED RDW RBC AUTO: 41.8 FL (ref 37–54)
EGFRCR SERPLBLD CKD-EPI 2021: 84 ML/MIN/1.73
EOSINOPHIL # BLD AUTO: 0.26 10*3/MM3 (ref 0–0.4)
EOSINOPHIL NFR BLD AUTO: 3.8 % (ref 0.3–6.2)
ERYTHROCYTE [DISTWIDTH] IN BLOOD BY AUTOMATED COUNT: 13.5 % (ref 12.3–15.4)
GLOBULIN UR ELPH-MCNC: 2.8 GM/DL
GLUCOSE SERPL-MCNC: 105 MG/DL (ref 65–99)
HBA1C MFR BLD: 6 % (ref 4.8–5.6)
HCT VFR BLD AUTO: 41.3 % (ref 37.5–51)
HDLC SERPL-MCNC: 44 MG/DL (ref 40–60)
HGB BLD-MCNC: 13.7 G/DL (ref 13–17.7)
IMM GRANULOCYTES # BLD AUTO: 0.02 10*3/MM3 (ref 0–0.05)
IMM GRANULOCYTES NFR BLD AUTO: 0.3 % (ref 0–0.5)
LDLC SERPL CALC-MCNC: 66 MG/DL (ref 0–100)
LDLC/HDLC SERPL: 1.49 {RATIO}
LYMPHOCYTES # BLD AUTO: 1.39 10*3/MM3 (ref 0.7–3.1)
LYMPHOCYTES NFR BLD AUTO: 20.6 % (ref 19.6–45.3)
MCH RBC QN AUTO: 27.8 PG (ref 26.6–33)
MCHC RBC AUTO-ENTMCNC: 33.2 G/DL (ref 31.5–35.7)
MCV RBC AUTO: 83.9 FL (ref 79–97)
MONOCYTES # BLD AUTO: 0.9 10*3/MM3 (ref 0.1–0.9)
MONOCYTES NFR BLD AUTO: 13.3 % (ref 5–12)
NEUTROPHILS NFR BLD AUTO: 4.12 10*3/MM3 (ref 1.7–7)
NEUTROPHILS NFR BLD AUTO: 61 % (ref 42.7–76)
NRBC BLD AUTO-RTO: 0 /100 WBC (ref 0–0.2)
PLATELET # BLD AUTO: 210 10*3/MM3 (ref 140–450)
PMV BLD AUTO: 12.1 FL (ref 6–12)
POTASSIUM SERPL-SCNC: 4.2 MMOL/L (ref 3.5–5.2)
PROT SERPL-MCNC: 7.3 G/DL (ref 6–8.5)
RBC # BLD AUTO: 4.92 10*6/MM3 (ref 4.14–5.8)
SODIUM SERPL-SCNC: 138 MMOL/L (ref 136–145)
TRIGL SERPL-MCNC: 83 MG/DL (ref 0–150)
VLDLC SERPL-MCNC: 16 MG/DL (ref 5–40)
WBC NRBC COR # BLD: 6.76 10*3/MM3 (ref 3.4–10.8)

## 2023-02-06 PROCEDURE — 82043 UR ALBUMIN QUANTITATIVE: CPT

## 2023-02-06 PROCEDURE — 80061 LIPID PANEL: CPT

## 2023-02-06 PROCEDURE — 80053 COMPREHEN METABOLIC PANEL: CPT

## 2023-02-06 PROCEDURE — 36415 COLL VENOUS BLD VENIPUNCTURE: CPT

## 2023-02-06 PROCEDURE — 83036 HEMOGLOBIN GLYCOSYLATED A1C: CPT

## 2023-02-06 PROCEDURE — 85025 COMPLETE CBC W/AUTO DIFF WBC: CPT

## 2023-02-10 ENCOUNTER — OFFICE VISIT (OUTPATIENT)
Dept: FAMILY MEDICINE CLINIC | Facility: CLINIC | Age: 73
End: 2023-02-10
Payer: MEDICARE

## 2023-02-10 VITALS
TEMPERATURE: 96.8 F | BODY MASS INDEX: 35.36 KG/M2 | WEIGHT: 252.6 LBS | SYSTOLIC BLOOD PRESSURE: 134 MMHG | OXYGEN SATURATION: 97 % | HEIGHT: 71 IN | DIASTOLIC BLOOD PRESSURE: 58 MMHG | HEART RATE: 76 BPM

## 2023-02-10 DIAGNOSIS — E11.65 TYPE 2 DIABETES MELLITUS WITH HYPERGLYCEMIA, WITH LONG-TERM CURRENT USE OF INSULIN: ICD-10-CM

## 2023-02-10 DIAGNOSIS — Z79.4 TYPE 2 DIABETES MELLITUS WITH HYPERGLYCEMIA, WITH LONG-TERM CURRENT USE OF INSULIN: ICD-10-CM

## 2023-02-10 DIAGNOSIS — L30.9 DERMATITIS: Primary | ICD-10-CM

## 2023-02-10 DIAGNOSIS — H61.21 IMPACTED CERUMEN OF RIGHT EAR: ICD-10-CM

## 2023-02-10 PROCEDURE — 69209 REMOVE IMPACTED EAR WAX UNI: CPT | Performed by: NURSE PRACTITIONER

## 2023-02-10 PROCEDURE — 99214 OFFICE O/P EST MOD 30 MIN: CPT | Performed by: NURSE PRACTITIONER

## 2023-02-10 RX ORDER — ASPIRIN 81 MG/1
81 TABLET ORAL DAILY
Qty: 90 TABLET | Refills: 3 | Status: SHIPPED | OUTPATIENT
Start: 2023-02-10

## 2023-02-10 NOTE — PROGRESS NOTES
"Chief Complaint  Diabetes and Hyperglycemia    Subjective         Taqueria Soto presents to [facility]  Patient presents to the office today for 6-month follow-up.  I did review labs with the patient.  Blood pressure is 134/58.  Nuys any chest pain shortness breath palpitations at this time.  He does state that he is needing to have his ears checked as they feel full.  Patient also states that he has a rash on his right arm that is pruritic.  He denies any new soaps detergents or lotions.  He states that some of these areas are scabbed because he has been scratching them due to the itch.  Does state that he is needing his Lantus and aspirin sent to the pharmacy.       Objective     Vitals:    02/10/23 0958   BP: 134/58   BP Location: Right arm   Patient Position: Sitting   Cuff Size: Adult   Pulse: 76   Temp: 96.8 °F (36 °C)   TempSrc: Temporal   SpO2: 97%   Weight: 115 kg (252 lb 9.6 oz)   Height: 180.3 cm (71\")      Body mass index is 35.23 kg/m².    Class 2 Severe Obesity (BMI >=35 and <=39.9). Obesity-related health conditions include the following: hypertension, diabetes mellitus and dyslipidemias. Obesity is unchanged. BMI is is above average; BMI management plan is completed. We discussed portion control and increasing exercise.        Physical Exam  Vitals reviewed.   Constitutional:       Appearance: Normal appearance.   HENT:      Right Ear: External ear normal. There is impacted cerumen.      Left Ear: Tympanic membrane, ear canal and external ear normal.   Cardiovascular:      Rate and Rhythm: Normal rate and regular rhythm.      Pulses: Normal pulses.      Heart sounds: Normal heart sounds, S1 normal and S2 normal. No murmur heard.  Pulmonary:      Effort: Pulmonary effort is normal. No respiratory distress.      Breath sounds: Normal breath sounds.   Skin:     General: Skin is warm and dry.   Neurological:      Mental Status: He is alert and oriented to person, place, " and time.   Psychiatric:         Attention and Perception: Attention normal.         Mood and Affect: Mood normal.         Behavior: Behavior normal.          Result Review :   The following data was reviewed by: ENOCH Rose on 02/10/2023:      Ear Cerumen Removal    Date/Time: 2/10/2023 10:35 AM  Performed by: Edgar Briscoe APRN  Authorized by: Edgar Briscoe APRN     Anesthesia:  Local Anesthetic: none  Location details: right ear  Patient tolerance: patient tolerated the procedure well with no immediate complications  Comments: After irrigation the TM was free of erythema or perforation.  Procedure type: irrigation   Sedation:  Patient sedated: no            Assessment and Plan   Diagnoses and all orders for this visit:    1. Dermatitis (Primary)  -     triamcinolone (KENALOG) 0.1 % ointment; Apply 1 application topically to the appropriate area as directed 2 (Two) Times a Day.  Dispense: 45 g; Refill: 0    2. Type 2 diabetes mellitus with hyperglycemia, with long-term current use of insulin (HCC)  -     Insulin Glargine (LANTUS SOLOSTAR) 100 UNIT/ML injection pen; Inject 46 Units under the skin into the appropriate area as directed Daily.  Dispense: 15 mL; Refill: 3    3. Impacted cerumen of right ear  -     Ear Cerumen Removal    Other orders  -     aspirin 81 MG EC tablet; Take 1 tablet by mouth Daily.  Dispense: 90 tablet; Refill: 3          Follow Up   Return in about 6 months (around 8/10/2023) for Recheck.  Patient was given instructions and counseling regarding his condition or for health maintenance advice. Please see specific information pulled into the AVS if appropriate.

## 2023-03-16 ENCOUNTER — TRANSCRIBE ORDERS (OUTPATIENT)
Dept: PULMONOLOGY | Facility: HOSPITAL | Age: 73
End: 2023-03-16
Payer: MEDICARE

## 2023-03-16 ENCOUNTER — HOSPITAL ENCOUNTER (OUTPATIENT)
Dept: GENERAL RADIOLOGY | Facility: HOSPITAL | Age: 73
Discharge: HOME | End: 2023-03-16
Admitting: INTERNAL MEDICINE
Payer: MEDICARE

## 2023-03-16 DIAGNOSIS — J44.9 CHRONIC OBSTRUCTIVE PULMONARY DISEASE, UNSPECIFIED COPD TYPE: ICD-10-CM

## 2023-03-16 DIAGNOSIS — J44.9 CHRONIC OBSTRUCTIVE PULMONARY DISEASE, UNSPECIFIED COPD TYPE: Primary | ICD-10-CM

## 2023-03-16 PROCEDURE — 71046 X-RAY EXAM CHEST 2 VIEWS: CPT

## 2023-05-01 DIAGNOSIS — N40.0 BENIGN PROSTATIC HYPERPLASIA WITHOUT LOWER URINARY TRACT SYMPTOMS: ICD-10-CM

## 2023-05-01 RX ORDER — FINASTERIDE 5 MG/1
5 TABLET, FILM COATED ORAL DAILY
Qty: 90 TABLET | Refills: 1 | Status: SHIPPED | OUTPATIENT
Start: 2023-05-01

## 2023-05-15 DIAGNOSIS — K21.9 GASTROESOPHAGEAL REFLUX DISEASE, UNSPECIFIED WHETHER ESOPHAGITIS PRESENT: ICD-10-CM

## 2023-05-15 DIAGNOSIS — I10 ESSENTIAL HYPERTENSION: ICD-10-CM

## 2023-05-15 DIAGNOSIS — N40.0 BENIGN PROSTATIC HYPERPLASIA WITHOUT LOWER URINARY TRACT SYMPTOMS: ICD-10-CM

## 2023-05-15 DIAGNOSIS — E78.5 HYPERLIPIDEMIA, UNSPECIFIED HYPERLIPIDEMIA TYPE: ICD-10-CM

## 2023-05-15 DIAGNOSIS — Z79.4 TYPE 2 DIABETES MELLITUS WITH HYPERGLYCEMIA, WITH LONG-TERM CURRENT USE OF INSULIN: ICD-10-CM

## 2023-05-15 DIAGNOSIS — M54.12 CERVICAL RADICULOPATHY: ICD-10-CM

## 2023-05-15 DIAGNOSIS — T78.40XD ALLERGY, SUBSEQUENT ENCOUNTER: ICD-10-CM

## 2023-05-15 DIAGNOSIS — E11.65 TYPE 2 DIABETES MELLITUS WITH HYPERGLYCEMIA, WITH LONG-TERM CURRENT USE OF INSULIN: ICD-10-CM

## 2023-05-15 RX ORDER — CARBOXYMETHYLCELLULOSE SODIUM 10 MG/ML
GEL OPHTHALMIC
COMMUNITY
Start: 2023-04-29

## 2023-05-15 RX ORDER — SITAGLIPTIN AND METFORMIN HYDROCHLORIDE 1000; 50 MG/1; MG/1
2 TABLET, FILM COATED, EXTENDED RELEASE ORAL DAILY
Qty: 180 TABLET | Refills: 1 | Status: SHIPPED | OUTPATIENT
Start: 2023-05-15

## 2023-05-15 RX ORDER — TAMSULOSIN HYDROCHLORIDE 0.4 MG/1
1 CAPSULE ORAL DAILY
Qty: 90 CAPSULE | Refills: 3 | Status: SHIPPED | OUTPATIENT
Start: 2023-05-15

## 2023-05-15 RX ORDER — FEXOFENADINE HCL 180 MG/1
180 TABLET ORAL DAILY
Qty: 90 TABLET | Refills: 1 | Status: SHIPPED | OUTPATIENT
Start: 2023-05-15

## 2023-05-15 RX ORDER — BLOOD SUGAR DIAGNOSTIC
1 STRIP MISCELLANEOUS 3 TIMES DAILY
Qty: 600 EACH | Refills: 1 | Status: SHIPPED | OUTPATIENT
Start: 2023-05-15

## 2023-05-15 RX ORDER — OMEPRAZOLE 40 MG/1
40 CAPSULE, DELAYED RELEASE ORAL DAILY
Qty: 90 CAPSULE | Refills: 1 | Status: SHIPPED | OUTPATIENT
Start: 2023-05-15

## 2023-05-15 RX ORDER — MELOXICAM 15 MG/1
15 TABLET ORAL DAILY
Qty: 90 TABLET | Refills: 1 | Status: SHIPPED | OUTPATIENT
Start: 2023-05-15

## 2023-05-15 RX ORDER — MONTELUKAST SODIUM 10 MG/1
10 TABLET ORAL NIGHTLY
Qty: 90 TABLET | Refills: 1 | Status: SHIPPED | OUTPATIENT
Start: 2023-05-15

## 2023-05-15 RX ORDER — AMLODIPINE BESYLATE AND BENAZEPRIL HYDROCHLORIDE 10; 20 MG/1; MG/1
1 CAPSULE ORAL DAILY
Qty: 90 CAPSULE | Refills: 1 | Status: SHIPPED | OUTPATIENT
Start: 2023-05-15

## 2023-05-15 RX ORDER — ATORVASTATIN CALCIUM 40 MG/1
40 TABLET, FILM COATED ORAL DAILY
Qty: 90 TABLET | Refills: 1 | Status: SHIPPED | OUTPATIENT
Start: 2023-05-15

## 2023-05-15 RX ORDER — ANTIOX #8/OM3/DHA/EPA/LUT/ZEAX 250-2.5 MG
1 CAPSULE ORAL DAILY
Qty: 90 CAPSULE | Refills: 3 | Status: SHIPPED | OUTPATIENT
Start: 2023-05-15

## 2023-05-15 RX ORDER — FINASTERIDE 5 MG/1
5 TABLET, FILM COATED ORAL DAILY
Qty: 90 TABLET | Refills: 1 | Status: SHIPPED | OUTPATIENT
Start: 2023-05-15

## 2023-05-15 RX ORDER — ASPIRIN 81 MG/1
81 TABLET ORAL DAILY
Qty: 90 TABLET | Refills: 3 | Status: SHIPPED | OUTPATIENT
Start: 2023-05-15

## 2023-05-15 NOTE — TELEPHONE ENCOUNTER
Brenda called, on verbal release, pt needs a refill on all of his medications. He is requesting we do 90 days on all of his medications.

## 2023-06-16 ENCOUNTER — OFFICE VISIT (OUTPATIENT)
Dept: ORTHOPEDIC SURGERY | Facility: CLINIC | Age: 73
End: 2023-06-16
Payer: MEDICARE

## 2023-06-16 VITALS
HEIGHT: 71 IN | DIASTOLIC BLOOD PRESSURE: 78 MMHG | WEIGHT: 252 LBS | OXYGEN SATURATION: 97 % | HEART RATE: 66 BPM | BODY MASS INDEX: 35.28 KG/M2 | SYSTOLIC BLOOD PRESSURE: 135 MMHG

## 2023-06-16 DIAGNOSIS — M17.0 PRIMARY OSTEOARTHRITIS OF KNEES, BILATERAL: Primary | ICD-10-CM

## 2023-06-16 NOTE — PROGRESS NOTES
"Chief Complaint  Follow-up and Pain of the Left Knee and Follow-up and Pain of the Right Knee     Subjective      Taqueria Soto presents to [facility] for follow up of bilateral knees. He has had osteo arthritis for years he has treated conservatively with physical therapy and anti inflammatories.  He has discussed injections and is here today for bilateral knee Synvisc injections. His right knee hurts worse then the left.  He has difficulty with prolonged standing, ambulation and stairs.     Allergies   Allergen Reactions    Nitroglycerin Other (See Comments)     Heart rate dropped down  Other reaction(s): Other (See Comments)  Heart rate dropped down        Social History     Socioeconomic History    Marital status:    Tobacco Use    Smoking status: Former    Smokeless tobacco: Never   Vaping Use    Vaping Use: Never used   Substance and Sexual Activity    Alcohol use: Yes     Comment: occas    Drug use: Never    Sexual activity: Defer        Review of Systems     Objective   Vital Signs:   /78   Pulse 66   Ht 179.1 cm (70.5\")   Wt 114 kg (252 lb)   SpO2 97%   BMI 35.65 kg/m²       Physical Exam  Constitutional:       Appearance: Normal appearance. Patient is well-developed and normal weight.   HENT:      Head: Normocephalic.      Right Ear: Hearing and external ear normal.      Left Ear: Hearing and external ear normal.      Nose: Nose normal.   Eyes:      Conjunctiva/sclera: Conjunctivae normal.   Cardiovascular:      Rate and Rhythm: Normal rate.   Pulmonary:      Effort: Pulmonary effort is normal.      Breath sounds: No wheezing or rales.   Abdominal:      Palpations: Abdomen is soft.      Tenderness: There is no abdominal tenderness.   Musculoskeletal:      Cervical back: Normal range of motion.   Skin:     Findings: No rash.   Neurological:      Mental Status: Patient is alert and oriented to person, place, and time.   Psychiatric:         Mood and Affect: " Mood and affect normal.         Judgment: Judgment normal.       Ortho Exam      BILATERAL KNEES Flexion 110. Extension 0. Stable to varus/valgus stress. Stable to anterior/posterior drawer. Neurovascularly intact. Negative Blane. Negative Lachman. Positive EHL, FHL, HS and TA. Sensation intact to light touch all 5 nerves of the foot. Ambulates with Antalgic gait. Patella is well tracking. Calf supple, non-tender. Positive tenderness to the medial joint line. Positive tenderness to the lateral joint line. Positive Crepitus. Good strength to hamstrings, quadriceps, dorsiflexors, and plantar flexors.  Knee Extensor Mechanism intact      Large Joint RIGHT KNEE: R knee  Date/Time: 6/16/2023 10:08 AM  Consent given by: patient  Site marked: site marked  Timeout: Immediately prior to procedure a time out was called to verify the correct patient, procedure, equipment, support staff and site/side marked as required   Supporting Documentation  Indications: pain   Procedure Details  Location: knee - R knee  Preparation: Patient was prepped and draped in the usual sterile fashion  Needle gauge: 21G.  Medications administered: 48 mg hylan 48 MG/6ML  Patient tolerance: patient tolerated the procedure well with no immediate complications      Large Joint LEFT KNEE: L knee  Date/Time: 6/16/2023 10:08 AM  Consent given by: patient  Site marked: site marked  Timeout: Immediately prior to procedure a time out was called to verify the correct patient, procedure, equipment, support staff and site/side marked as required   Supporting Documentation  Indications: pain   Procedure Details  Location: knee - L knee  Preparation: Patient was prepped and draped in the usual sterile fashion  Needle gauge: 21G.  Medications administered: 48 mg hylan 48 MG/6ML  Patient tolerance: patient tolerated the procedure well with no immediate complications        Imaging Results (Most Recent)       None             Result Review :           Assessment  and Plan     Diagnoses and all orders for this visit:    1. Primary osteoarthritis of knees, bilateral (Primary)        Discussed the treatment plan with the patient. Discussed the risks and benefits of conservative measures.      The patient expressed understanding and wished to proceed with bilateral knee Synvisc injections.  He tolerated the injections well.       Call or return if worsening symptoms.    Follow Up     PRN      Patient was given instructions and counseling regarding his condition or for health maintenance advice. Please see specific information pulled into the AVS if appropriate.     Scribed for Emory Henriquez MD by Lalitha Serna MA.  06/16/23   09:59 EDT      I have personally performed the services described in this document as scribed by the above individual and it is both accurate and complete. Emory Henriquez MD 06/16/23

## 2023-08-02 ENCOUNTER — TELEPHONE (OUTPATIENT)
Dept: FAMILY MEDICINE CLINIC | Facility: CLINIC | Age: 73
End: 2023-08-02
Payer: MEDICARE

## 2023-08-02 DIAGNOSIS — E78.5 HYPERLIPIDEMIA, UNSPECIFIED HYPERLIPIDEMIA TYPE: Primary | ICD-10-CM

## 2023-08-02 DIAGNOSIS — I10 ESSENTIAL HYPERTENSION: ICD-10-CM

## 2023-08-02 DIAGNOSIS — Z79.4 TYPE 2 DIABETES MELLITUS WITH HYPERGLYCEMIA, WITH LONG-TERM CURRENT USE OF INSULIN: ICD-10-CM

## 2023-08-02 DIAGNOSIS — E11.65 TYPE 2 DIABETES MELLITUS WITH HYPERGLYCEMIA, WITH LONG-TERM CURRENT USE OF INSULIN: ICD-10-CM

## 2023-08-02 DIAGNOSIS — Z11.59 NEED FOR HEPATITIS C SCREENING TEST: ICD-10-CM

## 2023-08-08 ENCOUNTER — LAB (OUTPATIENT)
Dept: LAB | Facility: HOSPITAL | Age: 73
End: 2023-08-08
Payer: MEDICARE

## 2023-08-08 DIAGNOSIS — I10 ESSENTIAL HYPERTENSION: ICD-10-CM

## 2023-08-08 DIAGNOSIS — Z79.4 TYPE 2 DIABETES MELLITUS WITH HYPERGLYCEMIA, WITH LONG-TERM CURRENT USE OF INSULIN: ICD-10-CM

## 2023-08-08 DIAGNOSIS — E78.5 HYPERLIPIDEMIA, UNSPECIFIED HYPERLIPIDEMIA TYPE: ICD-10-CM

## 2023-08-08 DIAGNOSIS — Z11.59 NEED FOR HEPATITIS C SCREENING TEST: ICD-10-CM

## 2023-08-08 DIAGNOSIS — E11.65 TYPE 2 DIABETES MELLITUS WITH HYPERGLYCEMIA, WITH LONG-TERM CURRENT USE OF INSULIN: ICD-10-CM

## 2023-08-08 LAB
ALBUMIN SERPL-MCNC: 4.3 G/DL (ref 3.5–5.2)
ALBUMIN UR-MCNC: <1.2 MG/DL
ALBUMIN/GLOB SERPL: 1.5 G/DL
ALP SERPL-CCNC: 73 U/L (ref 39–117)
ALT SERPL W P-5'-P-CCNC: 15 U/L (ref 1–41)
ANION GAP SERPL CALCULATED.3IONS-SCNC: 10.4 MMOL/L (ref 5–15)
AST SERPL-CCNC: 22 U/L (ref 1–40)
BILIRUB SERPL-MCNC: 0.5 MG/DL (ref 0–1.2)
BUN SERPL-MCNC: 15 MG/DL (ref 8–23)
BUN/CREAT SERPL: 14.6 (ref 7–25)
CALCIUM SPEC-SCNC: 9.7 MG/DL (ref 8.6–10.5)
CHLORIDE SERPL-SCNC: 107 MMOL/L (ref 98–107)
CHOLEST SERPL-MCNC: 126 MG/DL (ref 0–200)
CO2 SERPL-SCNC: 23.6 MMOL/L (ref 22–29)
CREAT SERPL-MCNC: 1.03 MG/DL (ref 0.76–1.27)
CREAT UR-MCNC: 76 MG/DL
DEPRECATED RDW RBC AUTO: 41.1 FL (ref 37–54)
EGFRCR SERPLBLD CKD-EPI 2021: 75.8 ML/MIN/1.73
ERYTHROCYTE [DISTWIDTH] IN BLOOD BY AUTOMATED COUNT: 13.4 % (ref 12.3–15.4)
GLOBULIN UR ELPH-MCNC: 2.8 GM/DL
GLUCOSE SERPL-MCNC: 110 MG/DL (ref 65–99)
HBA1C MFR BLD: 6.1 % (ref 4.8–5.6)
HCT VFR BLD AUTO: 41.1 % (ref 37.5–51)
HCV AB SER DONR QL: NORMAL
HDLC SERPL-MCNC: 42 MG/DL (ref 40–60)
HGB BLD-MCNC: 13.4 G/DL (ref 13–17.7)
LDLC SERPL CALC-MCNC: 67 MG/DL (ref 0–100)
LDLC/HDLC SERPL: 1.58 {RATIO}
MCH RBC QN AUTO: 27.1 PG (ref 26.6–33)
MCHC RBC AUTO-ENTMCNC: 32.6 G/DL (ref 31.5–35.7)
MCV RBC AUTO: 83.2 FL (ref 79–97)
MICROALBUMIN/CREAT UR: NORMAL MG/G{CREAT}
PLATELET # BLD AUTO: 208 10*3/MM3 (ref 140–450)
PMV BLD AUTO: 12.4 FL (ref 6–12)
POTASSIUM SERPL-SCNC: 4.3 MMOL/L (ref 3.5–5.2)
PROT SERPL-MCNC: 7.1 G/DL (ref 6–8.5)
RBC # BLD AUTO: 4.94 10*6/MM3 (ref 4.14–5.8)
SODIUM SERPL-SCNC: 141 MMOL/L (ref 136–145)
TRIGL SERPL-MCNC: 88 MG/DL (ref 0–150)
TSH SERPL DL<=0.05 MIU/L-ACNC: 3.31 UIU/ML (ref 0.27–4.2)
VLDLC SERPL-MCNC: 17 MG/DL (ref 5–40)
WBC NRBC COR # BLD: 7.06 10*3/MM3 (ref 3.4–10.8)

## 2023-08-08 PROCEDURE — 85027 COMPLETE CBC AUTOMATED: CPT

## 2023-08-08 PROCEDURE — 36415 COLL VENOUS BLD VENIPUNCTURE: CPT

## 2023-08-08 PROCEDURE — 80053 COMPREHEN METABOLIC PANEL: CPT

## 2023-08-08 PROCEDURE — 82570 ASSAY OF URINE CREATININE: CPT

## 2023-08-08 PROCEDURE — 84443 ASSAY THYROID STIM HORMONE: CPT

## 2023-08-08 PROCEDURE — 83036 HEMOGLOBIN GLYCOSYLATED A1C: CPT

## 2023-08-08 PROCEDURE — 86803 HEPATITIS C AB TEST: CPT

## 2023-08-08 PROCEDURE — 80061 LIPID PANEL: CPT

## 2023-08-08 PROCEDURE — 82043 UR ALBUMIN QUANTITATIVE: CPT

## 2023-08-11 ENCOUNTER — OFFICE VISIT (OUTPATIENT)
Dept: FAMILY MEDICINE CLINIC | Facility: CLINIC | Age: 73
End: 2023-08-11
Payer: MEDICARE

## 2023-08-11 VITALS
BODY MASS INDEX: 35.39 KG/M2 | SYSTOLIC BLOOD PRESSURE: 116 MMHG | HEART RATE: 73 BPM | OXYGEN SATURATION: 96 % | DIASTOLIC BLOOD PRESSURE: 64 MMHG | WEIGHT: 252.8 LBS | TEMPERATURE: 97.6 F | HEIGHT: 71 IN

## 2023-08-11 DIAGNOSIS — N40.0 BENIGN PROSTATIC HYPERPLASIA WITHOUT LOWER URINARY TRACT SYMPTOMS: ICD-10-CM

## 2023-08-11 DIAGNOSIS — T78.40XD ALLERGY, SUBSEQUENT ENCOUNTER: ICD-10-CM

## 2023-08-11 DIAGNOSIS — E78.5 HYPERLIPIDEMIA, UNSPECIFIED HYPERLIPIDEMIA TYPE: Primary | ICD-10-CM

## 2023-08-11 DIAGNOSIS — M54.12 CERVICAL RADICULOPATHY: ICD-10-CM

## 2023-08-11 DIAGNOSIS — E11.65 TYPE 2 DIABETES MELLITUS WITH HYPERGLYCEMIA, WITH LONG-TERM CURRENT USE OF INSULIN: ICD-10-CM

## 2023-08-11 DIAGNOSIS — Z79.4 TYPE 2 DIABETES MELLITUS WITH HYPERGLYCEMIA, WITH LONG-TERM CURRENT USE OF INSULIN: ICD-10-CM

## 2023-08-11 DIAGNOSIS — Z00.00 MEDICARE ANNUAL WELLNESS VISIT, SUBSEQUENT: ICD-10-CM

## 2023-08-11 DIAGNOSIS — K21.9 GASTROESOPHAGEAL REFLUX DISEASE, UNSPECIFIED WHETHER ESOPHAGITIS PRESENT: ICD-10-CM

## 2023-08-11 DIAGNOSIS — I10 ESSENTIAL HYPERTENSION: ICD-10-CM

## 2023-08-11 RX ORDER — PEN NEEDLE, DIABETIC 30 GX3/16"
1 NEEDLE, DISPOSABLE MISCELLANEOUS DAILY
Qty: 1000 EACH | Refills: 1 | Status: SHIPPED | OUTPATIENT
Start: 2023-08-11

## 2023-08-11 RX ORDER — OMEPRAZOLE 40 MG/1
40 CAPSULE, DELAYED RELEASE ORAL DAILY
Qty: 90 CAPSULE | Refills: 1 | Status: SHIPPED | OUTPATIENT
Start: 2023-08-11

## 2023-08-11 RX ORDER — ANTIOX #8/OM3/DHA/EPA/LUT/ZEAX 250-2.5 MG
1 CAPSULE ORAL DAILY
Qty: 90 CAPSULE | Refills: 3 | Status: SHIPPED | OUTPATIENT
Start: 2023-08-11

## 2023-08-11 RX ORDER — MONTELUKAST SODIUM 10 MG/1
10 TABLET ORAL NIGHTLY
Qty: 90 TABLET | Refills: 1 | Status: SHIPPED | OUTPATIENT
Start: 2023-08-11

## 2023-08-11 RX ORDER — CYCLOBENZAPRINE HCL 10 MG
10 TABLET ORAL 3 TIMES DAILY PRN
Qty: 270 TABLET | Refills: 1 | Status: SHIPPED | OUTPATIENT
Start: 2023-08-11

## 2023-08-11 RX ORDER — BLOOD SUGAR DIAGNOSTIC
1 STRIP MISCELLANEOUS 3 TIMES DAILY
Qty: 600 EACH | Refills: 1 | Status: SHIPPED | OUTPATIENT
Start: 2023-08-11

## 2023-08-11 RX ORDER — ASPIRIN 81 MG/1
81 TABLET ORAL DAILY
Qty: 90 TABLET | Refills: 3 | Status: SHIPPED | OUTPATIENT
Start: 2023-08-11

## 2023-08-11 RX ORDER — MELOXICAM 15 MG/1
15 TABLET ORAL DAILY
Qty: 90 TABLET | Refills: 1 | Status: SHIPPED | OUTPATIENT
Start: 2023-08-11

## 2023-08-11 RX ORDER — AMLODIPINE BESYLATE AND BENAZEPRIL HYDROCHLORIDE 10; 20 MG/1; MG/1
1 CAPSULE ORAL DAILY
Qty: 90 CAPSULE | Refills: 1 | Status: SHIPPED | OUTPATIENT
Start: 2023-08-11

## 2023-08-11 RX ORDER — PEN NEEDLE, DIABETIC 30 GX3/16"
1 NEEDLE, DISPOSABLE MISCELLANEOUS DAILY
COMMUNITY
End: 2023-08-11 | Stop reason: SDUPTHER

## 2023-08-11 RX ORDER — ATORVASTATIN CALCIUM 40 MG/1
40 TABLET, FILM COATED ORAL DAILY
Qty: 90 TABLET | Refills: 1 | Status: SHIPPED | OUTPATIENT
Start: 2023-08-11

## 2023-08-11 RX ORDER — SITAGLIPTIN AND METFORMIN HYDROCHLORIDE 1000; 50 MG/1; MG/1
2 TABLET, FILM COATED, EXTENDED RELEASE ORAL DAILY
Qty: 180 TABLET | Refills: 1 | Status: SHIPPED | OUTPATIENT
Start: 2023-08-11

## 2023-08-11 RX ORDER — GABAPENTIN 300 MG/1
300 CAPSULE ORAL 3 TIMES DAILY
Qty: 270 CAPSULE | Refills: 1 | Status: SHIPPED | OUTPATIENT
Start: 2023-08-11

## 2023-08-11 RX ORDER — TAMSULOSIN HYDROCHLORIDE 0.4 MG/1
1 CAPSULE ORAL DAILY
Qty: 90 CAPSULE | Refills: 3 | Status: SHIPPED | OUTPATIENT
Start: 2023-08-11

## 2023-08-11 RX ORDER — FEXOFENADINE HCL 180 MG/1
180 TABLET ORAL DAILY
Qty: 90 TABLET | Refills: 1 | Status: SHIPPED | OUTPATIENT
Start: 2023-08-11

## 2023-08-11 RX ORDER — FINASTERIDE 5 MG/1
5 TABLET, FILM COATED ORAL DAILY
Qty: 90 TABLET | Refills: 1 | Status: SHIPPED | OUTPATIENT
Start: 2023-08-11

## 2023-08-11 NOTE — PROGRESS NOTES
The ABCs of the Annual Wellness Visit  Subsequent Medicare Wellness Visit    Subjective    Taqueria Soto is a 73 y.o. male who presents for a Subsequent Medicare Wellness Visit.    The following portions of the patient's history were reviewed and   updated as appropriate: allergies, current medications, past family history, past medical history, past social history, past surgical history, and problem list.    Compared to one year ago, the patient feels his physical   health is the same.    Compared to one year ago, the patient feels his mental   health is the same.    Recent Hospitalizations:  He was not admitted to the hospital during the last year.       Current Medical Providers:  Patient Care Team:  Edgar Briscoe APRN as PCP - General (Nurse Practitioner)    Outpatient Medications Prior to Visit   Medication Sig Dispense Refill    brimonidine (ALPHAGAN) 0.15 % ophthalmic solution Administer 1 drop to the right eye 2 (Two) Times a Day.      Refresh Celluvisc 1 % gel eye gel       timolol (TIMOPTIC) 0.5 % ophthalmic solution       timolol (TIMOPTIC-XR) 0.5 % ophthalmic gel-forming Administer 1 drop to the right eye 2 (Two) Times a Day.      amLODIPine-benazepril (LOTREL) 10-20 MG per capsule Take 1 capsule by mouth Daily. 90 capsule 1    aspirin 81 MG EC tablet Take 1 tablet by mouth Daily. 90 tablet 3    atorvastatin (LIPITOR) 40 MG tablet Take 1 tablet by mouth Daily. 90 tablet 1    cyclobenzaprine (FLEXERIL) 10 MG tablet Take 1 tablet by mouth Daily. (Patient taking differently: Take 1 tablet by mouth 3 (Three) Times a Day As Needed.) 90 tablet 1    fexofenadine (ALLEGRA) 180 MG tablet Take 1 tablet by mouth Daily. 90 tablet 1    finasteride (PROSCAR) 5 MG tablet Take 1 tablet by mouth Daily. 90 tablet 1    gabapentin (NEURONTIN) 300 MG capsule Take 1 capsule by mouth 3 (Three) Times a Day. 270 capsule 1    Insulin Glargine (LANTUS SOLOSTAR) 100 UNIT/ML injection pen Inject 46 Units under the skin into the  appropriate area as directed Daily. 15 mL 3    Insulin Pen Needle (Pen Needles) 31G X 5 MM misc 1 each Daily. DX e11.9 used with Lantus daily      meloxicam (MOBIC) 15 MG tablet Take 1 tablet by mouth Daily. 90 tablet 1    montelukast (SINGULAIR) 10 MG tablet Take 1 tablet by mouth Every Night. 90 tablet 1    multivitamins-minerals (PRESERVISION AREDS 2) capsule capsule Take 1 capsule by mouth Daily. 90 capsule 3    omeprazole (priLOSEC) 40 MG capsule Take 1 capsule by mouth Daily. 90 capsule 1    PRECISION XTRA TEST STRIPS test strip 1 each by Other route 3 (Three) Times a Day. 600 each 1    SITagliptin-metFORMIN HCl ER (Janumet XR)  MG tablet Take 2 tablets by mouth Daily. 180 tablet 1    tamsulosin (FLOMAX) 0.4 MG capsule 24 hr capsule Take 1 capsule by mouth Daily. 90 capsule 3    ofloxacin (OCUFLOX) 0.3 % ophthalmic solution       triamcinolone (KENALOG) 0.1 % ointment Apply 1 application topically to the appropriate area as directed 2 (Two) Times a Day. 45 g 0     No facility-administered medications prior to visit.       No opioid medication identified on active medication list. I have reviewed chart for other potential  high risk medication/s and harmful drug interactions in the elderly.        Aspirin is on active medication list. Aspirin use is indicated based on review of current medical condition/s. Pros and cons of this therapy have been discussed today. Benefits of this medication outweigh potential harm.  Patient has been encouraged to continue taking this medication.  .      Patient Active Problem List   Diagnosis    Type 1 diabetes mellitus    Hyperlipidemia    Essential hypertension    Benign prostatic hyperplasia    Cervical radiculopathy    Diabetes mellitus without complication    High blood pressure    Chronic obstructive pulmonary disease    Hypertensive heart disease without congestive heart failure    Hemorrhoids    Gastroesophageal reflux disease    DDD (degenerative disc disease),  "cervical    Cervical spondylosis without myelopathy     Advance Care Planning   Advance Care Planning     Advance Directive is not on file.  ACP discussion was held with the patient during this visit. Patient does not have an advance directive, declines further assistance.     Objective    Vitals:    23   BP: 116/64   BP Location: Right arm   Patient Position: Sitting   Cuff Size: Adult   Pulse: 73   Temp: 97.6 øF (36.4 øC)   TempSrc: Temporal   SpO2: 96%   Weight: 115 kg (252 lb 12.8 oz)   Height: 180.3 cm (71\")     Estimated body mass index is 35.26 kg/mý as calculated from the following:    Height as of this encounter: 180.3 cm (71\").    Weight as of this encounter: 115 kg (252 lb 12.8 oz).           Does the patient have evidence of cognitive impairment? No    Lab Results   Component Value Date    TRIG 88 2023    HDL 42 2023    LDL 67 2023    VLDL 17 2023    HGBA1C 6.10 (H) 2023        HEALTH RISK ASSESSMENT    Smoking Status:  Social History     Tobacco Use   Smoking Status Former   Smokeless Tobacco Never     Alcohol Consumption:  Social History     Substance and Sexual Activity   Alcohol Use Yes    Comment: occas     Fall Risk Screen:    WHIT Fall Risk Assessment was completed, and patient is at LOW risk for falls.Assessment completed on:2023    Depression Screenin/11/2023     9:00 AM   PHQ-2/PHQ-9 Depression Screening   Little Interest or Pleasure in Doing Things 0-->not at all   Feeling Down, Depressed or Hopeless 0-->not at all   Trouble Falling or Staying Asleep, or Sleeping Too Much 0-->not at all   Feeling Tired or Having Little Energy 0-->not at all   Poor Appetite or Overeating 0-->not at all   Feeling Bad about Yourself - or that You are a Failure or Have Let Yourself or Your Family Down 0-->not at all   Trouble Concentrating on Things, Such as Reading the Newspaper or Watching Television 0-->not at all   Moving or Speaking So Slowly that Other " People Could Have Noticed? Or the Opposite - Being So Fidgety 0-->not at all   Thoughts that You Would be Better Off Dead or of Hurting Yourself in Some Way 0-->not at all   PHQ-9: Brief Depression Severity Measure Score 0   If You Checked Off Any Problems, How Difficult Have These Problems Made It For You to Do Your Work, Take Care of Things at Home, or Get Along with Other People? not difficult at all       Health Habits and Functional and Cognitive Screenin/11/2023     9:00 AM   Functional & Cognitive Status   Do you have difficulty preparing food and eating? No   Do you have difficulty bathing yourself, getting dressed or grooming yourself? No   Do you have difficulty using the toilet? No   Do you have difficulty moving around from place to place? No   Do you have trouble with steps or getting out of a bed or a chair? No   Current Diet Well Balanced Diet   Dental Exam Not up to date   Eye Exam Up to date   Exercise (times per week) 5 times per week   Current Exercises Include Walking;Yard Work   Do you need help using the phone?  No   Are you deaf or do you have serious difficulty hearing?  Yes   Do you need help to go to places out of walking distance? Yes   Do you need help shopping? No   Do you need help preparing meals?  No   Do you need help with housework?  No   Do you need help with laundry? No   Do you need help taking your medications? No   Do you need help managing money? No   Do you ever drive or ride in a car without wearing a seat belt? No   Have you felt unusual stress, anger or loneliness in the last month? No   Who do you live with? Spouse   If you need help, do you have trouble finding someone available to you? No   Have you been bothered in the last four weeks by sexual problems? No   Do you have difficulty concentrating, remembering or making decisions? No       Age-appropriate Screening Schedule:  Refer to the list below for future screening recommendations based on patient's age,  sex and/or medical conditions. Orders for these recommended tests are listed in the plan section. The patient has been provided with a written plan.    Health Maintenance   Topic Date Due    AAA SCREEN (ONE-TIME)  Never done    DIABETIC FOOT EXAM  10/14/2022    COVID-19 Vaccine (6 - Moderna series) 08/11/2024 (Originally 1/27/2023)    INFLUENZA VACCINE  10/01/2023    HEMOGLOBIN A1C  02/08/2024    DIABETIC EYE EXAM  05/19/2024    LIPID PANEL  08/08/2024    URINE MICROALBUMIN  08/08/2024    ANNUAL WELLNESS VISIT  08/11/2024    COLORECTAL CANCER SCREENING  03/31/2031    TDAP/TD VACCINES (2 - Td or Tdap) 08/11/2031    HEPATITIS C SCREENING  Completed    Pneumococcal Vaccine 65+  Completed    ZOSTER VACCINE  Completed                  CMS Preventative Services Quick Reference  Risk Factors Identified During Encounter  None Identified  The above risks/problems have been discussed with the patient.  Pertinent information has been shared with the patient in the After Visit Summary.  An After Visit Summary and PPPS were made available to the patient.    Follow Up:   Next Medicare Wellness visit to be scheduled in 1 year.       Additional E&M Note during same encounter follows:  Patient has multiple medical problems which are significant and separately identifiable that require additional work above and beyond the Medicare Wellness Visit.      Chief Complaint  Diabetes (6 month follow up )    Subjective        Diabetes    Taqueria Soto is also being seen today for Presents to the office today for 6-month follow-up.  I did review recent lab work with the patient including his A1c of 6.1%.  Blood pressure is 116/64.  Denies any chest pain shortness breath palpitations at this time.  Patient does state that he is needing refills of all of his medications.  He does request paper prescriptions.  He denies any other concerns or complaints at this time.         Objective   Vital Signs:  /64 (BP Location: Right arm, Patient  "Position: Sitting, Cuff Size: Adult)   Pulse 73   Temp 97.6 øF (36.4 øC) (Temporal)   Ht 180.3 cm (71\")   Wt 115 kg (252 lb 12.8 oz)   SpO2 96%   BMI 35.26 kg/mý     Physical Exam  Vitals reviewed.   Constitutional:       Appearance: Normal appearance.   HENT:      Right Ear: Tympanic membrane, ear canal and external ear normal.      Left Ear: Tympanic membrane, ear canal and external ear normal.   Cardiovascular:      Rate and Rhythm: Normal rate and regular rhythm.      Pulses: Normal pulses.      Heart sounds: Normal heart sounds, S1 normal and S2 normal. No murmur heard.  Pulmonary:      Effort: Pulmonary effort is normal. No respiratory distress.      Breath sounds: Normal breath sounds.   Skin:     General: Skin is warm and dry.   Neurological:      Mental Status: He is alert and oriented to person, place, and time.   Psychiatric:         Attention and Perception: Attention normal.         Mood and Affect: Mood normal.         Behavior: Behavior normal.                     Assessment and Plan   Diagnoses and all orders for this visit:    1. Hyperlipidemia, unspecified hyperlipidemia type (Primary)  -     CBC (No Diff); Future  -     Comprehensive Metabolic Panel; Future  -     Lipid Panel; Future  -     atorvastatin (LIPITOR) 40 MG tablet; Take 1 tablet by mouth Daily.  Dispense: 90 tablet; Refill: 1    2. Essential hypertension  -     CBC (No Diff); Future  -     Comprehensive Metabolic Panel; Future  -     Lipid Panel; Future  -     amLODIPine-benazepril (LOTREL) 10-20 MG per capsule; Take 1 capsule by mouth Daily.  Dispense: 90 capsule; Refill: 1    3. Type 2 diabetes mellitus with hyperglycemia, with long-term current use of insulin  -     CBC (No Diff); Future  -     Comprehensive Metabolic Panel; Future  -     Hemoglobin A1c; Future  -     Lipid Panel; Future  -     Microalbumin / Creatinine Urine Ratio - Urine, Clean Catch; Future  -     TSH; Future  -     Insulin Glargine (LANTUS SOLOSTAR) 100 " UNIT/ML injection pen; Inject 46 Units under the skin into the appropriate area as directed Daily.  Dispense: 15 mL; Refill: 3  -     Insulin Pen Needle (Pen Needles) 31G X 5 MM misc; 1 each Daily. DX e11.9 used with Lantus daily  Dispense: 1000 each; Refill: 1  -     PRECISION XTRA TEST STRIPS test strip; 1 each by Other route 3 (Three) Times a Day.  Dispense: 600 each; Refill: 1    4. Cervical radiculopathy  -     cyclobenzaprine (FLEXERIL) 10 MG tablet; Take 1 tablet by mouth 3 (Three) Times a Day As Needed for Muscle Spasms.  Dispense: 270 tablet; Refill: 1  -     gabapentin (NEURONTIN) 300 MG capsule; Take 1 capsule by mouth 3 (Three) Times a Day.  Dispense: 270 capsule; Refill: 1  -     meloxicam (MOBIC) 15 MG tablet; Take 1 tablet by mouth Daily.  Dispense: 90 tablet; Refill: 1    5. Allergy, subsequent encounter  -     fexofenadine (ALLEGRA) 180 MG tablet; Take 1 tablet by mouth Daily.  Dispense: 90 tablet; Refill: 1  -     montelukast (SINGULAIR) 10 MG tablet; Take 1 tablet by mouth Every Night.  Dispense: 90 tablet; Refill: 1    6. Benign prostatic hyperplasia without lower urinary tract symptoms  -     finasteride (PROSCAR) 5 MG tablet; Take 1 tablet by mouth Daily.  Dispense: 90 tablet; Refill: 1  -     tamsulosin (FLOMAX) 0.4 MG capsule 24 hr capsule; Take 1 capsule by mouth Daily.  Dispense: 90 capsule; Refill: 3    7. Gastroesophageal reflux disease, unspecified whether esophagitis present  -     omeprazole (priLOSEC) 40 MG capsule; Take 1 capsule by mouth Daily.  Dispense: 90 capsule; Refill: 1  -     SITagliptin-metFORMIN HCl ER (Janumet XR)  MG tablet; Take 2 tablets by mouth Daily.  Dispense: 180 tablet; Refill: 1    8. Medicare annual wellness visit, subsequent    Other orders  -     aspirin 81 MG EC tablet; Take 1 tablet by mouth Daily.  Dispense: 90 tablet; Refill: 3  -     multivitamins-minerals (PRESERVISION AREDS 2) capsule capsule; Take 1 capsule by mouth Daily.  Dispense: 90  capsule; Refill: 3             Follow Up   Return in about 6 months (around 2/11/2024) for Recheck.  Patient was given instructions and counseling regarding his condition or for health maintenance advice. Please see specific information pulled into the AVS if appropriate.

## 2023-09-05 ENCOUNTER — OFFICE VISIT (OUTPATIENT)
Dept: SLEEP MEDICINE | Facility: HOSPITAL | Age: 73
End: 2023-09-05
Payer: MEDICARE

## 2023-09-05 VITALS — OXYGEN SATURATION: 97 % | BODY MASS INDEX: 35.19 KG/M2 | HEART RATE: 43 BPM | HEIGHT: 71 IN | WEIGHT: 251.4 LBS

## 2023-09-05 DIAGNOSIS — G47.10 HYPERSOMNIA: ICD-10-CM

## 2023-09-05 DIAGNOSIS — Z78.9 INTOLERANCE OF CONTINUOUS POSITIVE AIRWAY PRESSURE (CPAP) VENTILATION: ICD-10-CM

## 2023-09-05 DIAGNOSIS — J44.9 CHRONIC OBSTRUCTIVE PULMONARY DISEASE, UNSPECIFIED COPD TYPE: ICD-10-CM

## 2023-09-05 DIAGNOSIS — R06.83 SNORING: ICD-10-CM

## 2023-09-05 DIAGNOSIS — E66.9 OBESITY (BMI 30-39.9): ICD-10-CM

## 2023-09-05 DIAGNOSIS — G47.8 NON-RESTORATIVE SLEEP: ICD-10-CM

## 2023-09-05 DIAGNOSIS — G47.30 SLEEP APNEA, UNSPECIFIED TYPE: Primary | ICD-10-CM

## 2023-09-05 PROCEDURE — G0463 HOSPITAL OUTPT CLINIC VISIT: HCPCS

## 2023-09-05 RX ORDER — ZOLPIDEM TARTRATE 5 MG/1
TABLET ORAL
Qty: 1 TABLET | Refills: 0 | Status: SHIPPED | OUTPATIENT
Start: 2023-09-05

## 2023-09-05 NOTE — PROGRESS NOTES
[facility] New Patient/Consultation       Reason for Consultation: nieves      Patient Care Team:  Edgar Briscoe APRN as PCP - General (Nurse Practitioner)  Chaka Cabrera MD as Consulting Physician (Sleep Medicine)      History of present illness:  Thank you for asking me to see your patient.  The patient is a 73 y.o. male with hyperlipidemia hypertension diabetes mellitus BPH DDD COPD presents today to establish care for NIEVES.  Reviewed copy of sleep study from November 2016 which showed overall AHI 17.6 events per hour lowest SPO2 86%.  Titrated to BiPAP 18/14 cm H2O.  We have data from the machine today however data is year old.  It shows pressure settings of 18/14 with overall AHI 7.9 events per hour.  No current data available.  Intolerant to Pap.  Interested in inspire therapy.  Has already been seen by an ENT physician per patient report.  Told he needs an updated sleep study which is correct.      Social History: No tobacco use 1-2 alcoholic drinks per week 1 coffee a day no drug use    Allergies:  Nitroglycerin    Family History: NIEVES yes       Current Outpatient Medications:     amLODIPine-benazepril (LOTREL) 10-20 MG per capsule, Take 1 capsule by mouth Daily., Disp: 90 capsule, Rfl: 1    aspirin 81 MG EC tablet, Take 1 tablet by mouth Daily., Disp: 90 tablet, Rfl: 3    atorvastatin (LIPITOR) 40 MG tablet, Take 1 tablet by mouth Daily., Disp: 90 tablet, Rfl: 1    brimonidine (ALPHAGAN) 0.15 % ophthalmic solution, Administer 1 drop to the right eye 2 (Two) Times a Day., Disp: , Rfl:     cyclobenzaprine (FLEXERIL) 10 MG tablet, Take 1 tablet by mouth 3 (Three) Times a Day As Needed for Muscle Spasms., Disp: 270 tablet, Rfl: 1    fexofenadine (ALLEGRA) 180 MG tablet, Take 1 tablet by mouth Daily., Disp: 90 tablet, Rfl: 1    finasteride (PROSCAR) 5 MG tablet, Take 1 tablet by mouth Daily., Disp: 90 tablet, Rfl: 1    gabapentin (NEURONTIN) 300 MG capsule, Take 1 capsule by mouth 3 (Three) Times a Day.,  "Disp: 270 capsule, Rfl: 1    Insulin Glargine (LANTUS SOLOSTAR) 100 UNIT/ML injection pen, Inject 46 Units under the skin into the appropriate area as directed Daily., Disp: 15 mL, Rfl: 3    Insulin Pen Needle (Pen Needles) 31G X 5 MM misc, 1 each Daily. DX e11.9 used with Lantus daily, Disp: 1000 each, Rfl: 1    meloxicam (MOBIC) 15 MG tablet, Take 1 tablet by mouth Daily., Disp: 90 tablet, Rfl: 1    montelukast (SINGULAIR) 10 MG tablet, Take 1 tablet by mouth Every Night., Disp: 90 tablet, Rfl: 1    multivitamins-minerals (PRESERVISION AREDS 2) capsule capsule, Take 1 capsule by mouth Daily., Disp: 90 capsule, Rfl: 3    omeprazole (priLOSEC) 40 MG capsule, Take 1 capsule by mouth Daily., Disp: 90 capsule, Rfl: 1    PRECISION XTRA TEST STRIPS test strip, 1 each by Other route 3 (Three) Times a Day., Disp: 600 each, Rfl: 1    Refresh Celluvisc 1 % gel eye gel, , Disp: , Rfl:     SITagliptin-metFORMIN HCl ER (Janumet XR)  MG tablet, Take 2 tablets by mouth Daily., Disp: 180 tablet, Rfl: 1    tamsulosin (FLOMAX) 0.4 MG capsule 24 hr capsule, Take 1 capsule by mouth Daily., Disp: 90 capsule, Rfl: 3    timolol (TIMOPTIC) 0.5 % ophthalmic solution, , Disp: , Rfl:     timolol (TIMOPTIC-XR) 0.5 % ophthalmic gel-forming, Administer 1 drop to the right eye 2 (Two) Times a Day., Disp: , Rfl:     zolpidem (Ambien) 5 MG tablet, Take 1/2 tab as needed for sleep at night of sleep study only. Do not use at home., Disp: 1 tablet, Rfl: 0    Vital Signs:    Vitals:    09/05/23 0900   Pulse: (!) 43   SpO2: 97%   Weight: 114 kg (251 lb 6.4 oz)   Height: 180.3 cm (71\")      Body mass index is 35.06 kg/m².  Neck Circumference: 17.5 inches      REVIEW OF SYSTEMS.  Full review of systems available on the intake form which is scanned in the media tab.  The relevant positive are noted below  Daytime excessive sleepiness with [place] Sleepiness Scale :Total score: 3   Snoring  All negative      Physical exam:  Vitals:    09/05/23 0900 " "  Pulse: (!) 43   SpO2: 97%   Weight: 114 kg (251 lb 6.4 oz)   Height: 180.3 cm (71\")    Body mass index is 35.06 kg/m². Neck Circumference: 17.5 inches  HEENT: Head is atraumatic, normocephalic  Eyes: pupils are round equal and reacting to light and accommodation, conjunctiva normal  Throat: tongue normal  NECK:Neck Circumference: 17.5 inches  RESPIRATORY SYSTEM: Regular respirations  EXTREMITES: No cyanosis, clubbing  NEUROLOGICAL SYSTEM: Oriented x 3, no gross motor defects, gait normal      Impression:  1. Sleep apnea, unspecified type    2. Hypersomnia    3. Non-restorative sleep    4. Intolerance of continuous positive airway pressure (CPAP) ventilation    5. Obesity (BMI 30-39.9)    6. Snoring    7. Chronic obstructive pulmonary disease, unspecified COPD type        Plan:    Good sleep hygiene measures should be maintained.  Weight loss would be beneficial in this patient who is obese BMI 35.1.    I discussed the pathophysiology of obstructive sleep apnea with the patient.  We discussed the adverse outcomes associated with untreated sleep-disordered breathing.  We discussed treatment modalities of obstructive sleep apnea including CPAP device as well as spire device. Sleep study will be scheduled to assess severity of sleep apnea.  Weight loss will be strongly beneficial in order to reduce the severity of sleep-disordered breathing.  Patient has narrow oropharyngeal structure.  Caution during activities that require prolonged concentration is strongly advised.  Patient will be notified of sleep study results after sleep study is completed.  If AHI is at least 15 events per hour refer back to ENT to proceed with surgery.  Prescription for Ambien was provided to bring to the Sleep lab to improve sleep efficiency.  Patient was asked to not take the Ambien at home.    Of note patient per records has COPD; needs a new pulmonary doctor.  Referral placed.  Denies shortness of breath on exertion or wheezing.    Thank " you for allowing me to participate in your patient's care.    Chaka Cabrera MD  Sleep Medicine  09/05/23  11:52 EDT

## 2023-09-13 ENCOUNTER — HOSPITAL ENCOUNTER (OUTPATIENT)
Dept: SLEEP MEDICINE | Facility: HOSPITAL | Age: 73
End: 2023-09-13
Payer: MEDICARE

## 2023-09-13 DIAGNOSIS — E66.9 OBESITY (BMI 30-39.9): ICD-10-CM

## 2023-09-13 DIAGNOSIS — R06.83 SNORING: ICD-10-CM

## 2023-09-13 DIAGNOSIS — G47.10 HYPERSOMNIA: ICD-10-CM

## 2023-09-13 DIAGNOSIS — Z78.9 INTOLERANCE OF CONTINUOUS POSITIVE AIRWAY PRESSURE (CPAP) VENTILATION: ICD-10-CM

## 2023-09-13 DIAGNOSIS — G47.8 NON-RESTORATIVE SLEEP: ICD-10-CM

## 2023-09-13 DIAGNOSIS — G47.30 SLEEP APNEA, UNSPECIFIED TYPE: ICD-10-CM

## 2023-09-13 PROCEDURE — 95810 POLYSOM 6/> YRS 4/> PARAM: CPT

## 2023-09-21 ENCOUNTER — TELEPHONE (OUTPATIENT)
Dept: SLEEP MEDICINE | Facility: HOSPITAL | Age: 73
End: 2023-09-21
Payer: MEDICARE

## 2023-10-03 ENCOUNTER — OFFICE VISIT (OUTPATIENT)
Dept: SLEEP MEDICINE | Facility: HOSPITAL | Age: 73
End: 2023-10-03
Payer: MEDICARE

## 2023-10-03 ENCOUNTER — CLINICAL SUPPORT (OUTPATIENT)
Dept: FAMILY MEDICINE CLINIC | Facility: CLINIC | Age: 73
End: 2023-10-03
Payer: MEDICARE

## 2023-10-03 VITALS
WEIGHT: 247 LBS | OXYGEN SATURATION: 96 % | HEIGHT: 71 IN | DIASTOLIC BLOOD PRESSURE: 70 MMHG | SYSTOLIC BLOOD PRESSURE: 142 MMHG | BODY MASS INDEX: 34.58 KG/M2 | HEART RATE: 70 BPM

## 2023-10-03 DIAGNOSIS — G47.8 NON-RESTORATIVE SLEEP: ICD-10-CM

## 2023-10-03 DIAGNOSIS — Z78.9 INTOLERANCE OF CONTINUOUS POSITIVE AIRWAY PRESSURE (CPAP) VENTILATION: ICD-10-CM

## 2023-10-03 DIAGNOSIS — J44.9 CHRONIC OBSTRUCTIVE PULMONARY DISEASE, UNSPECIFIED COPD TYPE: ICD-10-CM

## 2023-10-03 DIAGNOSIS — R06.83 SNORING: ICD-10-CM

## 2023-10-03 DIAGNOSIS — G47.33 SEVERE OBSTRUCTIVE SLEEP APNEA: Primary | ICD-10-CM

## 2023-10-03 DIAGNOSIS — G47.61 PERIODIC LIMB MOVEMENT DISORDER: ICD-10-CM

## 2023-10-03 DIAGNOSIS — G47.10 HYPERSOMNIA: ICD-10-CM

## 2023-10-03 DIAGNOSIS — Z01.818 PRE-OPERATIVE CLEARANCE: Primary | ICD-10-CM

## 2023-10-03 DIAGNOSIS — E66.9 OBESITY (BMI 30-39.9): ICD-10-CM

## 2023-10-03 RX ORDER — ALBUTEROL SULFATE 90 UG/1
2 AEROSOL, METERED RESPIRATORY (INHALATION) EVERY 4 HOURS PRN
COMMUNITY

## 2023-10-03 NOTE — PROGRESS NOTES
"Follow Up Sleep Disorders [facility] Note     Chief Complaint:  DARIO and PLMS    Primary Care Physician: Edgar Briscoe APRN    Interval History:   The patient is a 73 y.o. male  who was last seen in the sleep lab: 9/13/2023 for PSG; updated sleep study was needed as part of work-up for inspire therapy.  At last visit we discussed that he is intolerant to Pap and we do not have any current data available from his PAP machine.  He already seen ENT physician and was told he needed an updated sleep study which was correct.  Sleep study showed overall AHI 41.8 events per hour with lowest SPO2 84%.  Patient was advised to continue care per ENT in terms of inspire therapy.  Of note study also showed PLM with arousal index of 18.6 events per hour; advised that if treatment of sleep apnea does not resolve limb movements during sleep and/or hypersomnia nonrestorative sleep, can consider medication.  Presents today to discuss results.    Patient had a few questions about moving forward with DISE for inspire therapy and the surgery itself.  Very excited and motivated about moving forward.    DME: [facility]; Data range: 9/3/2023 - 10/2/2020.; Average usage: 9 hours 14 minutes; Average AHI: 7.3, Average PAP pressure: 18/14 cm H2O.    Review of Systems:    A complete review of systems was done and all were negative with the exception of dry mouth.     Social History:    Social History     Socioeconomic History    Marital status:    Tobacco Use    Smoking status: Former    Smokeless tobacco: Never   Vaping Use    Vaping Use: Never used   Substance and Sexual Activity    Alcohol use: Yes     Comment: occas    Drug use: Never    Sexual activity: Defer       Allergies:  Nitroglycerin     Medication Review:  Reviewed.      Vital Signs:    Vitals:    10/03/23 1254   BP: 142/70   BP Location: Left arm   Patient Position: Sitting   Pulse: 70   SpO2: 96%   Weight: 112 kg (247 lb)   Height: 180.3 cm (70.98\")     Body mass index is 34.47 " kg/m².    Physical Exam:    Constitutional:  Well developed 73 y.o. male that appears in no apparent distress.  Awake & oriented times 3.  Normal mood with normal recent and remote memory and normal judgement.  Eyes:  Conjunctivae normal.  Oropharynx: Previously, moist mucous membranes.    Self-administered [place] Sleepiness Scale test results:  1  0-5 Lower normal daytime sleepiness  6-10 Higher normal daytime sleepiness  11-12 Mild, 13-15 Moderate, & 16-24 Severe excessive daytime sleepiness    Impression:   1. Severe obstructive sleep apnea    2. Hypersomnia    3. Non-restorative sleep    4. Intolerance of continuous positive airway pressure (CPAP) ventilation    5. Obesity (BMI 30-39.9)    6. Snoring    7. Chronic obstructive pulmonary disease, unspecified COPD type    8. Periodic limb movement disorder      Continue care per ENT in terms of DISE procedure; if this shows appropriate airway collapse then proceed per ENT in terms of hypoglossal nerve stimulator placement.    Plan:  Good sleep hygiene measures should be maintained.  Weight loss would be beneficial in this patient who obese by Body mass index is 34.47 kg/m²..          I answered all of the patient's questions.  The patient will call for any problems and will follow up after least 1 month postop      Chaka Cabrera MD  Sleep Medicine  10/03/23  13:18 EDT

## 2023-11-13 ENCOUNTER — OFFICE VISIT (OUTPATIENT)
Dept: PULMONOLOGY | Facility: CLINIC | Age: 73
End: 2023-11-13
Payer: MEDICARE

## 2023-11-13 VITALS
HEART RATE: 69 BPM | RESPIRATION RATE: 16 BRPM | DIASTOLIC BLOOD PRESSURE: 78 MMHG | TEMPERATURE: 98.7 F | SYSTOLIC BLOOD PRESSURE: 127 MMHG | WEIGHT: 249 LBS | BODY MASS INDEX: 34.86 KG/M2 | HEIGHT: 71 IN

## 2023-11-13 DIAGNOSIS — G47.33 OBSTRUCTIVE SLEEP APNEA: ICD-10-CM

## 2023-11-13 DIAGNOSIS — J41.8 MIXED SIMPLE AND MUCOPURULENT CHRONIC BRONCHITIS: Primary | ICD-10-CM

## 2023-11-13 PROCEDURE — 1160F RVW MEDS BY RX/DR IN RCRD: CPT | Performed by: INTERNAL MEDICINE

## 2023-11-13 PROCEDURE — 1159F MED LIST DOCD IN RCRD: CPT | Performed by: INTERNAL MEDICINE

## 2023-11-13 PROCEDURE — 3074F SYST BP LT 130 MM HG: CPT | Performed by: INTERNAL MEDICINE

## 2023-11-13 PROCEDURE — 99203 OFFICE O/P NEW LOW 30 MIN: CPT | Performed by: INTERNAL MEDICINE

## 2023-11-13 PROCEDURE — 3078F DIAST BP <80 MM HG: CPT | Performed by: INTERNAL MEDICINE

## 2023-11-13 RX ORDER — ESOMEPRAZOLE MAGNESIUM 40 MG/1
1 CAPSULE, DELAYED RELEASE ORAL EVERY 24 HOURS
COMMUNITY

## 2023-11-13 RX ORDER — LANSOPRAZOLE 30 MG/1
CAPSULE, DELAYED RELEASE ORAL EVERY 24 HOURS
COMMUNITY

## 2023-11-13 RX ORDER — GLIPIZIDE 2.5 MG/1
TABLET, EXTENDED RELEASE ORAL EVERY 24 HOURS
COMMUNITY

## 2023-11-13 NOTE — PROGRESS NOTES
Pulmonary Consultation    Chaka Cabrera MD,    Thank you for asking me to see Taqueria Soto for   Chief Complaint   Patient presents with    COPD    Cough    Sleep Apnea    Establish Care     New pt here for COPD    .      History of Present Illness  Taqueria Soto is a 73 y.o. male with a PMH significant for COPD and obstructive sleep apnea with history of tobacco abuse who presents for evaluation patient has been seeing Dr. Kruse pulmonologist and he takes only albuterol inhaler as needed which he says he has not taken for some time because he felt he did not need it he denies any significant cough expectoration or wheezing and has been staying fairly active he is compliant with his CPAP      Tobacco use history:  Former smoker      Review of Systems: History obtained from chart review and the patient.  Review of Systems   Respiratory:  Positive for shortness of breath.    All other systems reviewed and are negative.    As described in the HPI. Otherwise, remainder of ROS (14 systems) were negative.    Patient Active Problem List   Diagnosis    Type 1 diabetes mellitus    Hyperlipidemia    Essential hypertension    Benign prostatic hyperplasia    Cervical radiculopathy    Diabetes mellitus without complication    High blood pressure    Chronic obstructive pulmonary disease    Hypertensive heart disease without congestive heart failure    Hemorrhoids    Gastroesophageal reflux disease    DDD (degenerative disc disease), cervical    Cervical spondylosis without myelopathy         Current Outpatient Medications:     albuterol sulfate  (90 Base) MCG/ACT inhaler, Inhale 2 puffs Every 4 (Four) Hours As Needed., Disp: , Rfl:     amLODIPine-benazepril (LOTREL) 10-20 MG per capsule, Take 1 capsule by mouth Daily., Disp: 90 capsule, Rfl: 1    aspirin 81 MG EC tablet, Take 1 tablet by mouth Daily., Disp: 90 tablet, Rfl: 3    atorvastatin (LIPITOR) 40 MG tablet, Take 1 tablet by mouth Daily., Disp: 90 tablet,  See telephone encounter Rfl: 1    brimonidine (ALPHAGAN) 0.15 % ophthalmic solution, Administer 1 drop to the right eye 2 (Two) Times a Day., Disp: , Rfl:     cyclobenzaprine (FLEXERIL) 10 MG tablet, Take 1 tablet by mouth 3 (Three) Times a Day As Needed for Muscle Spasms., Disp: 270 tablet, Rfl: 1    fexofenadine (ALLEGRA) 180 MG tablet, Take 1 tablet by mouth Daily., Disp: 90 tablet, Rfl: 1    finasteride (PROSCAR) 5 MG tablet, Take 1 tablet by mouth Daily., Disp: 90 tablet, Rfl: 1    gabapentin (NEURONTIN) 300 MG capsule, Take 1 capsule by mouth 3 (Three) Times a Day., Disp: 270 capsule, Rfl: 1    Insulin Glargine (LANTUS SOLOSTAR) 100 UNIT/ML injection pen, Inject 46 Units under the skin into the appropriate area as directed Daily., Disp: 15 mL, Rfl: 3    Insulin Pen Needle (Pen Needles) 31G X 5 MM misc, 1 each Daily. DX e11.9 used with Lantus daily, Disp: 1000 each, Rfl: 1    meloxicam (MOBIC) 15 MG tablet, Take 1 tablet by mouth Daily., Disp: 90 tablet, Rfl: 1    montelukast (SINGULAIR) 10 MG tablet, Take 1 tablet by mouth Every Night., Disp: 90 tablet, Rfl: 1    multivitamins-minerals (PRESERVISION AREDS 2) capsule capsule, Take 1 capsule by mouth Daily., Disp: 90 capsule, Rfl: 3    omeprazole (priLOSEC) 40 MG capsule, Take 1 capsule by mouth Daily., Disp: 90 capsule, Rfl: 1    PRECISION XTRA TEST STRIPS test strip, 1 each by Other route 3 (Three) Times a Day., Disp: 600 each, Rfl: 1    SITagliptin-metFORMIN HCl ER (Janumet XR)  MG tablet, Take 2 tablets by mouth Daily., Disp: 180 tablet, Rfl: 1    tamsulosin (FLOMAX) 0.4 MG capsule 24 hr capsule, Take 1 capsule by mouth Daily., Disp: 90 capsule, Rfl: 3    timolol (TIMOPTIC-XR) 0.5 % ophthalmic gel-forming, Administer 1 drop to the right eye 2 (Two) Times a Day., Disp: , Rfl:     esomeprazole (nexIUM) 40 MG capsule, 1 capsule Daily. (Patient not taking: Reported on 11/13/2023), Disp: , Rfl:     glipizide (Glucotrol XL) 2.5 MG 24 hr tablet, Daily. (Patient not taking: Reported  on 2023), Disp: , Rfl:     lansoprazole (Prevacid) 30 MG capsule, Daily. (Patient not taking: Reported on 2023), Disp: , Rfl:     Refresh Celluvisc 1 % gel eye gel, , Disp: , Rfl:     timolol (TIMOPTIC) 0.5 % ophthalmic solution, , Disp: , Rfl:     zolpidem (Ambien) 5 MG tablet, Take 1/2 tab as needed for sleep at night of sleep study only. Do not use at home. (Patient not taking: Reported on 2023), Disp: 1 tablet, Rfl: 0    Allergies   Allergen Reactions    Nitroglycerin Other (See Comments)     Heart rate dropped down  Other reaction(s): Other (See Comments)  Heart rate dropped down       Past Medical History:   Diagnosis Date    Acid reflux     Arthritis     Cataract     Cervical radiculopathy 2019    Cervicalgia 2019    COPD (chronic obstructive pulmonary disease)     Deafness     Diabetes     Diabetes mellitus, type 2 2018    Emphysema lung     Essential hypertension 2018    Facet arthritis of cervical region 2019    Hematuria, microscopic     Hemorrhoids     High cholesterol     Hyperlipidemia 2018    Hypertension     Insulin dependent type 2 diabetes mellitus, controlled     Prostatitis     Unspecified    Shortness of breath     Sinus trouble     Sleep apnea      Past Surgical History:   Procedure Laterality Date    CATARACT EXTRACTION WITH INTRAOCULAR LENS IMPLANT       Social History     Socioeconomic History    Marital status:    Tobacco Use    Smoking status: Former     Packs/day: 1.00     Years: 50.00     Additional pack years: 0.00     Total pack years: 50.00     Types: Cigarettes     Quit date:      Years since quittin.8    Smokeless tobacco: Never   Vaping Use    Vaping Use: Never used   Substance and Sexual Activity    Alcohol use: Yes     Comment: occas    Drug use: Never    Sexual activity: Defer     Family History   Problem Relation Age of Onset    No Known Problems Mother     Arthritis Father     Sleep apnea Father     Diabetes  "Sister         Unspecified Type, Mellitus       No radiology results for the last 90 days.        Objective     Blood pressure 127/78, pulse 69, temperature 98.7 °F (37.1 °C), temperature source Temporal, resp. rate 16, height 179.1 cm (70.5\"), weight 113 kg (249 lb).  Physical Exam  Vitals and nursing note reviewed.   Constitutional:       Appearance: He is obese.   HENT:      Head: Normocephalic and atraumatic.      Nose: Nose normal.      Mouth/Throat:      Mouth: Mucous membranes are moist.      Pharynx: Oropharynx is clear.   Eyes:      Extraocular Movements: Extraocular movements intact.      Conjunctiva/sclera: Conjunctivae normal.      Pupils: Pupils are equal, round, and reactive to light.   Cardiovascular:      Rate and Rhythm: Normal rate and regular rhythm.      Pulses: Normal pulses.      Heart sounds: Normal heart sounds.   Pulmonary:      Effort: Pulmonary effort is normal.      Breath sounds: Normal breath sounds.   Abdominal:      General: Abdomen is flat. Bowel sounds are normal.      Palpations: Abdomen is soft.   Musculoskeletal:         General: Normal range of motion.      Cervical back: Normal range of motion and neck supple.   Skin:     General: Skin is warm.      Capillary Refill: Capillary refill takes 2 to 3 seconds.   Neurological:      General: No focal deficit present.      Mental Status: He is alert and oriented to person, place, and time.   Psychiatric:         Mood and Affect: Mood normal.         Behavior: Behavior normal.       Immunization History   Administered Date(s) Administered    COVID-19 (MODERNA) 1st,2nd,3rd Dose Monovalent 01/21/2021, 02/19/2021, 11/03/2021    COVID-19 (MODERNA) BIVALENT 12+YRS 09/27/2022    COVID-19 (MODERNA) Monovalent Original Booster 04/05/2022    FLUAD TRI 65YR+ 09/06/2017    Fluzone (or Fluarix & Flulaval for VFC) >6mos 09/27/2022, 10/03/2023    Fluzone High Dose =>65 Years ([employer] ONLY) 09/28/2016    Influenza Injectable Mdck Pf Quad 10/15/2021    " Influenza Quad Vaccine (Inpatient) 09/17/2020    Influenza, Unspecified 09/01/2019, 10/15/2021, 09/27/2022    Pneumococcal Conjugate 20-Valent (PCV20) 01/01/2018    Pneumococcal Polysaccharide (PPSV23) 11/15/2016, 09/06/2017    Shingrix 02/09/2021, 04/06/2021    Tdap 08/11/2021            Assessment & Plan     Diagnoses and all orders for this visit:    1. Mixed simple and mucopurulent chronic bronchitis (Primary)    2. Obstructive sleep apnea         Discussion/ Recommendations:   Patient is advised to continue his albuterol inhaler as needed  Patient has already had his shots  Advised to reduce weight his BMI is 35.22  Advised compliance with his CPAP  Patient has had PFTs done this year  Discussed vaccination and recommended                Return in about 6 months (around 5/13/2024).      Thank you for allowing me to participate in the care of Taqueria MATY Soto. Please do not hesitate to contact me with any questions.         This document has been electronically signed by Rickey Malone MD on November 13, 2023 09:27 EST

## 2023-11-28 DIAGNOSIS — Z79.4 TYPE 2 DIABETES MELLITUS WITH HYPERGLYCEMIA, WITH LONG-TERM CURRENT USE OF INSULIN: ICD-10-CM

## 2023-11-28 DIAGNOSIS — E11.65 TYPE 2 DIABETES MELLITUS WITH HYPERGLYCEMIA, WITH LONG-TERM CURRENT USE OF INSULIN: ICD-10-CM

## 2023-11-28 RX ORDER — BLOOD SUGAR DIAGNOSTIC
1 STRIP MISCELLANEOUS 3 TIMES DAILY
Qty: 600 EACH | Refills: 1 | Status: SHIPPED | OUTPATIENT
Start: 2023-11-28

## 2023-11-29 DIAGNOSIS — E11.65 TYPE 2 DIABETES MELLITUS WITH HYPERGLYCEMIA, WITH LONG-TERM CURRENT USE OF INSULIN: ICD-10-CM

## 2023-11-29 DIAGNOSIS — Z79.4 TYPE 2 DIABETES MELLITUS WITH HYPERGLYCEMIA, WITH LONG-TERM CURRENT USE OF INSULIN: ICD-10-CM

## 2023-11-29 RX ORDER — PEN NEEDLE, DIABETIC 30 GX3/16"
1 NEEDLE, DISPOSABLE MISCELLANEOUS DAILY
Qty: 100 EACH | Refills: 1 | Status: SHIPPED | OUTPATIENT
Start: 2023-11-29

## 2023-11-29 RX ORDER — PEN NEEDLE, DIABETIC 30 GX3/16"
1 NEEDLE, DISPOSABLE MISCELLANEOUS DAILY
Qty: 1000 EACH | Refills: 1 | Status: SHIPPED | OUTPATIENT
Start: 2023-11-29 | End: 2023-11-29 | Stop reason: SDUPTHER

## 2023-12-19 ENCOUNTER — OFFICE VISIT (OUTPATIENT)
Dept: ORTHOPEDIC SURGERY | Facility: CLINIC | Age: 73
End: 2023-12-19
Payer: MEDICARE

## 2023-12-19 VITALS
DIASTOLIC BLOOD PRESSURE: 46 MMHG | OXYGEN SATURATION: 95 % | BODY MASS INDEX: 34.86 KG/M2 | HEART RATE: 38 BPM | WEIGHT: 249 LBS | SYSTOLIC BLOOD PRESSURE: 135 MMHG | HEIGHT: 71 IN

## 2023-12-19 DIAGNOSIS — M17.0 BILATERAL PRIMARY OSTEOARTHRITIS OF KNEE: Primary | ICD-10-CM

## 2023-12-19 NOTE — PROGRESS NOTES
"Chief Complaint  Follow-up of the Left Knee and Follow-up of the Right Knee    Subjective      Taqueria Soto presents to [facility] for follow-up of bilateral knee osteoarthritis the patient has been managed conservatively with intermittent injections.  He has also taken anti-inflammatories and has been to physical therapy in the past.  He last received gel injections into the bilateral knees on 6/16/2023 and is here today to receive repeat injections.  Reports that they worked pretty well for him.     Objective   Allergies   Allergen Reactions    Nitroglycerin Other (See Comments)     Heart rate dropped down  Other reaction(s): Other (See Comments)  Heart rate dropped down       Vital Signs:   /46   Pulse (!) 38   Ht 179.1 cm (70.5\")   Wt 113 kg (249 lb)   SpO2 95%   BMI 35.22 kg/m²       Physical Exam    Constitutional: Awake, alert. Well nourished appearance.    Integumentary: Warm, dry, intact. No obvious rashes.    HENT: Atraumatic, normocephalic.   Respiratory: Non labored respirations .   Cardiovascular: Intact peripheral pulses.    Psychiatric: Normal mood and affect. A&O X3    Ortho Exam   Bilateral knees: Range of motion 0 to 110 degrees.  Stable to varus and valgus stress.  Stable to anterior posterior drawer test.  Neurovascular intact.  No edema noted.  Tender to palpation the medial and lateral joint line.  No edema noted.    Imaging Results (Most Recent)       None             Large Joint RIGHT KNEE: R knee  Date/Time: 12/19/2023 10:27 AM  Consent given by: patient  Site marked: site marked  Timeout: Immediately prior to procedure a time out was called to verify the correct patient, procedure, equipment, support staff and site/side marked as required   Supporting Documentation  Indications: pain   Procedure Details  Location: knee - R knee  Preparation: Patient was prepped and draped in the usual sterile fashion  Needle gauge: 21G.  Medications administered: " 48 mg hylan 48 MG/6ML  Patient tolerance: patient tolerated the procedure well with no immediate complications      Large Joint LEFT KNEE: L knee  Date/Time: 2023 10:27 AM  Consent given by: patient  Site marked: site marked  Timeout: Immediately prior to procedure a time out was called to verify the correct patient, procedure, equipment, support staff and site/side marked as required   Supporting Documentation  Indications: pain   Procedure Details  Location: knee - L knee  Preparation: Patient was prepped and draped in the usual sterile fashion  Needle gauge: 21G.  Medications administered: 48 mg hylan 48 MG/6ML  Patient tolerance: patient tolerated the procedure well with no immediate complications              Assessment and Plan   Problem List Items Addressed This Visit    None  Visit Diagnoses       Bilateral primary osteoarthritis of knee    -  Primary    Relevant Orders    Large Joint RIGHT KNEE: R knee    Large Joint LEFT KNEE: L knee            Follow Up   Return in about 6 months (around 2024).    Patient is a non-smoker, did not discuss options for smoking cessation.     Social History     Socioeconomic History    Marital status:    Tobacco Use    Smoking status: Former     Packs/day: 1.00     Years: 50.00     Additional pack years: 0.00     Total pack years: 50.00     Types: Cigarettes     Quit date:      Years since quittin.9    Smokeless tobacco: Never   Vaping Use    Vaping Use: Never used   Substance and Sexual Activity    Alcohol use: Yes     Comment: occas    Drug use: Never    Sexual activity: Defer       Patient Instructions   Patient received Synvisc injection today in office into the bilateral knee and tolerated the procedure well without complication.  Counseled that these injections can be given every 6 months and 1 day with insurance approval. Pt can also receive steroid injections intermittently between these doses.  Steroid injections can be given every 3  months.    Follow up in 6 months for repeat injection if needed. Call with questions, concerns, or worsening symptoms.   Patient was given instructions and counseling regarding his condition or for health maintenance advice. Please see specific information pulled into the AVS if appropriate.

## 2023-12-28 NOTE — PATIENT INSTRUCTIONS
Patient received Synvisc injection today in office into the bilateral knee and tolerated the procedure well without complication.  Counseled that these injections can be given every 6 months and 1 day with insurance approval. Pt can also receive steroid injections intermittently between these doses.  Steroid injections can be given every 3 months.    Follow up in 6 months for repeat injection if needed. Call with questions, concerns, or worsening symptoms.

## 2024-01-09 ENCOUNTER — OFFICE VISIT (OUTPATIENT)
Dept: SLEEP MEDICINE | Facility: HOSPITAL | Age: 74
End: 2024-01-09
Payer: MEDICARE

## 2024-01-09 VITALS — HEART RATE: 68 BPM | HEIGHT: 71 IN | OXYGEN SATURATION: 95 % | WEIGHT: 243.3 LBS | BODY MASS INDEX: 34.06 KG/M2

## 2024-01-09 DIAGNOSIS — Z78.9 INTOLERANCE OF CONTINUOUS POSITIVE AIRWAY PRESSURE (CPAP) VENTILATION: ICD-10-CM

## 2024-01-09 DIAGNOSIS — G47.33 SEVERE OBSTRUCTIVE SLEEP APNEA: Primary | ICD-10-CM

## 2024-01-09 DIAGNOSIS — G47.61 PERIODIC LIMB MOVEMENT DISORDER: ICD-10-CM

## 2024-01-09 DIAGNOSIS — J44.9 CHRONIC OBSTRUCTIVE PULMONARY DISEASE, UNSPECIFIED COPD TYPE: ICD-10-CM

## 2024-01-09 DIAGNOSIS — G47.10 HYPERSOMNIA: ICD-10-CM

## 2024-01-09 DIAGNOSIS — G47.8 NON-RESTORATIVE SLEEP: ICD-10-CM

## 2024-01-09 DIAGNOSIS — E66.9 OBESITY (BMI 30-39.9): ICD-10-CM

## 2024-01-09 PROCEDURE — G0463 HOSPITAL OUTPT CLINIC VISIT: HCPCS

## 2024-01-09 RX ORDER — OFLOXACIN 3 MG/ML
SOLUTION/ DROPS OPHTHALMIC
COMMUNITY
Start: 2023-11-29

## 2024-01-09 NOTE — PROGRESS NOTES
"Follow Up Sleep Disorders Center Note     Chief Complaint:  DARIO     Primary Care Physician: Edgar Briscoe APRN     Interval History:   The patient is a 73 y.o. male  who was last seen in the sleep lab: 10/3/2023.  Presents today for follow-up on DARIO.  Sleep study showed AHI 41.8 lowest SpO2 84%.  Patient was advised to continue care per ENT in terms of inspire therapy.  At last visit patient had questions about moving forward with [facility]E for inspire therapy.  Advised to continue care with ENT.    Downloaded PAP Data Reviewed For Compliance:  DME is [place] [employer].  Downloads between 12/10/2023 - 2024.  Average usage is 9 hours 39 minutes.  Average AHI is 2.6.  Average auto CPAP pressure is 18.6 cm H2O    I have reviewed the above results and compared them with the patient's last downloads and reviewed with the patient.    Review of Systems:    A complete review of systems was done and all were negative with the exception of dry mouth    Social History:    Social History     Socioeconomic History    Marital status:    Tobacco Use    Smoking status: Former     Packs/day: 1.00     Years: 50.00     Additional pack years: 0.00     Total pack years: 50.00     Types: Cigarettes     Quit date:      Years since quittin.0    Smokeless tobacco: Never   Vaping Use    Vaping Use: Never used   Substance and Sexual Activity    Alcohol use: Yes     Comment: occas    Drug use: Never    Sexual activity: Defer       Allergies:  Nitroglycerin     Medication Review:  Reviewed.      Vital Signs:    Vitals:    24 0900   Pulse: 68   SpO2: 95%   Weight: 110 kg (243 lb 4.8 oz)   Height: 180.3 cm (70.98\")     Body mass index is 33.95 kg/m².    Physical Exam:    Constitutional:  Well developed 73 y.o. male that appears in no apparent distress.  Awake & oriented times 3.  Normal mood with normal recent and remote memory and normal judgement.  Eyes:  Conjunctivae normal.  Oropharynx: Previously, moist mucous " membranes.    Self-administered [place] Sleepiness Scale test results: 0  0-5 Lower normal daytime sleepiness  6-10 Higher normal daytime sleepiness  11-12 Mild, 13-15 Moderate, & 16-24 Severe excessive daytime sleepiness    Impression:   1. Severe obstructive sleep apnea    2. Hypersomnia    3. Non-restorative sleep    4. Intolerance of continuous positive airway pressure (CPAP) ventilation    5. Obesity (BMI 30-39.9)    6. Chronic obstructive pulmonary disease, unspecified COPD type    7. Periodic limb movement disorder      Will refer to Dr Vang and Dr Coppola of ENT for inspire    Plan:  Good sleep hygiene measures should be maintained.  Weight loss would be beneficial in this patient who obese by Body mass index is 33.95 kg/m²..      After evaluating the patient and assessing results available, the patient is benefiting from the treatment being provided.     I answered all of the patient's questions.  The patient will call for any problems and will follow up in as needee.      Chaka Cabrera MD  Sleep Medicine  01/09/24  09:46 EST

## 2024-02-02 ENCOUNTER — LAB (OUTPATIENT)
Dept: LAB | Facility: HOSPITAL | Age: 74
End: 2024-02-02
Payer: MEDICARE

## 2024-02-02 DIAGNOSIS — Z79.4 TYPE 2 DIABETES MELLITUS WITH HYPERGLYCEMIA, WITH LONG-TERM CURRENT USE OF INSULIN: ICD-10-CM

## 2024-02-02 DIAGNOSIS — I10 ESSENTIAL HYPERTENSION: ICD-10-CM

## 2024-02-02 DIAGNOSIS — E78.5 HYPERLIPIDEMIA, UNSPECIFIED HYPERLIPIDEMIA TYPE: ICD-10-CM

## 2024-02-02 DIAGNOSIS — E11.65 TYPE 2 DIABETES MELLITUS WITH HYPERGLYCEMIA, WITH LONG-TERM CURRENT USE OF INSULIN: ICD-10-CM

## 2024-02-02 LAB
ALBUMIN SERPL-MCNC: 4.8 G/DL (ref 3.5–5.2)
ALBUMIN UR-MCNC: <1.2 MG/DL
ALBUMIN/GLOB SERPL: 1.7 G/DL
ALP SERPL-CCNC: 75 U/L (ref 39–117)
ALT SERPL W P-5'-P-CCNC: 12 U/L (ref 1–41)
ANION GAP SERPL CALCULATED.3IONS-SCNC: 10.4 MMOL/L (ref 5–15)
AST SERPL-CCNC: 23 U/L (ref 1–40)
BILIRUB SERPL-MCNC: 0.6 MG/DL (ref 0–1.2)
BUN SERPL-MCNC: 14 MG/DL (ref 8–23)
BUN/CREAT SERPL: 14 (ref 7–25)
CALCIUM SPEC-SCNC: 9.6 MG/DL (ref 8.6–10.5)
CHLORIDE SERPL-SCNC: 104 MMOL/L (ref 98–107)
CHOLEST SERPL-MCNC: 118 MG/DL (ref 0–200)
CO2 SERPL-SCNC: 24.6 MMOL/L (ref 22–29)
CREAT SERPL-MCNC: 1 MG/DL (ref 0.76–1.27)
CREAT UR-MCNC: 71 MG/DL
DEPRECATED RDW RBC AUTO: 43.3 FL (ref 37–54)
EGFRCR SERPLBLD CKD-EPI 2021: 78.5 ML/MIN/1.73
ERYTHROCYTE [DISTWIDTH] IN BLOOD BY AUTOMATED COUNT: 14.2 % (ref 12.3–15.4)
GLOBULIN UR ELPH-MCNC: 2.8 GM/DL
GLUCOSE SERPL-MCNC: 111 MG/DL (ref 65–99)
HBA1C MFR BLD: 5.7 % (ref 4.8–5.6)
HCT VFR BLD AUTO: 41.8 % (ref 37.5–51)
HDLC SERPL-MCNC: 40 MG/DL (ref 40–60)
HGB BLD-MCNC: 13.7 G/DL (ref 13–17.7)
LDLC SERPL CALC-MCNC: 60 MG/DL (ref 0–100)
LDLC/HDLC SERPL: 1.48 {RATIO}
MCH RBC QN AUTO: 27.8 PG (ref 26.6–33)
MCHC RBC AUTO-ENTMCNC: 32.8 G/DL (ref 31.5–35.7)
MCV RBC AUTO: 85 FL (ref 79–97)
MICROALBUMIN/CREAT UR: NORMAL MG/G{CREAT}
PLATELET # BLD AUTO: 167 10*3/MM3 (ref 140–450)
PMV BLD AUTO: 11.9 FL (ref 6–12)
POTASSIUM SERPL-SCNC: 4.3 MMOL/L (ref 3.5–5.2)
PROT SERPL-MCNC: 7.6 G/DL (ref 6–8.5)
RBC # BLD AUTO: 4.92 10*6/MM3 (ref 4.14–5.8)
SODIUM SERPL-SCNC: 139 MMOL/L (ref 136–145)
TRIGL SERPL-MCNC: 95 MG/DL (ref 0–150)
TSH SERPL DL<=0.05 MIU/L-ACNC: 2.34 UIU/ML (ref 0.27–4.2)
VLDLC SERPL-MCNC: 18 MG/DL (ref 5–40)
WBC NRBC COR # BLD AUTO: 6.99 10*3/MM3 (ref 3.4–10.8)

## 2024-02-02 PROCEDURE — 85027 COMPLETE CBC AUTOMATED: CPT

## 2024-02-02 PROCEDURE — 82043 UR ALBUMIN QUANTITATIVE: CPT

## 2024-02-02 PROCEDURE — 36415 COLL VENOUS BLD VENIPUNCTURE: CPT

## 2024-02-02 PROCEDURE — 83036 HEMOGLOBIN GLYCOSYLATED A1C: CPT

## 2024-02-02 PROCEDURE — 82570 ASSAY OF URINE CREATININE: CPT

## 2024-02-02 PROCEDURE — 80061 LIPID PANEL: CPT

## 2024-02-02 PROCEDURE — 80053 COMPREHEN METABOLIC PANEL: CPT

## 2024-02-02 PROCEDURE — 84443 ASSAY THYROID STIM HORMONE: CPT

## 2024-02-12 ENCOUNTER — OFFICE VISIT (OUTPATIENT)
Dept: FAMILY MEDICINE CLINIC | Facility: CLINIC | Age: 74
End: 2024-02-12
Payer: MEDICARE

## 2024-02-12 VITALS
SYSTOLIC BLOOD PRESSURE: 115 MMHG | WEIGHT: 248.6 LBS | HEIGHT: 71 IN | DIASTOLIC BLOOD PRESSURE: 58 MMHG | OXYGEN SATURATION: 95 % | HEART RATE: 60 BPM | BODY MASS INDEX: 34.8 KG/M2 | TEMPERATURE: 97.1 F

## 2024-02-12 DIAGNOSIS — K21.9 GASTROESOPHAGEAL REFLUX DISEASE, UNSPECIFIED WHETHER ESOPHAGITIS PRESENT: ICD-10-CM

## 2024-02-12 DIAGNOSIS — Z12.5 SCREENING FOR PROSTATE CANCER: Primary | ICD-10-CM

## 2024-02-12 DIAGNOSIS — E78.5 HYPERLIPIDEMIA, UNSPECIFIED HYPERLIPIDEMIA TYPE: ICD-10-CM

## 2024-02-12 DIAGNOSIS — N40.0 BENIGN PROSTATIC HYPERPLASIA WITHOUT LOWER URINARY TRACT SYMPTOMS: ICD-10-CM

## 2024-02-12 DIAGNOSIS — M54.12 CERVICAL RADICULOPATHY: ICD-10-CM

## 2024-02-12 DIAGNOSIS — E11.65 TYPE 2 DIABETES MELLITUS WITH HYPERGLYCEMIA, WITH LONG-TERM CURRENT USE OF INSULIN: ICD-10-CM

## 2024-02-12 DIAGNOSIS — T78.40XD ALLERGY, SUBSEQUENT ENCOUNTER: ICD-10-CM

## 2024-02-12 DIAGNOSIS — I10 ESSENTIAL HYPERTENSION: ICD-10-CM

## 2024-02-12 DIAGNOSIS — Z79.4 TYPE 2 DIABETES MELLITUS WITH HYPERGLYCEMIA, WITH LONG-TERM CURRENT USE OF INSULIN: ICD-10-CM

## 2024-02-12 PROCEDURE — 1160F RVW MEDS BY RX/DR IN RCRD: CPT | Performed by: NURSE PRACTITIONER

## 2024-02-12 PROCEDURE — 1159F MED LIST DOCD IN RCRD: CPT | Performed by: NURSE PRACTITIONER

## 2024-02-12 PROCEDURE — 3044F HG A1C LEVEL LT 7.0%: CPT | Performed by: NURSE PRACTITIONER

## 2024-02-12 PROCEDURE — 3074F SYST BP LT 130 MM HG: CPT | Performed by: NURSE PRACTITIONER

## 2024-02-12 PROCEDURE — 99214 OFFICE O/P EST MOD 30 MIN: CPT | Performed by: NURSE PRACTITIONER

## 2024-02-12 PROCEDURE — 3078F DIAST BP <80 MM HG: CPT | Performed by: NURSE PRACTITIONER

## 2024-02-12 RX ORDER — TAMSULOSIN HYDROCHLORIDE 0.4 MG/1
1 CAPSULE ORAL DAILY
Qty: 90 CAPSULE | Refills: 3 | Status: SHIPPED | OUTPATIENT
Start: 2024-02-12

## 2024-02-12 RX ORDER — GABAPENTIN 300 MG/1
300 CAPSULE ORAL 3 TIMES DAILY
Qty: 270 CAPSULE | Refills: 1 | Status: SHIPPED | OUTPATIENT
Start: 2024-02-12

## 2024-02-12 RX ORDER — CYCLOBENZAPRINE HCL 10 MG
10 TABLET ORAL 3 TIMES DAILY PRN
Qty: 270 TABLET | Refills: 1 | Status: SHIPPED | OUTPATIENT
Start: 2024-02-12

## 2024-02-12 RX ORDER — ASPIRIN 81 MG/1
81 TABLET ORAL DAILY
Qty: 90 TABLET | Refills: 3 | Status: SHIPPED | OUTPATIENT
Start: 2024-02-12

## 2024-02-12 RX ORDER — FEXOFENADINE HCL 180 MG/1
180 TABLET ORAL DAILY
Qty: 90 TABLET | Refills: 1 | Status: SHIPPED | OUTPATIENT
Start: 2024-02-12

## 2024-02-12 RX ORDER — FINASTERIDE 5 MG/1
5 TABLET, FILM COATED ORAL DAILY
Qty: 90 TABLET | Refills: 1 | Status: SHIPPED | OUTPATIENT
Start: 2024-02-12

## 2024-02-12 RX ORDER — ATORVASTATIN CALCIUM 40 MG/1
40 TABLET, FILM COATED ORAL DAILY
Qty: 90 TABLET | Refills: 1 | Status: SHIPPED | OUTPATIENT
Start: 2024-02-12

## 2024-02-12 RX ORDER — BLOOD SUGAR DIAGNOSTIC
1 STRIP MISCELLANEOUS 3 TIMES DAILY
Qty: 600 EACH | Refills: 1 | Status: SHIPPED | OUTPATIENT
Start: 2024-02-12

## 2024-02-12 RX ORDER — ESOMEPRAZOLE MAGNESIUM 40 MG/1
40 CAPSULE, DELAYED RELEASE ORAL EVERY 24 HOURS
Qty: 90 CAPSULE | Refills: 1 | Status: CANCELLED | OUTPATIENT
Start: 2024-02-12

## 2024-02-12 RX ORDER — ANTIOX #8/OM3/DHA/EPA/LUT/ZEAX 250-2.5 MG
1 CAPSULE ORAL DAILY
Qty: 90 CAPSULE | Refills: 3 | Status: SHIPPED | OUTPATIENT
Start: 2024-02-12

## 2024-02-12 RX ORDER — ALBUTEROL SULFATE 90 UG/1
2 AEROSOL, METERED RESPIRATORY (INHALATION) EVERY 4 HOURS PRN
Qty: 18 G | Refills: 5 | Status: SHIPPED | OUTPATIENT
Start: 2024-02-12

## 2024-02-12 RX ORDER — OMEPRAZOLE 40 MG/1
40 CAPSULE, DELAYED RELEASE ORAL DAILY
Qty: 90 CAPSULE | Refills: 1 | Status: CANCELLED | OUTPATIENT
Start: 2024-02-12

## 2024-02-12 RX ORDER — GLIPIZIDE 2.5 MG/1
2.5 TABLET, EXTENDED RELEASE ORAL EVERY 24 HOURS
Qty: 90 TABLET | Refills: 1 | Status: SHIPPED | OUTPATIENT
Start: 2024-02-12

## 2024-02-12 RX ORDER — SITAGLIPTIN AND METFORMIN HYDROCHLORIDE 1000; 50 MG/1; MG/1
2 TABLET, FILM COATED, EXTENDED RELEASE ORAL DAILY
Qty: 180 TABLET | Refills: 1 | Status: SHIPPED | OUTPATIENT
Start: 2024-02-12

## 2024-02-12 RX ORDER — MONTELUKAST SODIUM 10 MG/1
10 TABLET ORAL NIGHTLY
Qty: 90 TABLET | Refills: 1 | Status: SHIPPED | OUTPATIENT
Start: 2024-02-12

## 2024-02-12 RX ORDER — AMLODIPINE BESYLATE AND BENAZEPRIL HYDROCHLORIDE 10; 20 MG/1; MG/1
1 CAPSULE ORAL DAILY
Qty: 90 CAPSULE | Refills: 1 | Status: SHIPPED | OUTPATIENT
Start: 2024-02-12

## 2024-04-04 ENCOUNTER — TELEPHONE (OUTPATIENT)
Dept: FAMILY MEDICINE CLINIC | Facility: CLINIC | Age: 74
End: 2024-04-04
Payer: MEDICARE

## 2024-04-04 NOTE — TELEPHONE ENCOUNTER
Seema fierro wanting to verify if patient mulivitamin is suppose to be once a day or twice a day. Patient states he is taking twice a day. Aware PCP is out of office.

## 2024-04-09 ENCOUNTER — PRE-ADMISSION TESTING (OUTPATIENT)
Dept: PREADMISSION TESTING | Facility: HOSPITAL | Age: 74
End: 2024-04-09
Payer: MEDICARE

## 2024-04-09 VITALS
DIASTOLIC BLOOD PRESSURE: 74 MMHG | OXYGEN SATURATION: 98 % | SYSTOLIC BLOOD PRESSURE: 134 MMHG | HEIGHT: 71 IN | BODY MASS INDEX: 33.64 KG/M2 | HEART RATE: 65 BPM | WEIGHT: 240.3 LBS | RESPIRATION RATE: 16 BRPM

## 2024-04-09 LAB
ANION GAP SERPL CALCULATED.3IONS-SCNC: 10.7 MMOL/L (ref 5–15)
BUN SERPL-MCNC: 12 MG/DL (ref 8–23)
BUN/CREAT SERPL: 12.4 (ref 7–25)
CALCIUM SPEC-SCNC: 10 MG/DL (ref 8.6–10.5)
CHLORIDE SERPL-SCNC: 108 MMOL/L (ref 98–107)
CO2 SERPL-SCNC: 24.3 MMOL/L (ref 22–29)
CREAT SERPL-MCNC: 0.97 MG/DL (ref 0.76–1.27)
DEPRECATED RDW RBC AUTO: 49 FL (ref 37–54)
EGFRCR SERPLBLD CKD-EPI 2021: 80.9 ML/MIN/1.73
ERYTHROCYTE [DISTWIDTH] IN BLOOD BY AUTOMATED COUNT: 14.9 % (ref 12.3–15.4)
GLUCOSE SERPL-MCNC: 63 MG/DL (ref 65–99)
HCT VFR BLD AUTO: 44.7 % (ref 37.5–51)
HGB BLD-MCNC: 14.2 G/DL (ref 13–17.7)
MCH RBC QN AUTO: 28.2 PG (ref 26.6–33)
MCHC RBC AUTO-ENTMCNC: 31.8 G/DL (ref 31.5–35.7)
MCV RBC AUTO: 88.9 FL (ref 79–97)
PLATELET # BLD AUTO: 195 10*3/MM3 (ref 140–450)
PMV BLD AUTO: 11.2 FL (ref 6–12)
POTASSIUM SERPL-SCNC: 4.3 MMOL/L (ref 3.5–5.2)
RBC # BLD AUTO: 5.03 10*6/MM3 (ref 4.14–5.8)
SODIUM SERPL-SCNC: 143 MMOL/L (ref 136–145)
WBC NRBC COR # BLD AUTO: 5.47 10*3/MM3 (ref 3.4–10.8)

## 2024-04-09 PROCEDURE — 80048 BASIC METABOLIC PNL TOTAL CA: CPT | Performed by: OTOLARYNGOLOGY

## 2024-04-09 PROCEDURE — 85027 COMPLETE CBC AUTOMATED: CPT | Performed by: OTOLARYNGOLOGY

## 2024-04-09 PROCEDURE — 36415 COLL VENOUS BLD VENIPUNCTURE: CPT

## 2024-04-09 RX ORDER — MULTIPLE VITAMINS W/ MINERALS TAB 9MG-400MCG
1 TAB ORAL DAILY
COMMUNITY

## 2024-04-09 NOTE — PAT
Pt and spouse here for PAT visit.  Pre-op tests completed, chg soap given, and instructions reviewed.  Instructed clears until 2 hrs prior to arrival time and to bring cpap, voiced understanding. Pt stated has seen cardiology, Dr Darby, and cleared for surgery; called for records.

## 2024-04-09 NOTE — DISCHARGE INSTRUCTIONS
PRE-ADMISSION TESTING INSTRUCTIONS FOR ADULTS    Take these medications the morning of surgery with a small sip of water:   use inhaler and take omeprazole      Do not take any insulin or diabetes medications the morning of surgery.      No aspirin, advil, aleve, ibuprofen, naproxen, diet pills, decongestants, or herbal/vitamins for a week prior to surgery.   Hold prevagen, meloxicam, multivitamin and preservision as of today.    Tylenol/Acetaminophen is okay to take if needed.    General Instructions:    DO NOT EAT SOLID FOOD AFTER MIDNIGHT THE NIGHT BEFORE SURGERY. No gum, mints, or hard candy after midnight the night before surgery.  You may drink clear liquids the day of surgery up until 2 hours before your arrival time.  Clear liquids are liquids you can see through. Nothing RED in color.    Plain water    Sports drinks      Gelatin (Jell-O)  Fruit juices without pulp such as white grape juice and apple juice  Popsicles that contain no fruit or yogurt  Tea or coffee (no cream or milk added)    It is beneficial for you to have a clear drink that contains carbohydrates 2 hours before your arrival time.  We suggest a 20 ounce bottle of Gatorade or Powerade for non-diabetic patients or a 20 ounce bottle of Gatorade Zero or Powerade Zero for diabetic patients.     Patients who avoid smoking, chewing tobacco and alcohol for 4 weeks prior to surgery have a reduced risk of post-operative complications.  If at all possible, quit smoking as many days before surgery as you can.    Do not smoke, use chewing tobacco or drink alcohol the day of surgery    Bring your C-PAP/ BI-PAP machine if you use one.  Wear clean comfortable clothes.  Do not wear contact lenses, lotion, deodorant, or make-up.  Bring a case for your glasses if applicable. You may brush your teeth the morning of surgery.  You may wear dentures/partials, do not put adhesive/glue on them.  Leave all other jewelry and valuables at home.      Preventing a  Surgical Site Infection:    Shower the night before and on the morning of surgery using the chlorhexidine soap you were given.  Use a clean washcloth with the soap.  Place clean sheets on your bed after showering the night before surgery. Do not use the CHG soap on your hair, face, or private areas. Wash your body gently for five (5) minutes. Do not scrub your skin.  Dry with a clean towel and dress in clean clothing.  Do not shave the surgical area for 10 days-2 weeks prior to surgery  because the razor can irritate skin and make it easier to develop an infection.  Make sure you, your family, and all healthcare providers clean their hands with soap and water or an alcohol based hand  before caring for you or your wound.      Day of surgery:    Your surgeon’s office will advise you of your arrival time for the day of surgery.    Upon arrival, a Pre-op nurse and Anesthesia provider will review your health history, obtain vital signs, and answer questions you may have. The anesthesia provider will also discuss the type of anesthesia that will be needed for your procedure, which may include general anesthesia. The only belongings needed at this time will be your home medications and if applicable your C-PAP/BI-PAP machine.  If you are staying overnight your family can leave the rest of your belongings in the car and bring them to your room later.  A Pre-op nurse will start an IV and you may receive medication in preparation for surgery, including something to help you relax.  Your family will be able to see you in the Pre-op area.  While you are in surgery your family should notify the waiting room  if they leave the waiting room area and provide a contact phone number.    IF you have any questions, you can call the Pre-Admission Department at [phone] or your surgeon's office.  Notify your surgeon if  you become sick, have a fever, productive cough, or cannot be here the day of  surgery    Please be aware that surgery does come with discomfort.  We want to make every effort to control your discomfort so please discuss any uncontrolled symptoms with your nurse.   Your doctor will most likely have prescribed pain medications.      If you are going home after surgery, you will receive individualized written care instructions before being discharged.  A responsible adult (over the age of 18) must drive you to and from the hospital on the day of your surgery and stay with you for 24 hours after anesthesia.    If you are staying overnight following surgery, you will be transported to your hospital room following the recovery period.  [facility] has all private rooms.    You may receive a survey regarding the care you received. Your feedback is very important and will be used to collect the necessary data to help us to continue to provide excellent care.     Deductibles and co-payments are collected on the day of service. Please be prepared to pay the required co-pay, deductible or deposit on the day of service as defined by your plan.      Inspire Nerve Stimulator Implantation, Care After  This sheet gives you information about how to care for yourself after your procedure. Your health care provider may also give you more specific instructions. If you have problems or questions, contact your health care provider.  What can I expect after the procedure?  After your procedure, it is common to have soreness or pain in the incision area.  Follow these instructions at home:  Medicines  Take over-the-counter and prescription medicines only as told by your health care provider.  If you were prescribed an antibiotic medicine, take it as told by your health care provider. Do not stop using the antibiotic even if you start to feel better.  Incision care           Follow instructions from your health care provider about how to take care of your incision. Make sure you:  Wash your hands with  soap and water before and after you change your bandage (dressing). If soap and water are not available, use hand .  Change your dressing as told by your health care provider.  Leave stitches (sutures), skin glue, or adhesive strips in place. These skin closures may need to stay in place for 2 weeks or longer. If adhesive strip edges start to loosen and curl up, you may trim the loose edges. Do not remove adhesive strips completely unless your health care provider tells you to do that.  Check your incision area every day for signs of infection. Check for:  Redness, swelling, or more pain.  Fluid or blood.  Warmth.  Pus or a bad smell.  Activity  Return to your normal activities as told by your health care provider. Ask your health care provider what activities are safe for you.  Follow instructions from your health care provider about any activity restrictions. You may need to avoid:  Bending, twisting, or stretching.  Having sex.  Activities that require arm/shoulder motion for 2 weeks.  Strenuous activity (4 weeks)  ex: weight-lifting, golfing, running, jogging, skiing, ect.   Do not lift anything that is heavier than 10 lb (4.5 kg), or the limit that you are told, until your health care provider says that it is safe.  Keep arm in sling for 24 hours  Perform neck rolls 3x/day:10 clockwise, 10 counterclockwise.  Bathing  Do not take baths, swim, or use a hot tub until your health care provider approves. Ask your health care provider if you may take showers. You may only be allowed to take sponge baths.  Keep the dressing dry until your health care provider says it can be removed.  Using the Inspire nerve stimulator  You will be given a remote control device. This device will allow you to turn your Inspire nerve stimulator on and off. Follow instructions from your health care provider about how to use this device.  Tell all of your health care providers that you have this device. Remind them that you have  the device before they do any tests or procedures.  General instructions  Do not drive for 24 hours, you were given anesthesia during your procedure.  Do not drive or use heavy machinery while taking prescription pain medicine.  Follow your health care providers instructions on shaving facial hair.  Women should wear a comfortable brassier for 4 weeks.  Keep all follow-up visits as told by your health care provider. This is important. Your health care provider may need to adjust the stimulator over several visits until it works well for you.  Stimulator tuning visit @ 4 weeks, Fine tune sleep study @ 12 weeks  Contact a health care provider if:  Your device stops working.  The device is not helping your symptoms.  You have a fever or chills.  You have pus or a bad smell coming from your incision.  You have redness, swelling, or more pain around your incision.  You have fluid or blood coming from your incision.  Your incision feels warm to the touch.  Summary  After your procedure, it is common to have soreness or pain in the incision area.  Follow instructions from your health care provider about how to take care of your incision. Also, follow instructions about any activity restrictions. You may need to avoid activities that involve a lot of bending, twisting, or bouncing.  Tell all of your health care providers that you have this device. Remind them that you have the device before they do any tests or procedures.  Contact your health care provider if your device stops working, or if it is not helping to treat your symptoms.  Contact your health care provider if you have a fever, or if there is redness, warmth, swelling, pain, pus, or a bad smell in or around the incision.  This information is not intended to replace advice given to you by your health care provider. Make sure you discuss any questions you have with your health care provider.  Document Revised: 02/03/2020 Document Reviewed: 02/03/2020 Document  adapted for Inspire Stimulator by KARIN 4/2021  Elsevier Patient Education © 2021 [employer]

## 2024-04-16 ENCOUNTER — ANESTHESIA EVENT (OUTPATIENT)
Dept: PERIOP | Facility: HOSPITAL | Age: 74
End: 2024-04-16
Payer: MEDICARE

## 2024-04-17 ENCOUNTER — ANESTHESIA (OUTPATIENT)
Dept: PERIOP | Facility: HOSPITAL | Age: 74
End: 2024-04-17
Payer: MEDICARE

## 2024-04-17 ENCOUNTER — HOSPITAL ENCOUNTER (OUTPATIENT)
Facility: HOSPITAL | Age: 74
Setting detail: HOSPITAL OUTPATIENT SURGERY
Discharge: HOME | End: 2024-04-17
Attending: OTOLARYNGOLOGY | Admitting: OTOLARYNGOLOGY
Payer: MEDICARE

## 2024-04-17 VITALS
HEART RATE: 71 BPM | DIASTOLIC BLOOD PRESSURE: 82 MMHG | WEIGHT: 235.2 LBS | OXYGEN SATURATION: 95 % | SYSTOLIC BLOOD PRESSURE: 122 MMHG | RESPIRATION RATE: 16 BRPM | TEMPERATURE: 98 F | BODY MASS INDEX: 33.27 KG/M2

## 2024-04-17 DIAGNOSIS — G47.33 OBSTRUCTIVE SLEEP APNEA: Primary | ICD-10-CM

## 2024-04-17 LAB — GLUCOSE BLDC GLUCOMTR-MCNC: 103 MG/DL (ref 70–130)

## 2024-04-17 PROCEDURE — 25010000002 ONDANSETRON PER 1 MG: Performed by: NURSE ANESTHETIST, CERTIFIED REGISTERED

## 2024-04-17 PROCEDURE — C1778 LEAD, NEUROSTIMULATOR: HCPCS | Performed by: OTOLARYNGOLOGY

## 2024-04-17 PROCEDURE — 25810000003 LACTATED RINGERS PER 1000 ML: Performed by: NURSE ANESTHETIST, CERTIFIED REGISTERED

## 2024-04-17 PROCEDURE — 25010000002 DEXAMETHASONE PER 1 MG: Performed by: NURSE ANESTHETIST, CERTIFIED REGISTERED

## 2024-04-17 PROCEDURE — C1767 GENERATOR, NEURO NON-RECHARG: HCPCS | Performed by: OTOLARYNGOLOGY

## 2024-04-17 PROCEDURE — 25010000002 PROPOFOL 200 MG/20ML EMULSION

## 2024-04-17 PROCEDURE — 82948 REAGENT STRIP/BLOOD GLUCOSE: CPT

## 2024-04-17 PROCEDURE — 25010000002 MIDAZOLAM PER 1MG: Performed by: NURSE ANESTHETIST, CERTIFIED REGISTERED

## 2024-04-17 PROCEDURE — C1787 PATIENT PROGR, NEUROSTIM: HCPCS | Performed by: OTOLARYNGOLOGY

## 2024-04-17 PROCEDURE — 25010000002 FENTANYL CITRATE (PF) 50 MCG/ML SOLUTION

## 2024-04-17 PROCEDURE — 25010000002 CEFAZOLIN PER 500 MG: Performed by: OTOLARYNGOLOGY

## 2024-04-17 PROCEDURE — 25010000002 SUCCINYLCHOLINE PER 20 MG

## 2024-04-17 DEVICE — GEN IPG INSPIRE4 RESP/SENSR/LD NONRECHG: Type: IMPLANTABLE DEVICE | Site: NECK | Status: FUNCTIONAL

## 2024-04-17 DEVICE — THE INSPIRE® STIMULATION LEAD (MODEL 4063) IS DESIGNED TO DELIVER STIMULATION TO THE HYPOGLOSSAL NERVE FOR THE TREATMENT OF OBSTRUCTIVE SLEEP APNEA. THE LEAD FEATURES A FLEXIBLE, SELF-SIZING CUFF. ELECTRODES IN THE INNER SURFACE OF THE CUFF DELIVER STIMULATION TO THE NERVE. THE LEAD INCORPORATES A STANDARD CONNECTOR FOR COUPLING TO THE INSPIRE IMPLANTABLE PULSE GENERATOR (IPG).
Type: IMPLANTABLE DEVICE | Site: NECK | Status: FUNCTIONAL
Brand: INSPIRE

## 2024-04-17 DEVICE — THE INSPIRE® RESPIRATORY SENSING LEAD (MODEL 4340) IS DESIGNED TO DETECT RESPIRATORY EFFORT. THE LEAD FEATURES A PRESSURE SENSITIVE MEMBRANE THAT CONVERTS THE MECHANICAL ENERGY OF RESPIRATION INTO AN ELECTRICAL SIGNAL. THE LEAD INCORPORATES A STANDARD CONNECTOR FOR COUPLING TO THE INSPIRE IMPLANTABLE PULSE GENERATOR (IPG) FOR TREATMENT OF OBSTRUCTIVE SLEEP APNEA.
Type: IMPLANTABLE DEVICE | Site: NECK | Status: FUNCTIONAL
Brand: INSPIRE

## 2024-04-17 RX ORDER — FENTANYL CITRATE 50 UG/ML
25 INJECTION, SOLUTION INTRAMUSCULAR; INTRAVENOUS
Status: DISCONTINUED | OUTPATIENT
Start: 2024-04-17 | End: 2024-04-17 | Stop reason: HOSPADM

## 2024-04-17 RX ORDER — SUCCINYLCHOLINE CHLORIDE 20 MG/ML
INJECTION INTRAMUSCULAR; INTRAVENOUS AS NEEDED
Status: DISCONTINUED | OUTPATIENT
Start: 2024-04-17 | End: 2024-04-17 | Stop reason: SURG

## 2024-04-17 RX ORDER — OXYCODONE HYDROCHLORIDE AND ACETAMINOPHEN 5; 325 MG/1; MG/1
1 TABLET ORAL ONCE AS NEEDED
Status: COMPLETED | OUTPATIENT
Start: 2024-04-17 | End: 2024-04-17

## 2024-04-17 RX ORDER — DEXAMETHASONE SODIUM PHOSPHATE 4 MG/ML
4 INJECTION, SOLUTION INTRA-ARTICULAR; INTRALESIONAL; INTRAMUSCULAR; INTRAVENOUS; SOFT TISSUE ONCE AS NEEDED
Status: COMPLETED | OUTPATIENT
Start: 2024-04-17 | End: 2024-04-17

## 2024-04-17 RX ORDER — ONDANSETRON 2 MG/ML
4 INJECTION INTRAMUSCULAR; INTRAVENOUS ONCE AS NEEDED
Status: DISCONTINUED | OUTPATIENT
Start: 2024-04-17 | End: 2024-04-17 | Stop reason: HOSPADM

## 2024-04-17 RX ORDER — SODIUM CHLORIDE, SODIUM LACTATE, POTASSIUM CHLORIDE, CALCIUM CHLORIDE 600; 310; 30; 20 MG/100ML; MG/100ML; MG/100ML; MG/100ML
100 INJECTION, SOLUTION INTRAVENOUS ONCE
Status: DISCONTINUED | OUTPATIENT
Start: 2024-04-17 | End: 2024-04-17 | Stop reason: HOSPADM

## 2024-04-17 RX ORDER — LIDOCAINE HYDROCHLORIDE 20 MG/ML
INJECTION, SOLUTION INFILTRATION; PERINEURAL AS NEEDED
Status: DISCONTINUED | OUTPATIENT
Start: 2024-04-17 | End: 2024-04-17 | Stop reason: SURG

## 2024-04-17 RX ORDER — SODIUM CHLORIDE 0.9 % (FLUSH) 0.9 %
10 SYRINGE (ML) INJECTION AS NEEDED
Status: DISCONTINUED | OUTPATIENT
Start: 2024-04-17 | End: 2024-04-17 | Stop reason: HOSPADM

## 2024-04-17 RX ORDER — ONDANSETRON 2 MG/ML
4 INJECTION INTRAMUSCULAR; INTRAVENOUS ONCE AS NEEDED
Status: COMPLETED | OUTPATIENT
Start: 2024-04-17 | End: 2024-04-17

## 2024-04-17 RX ORDER — FENTANYL CITRATE 50 UG/ML
INJECTION, SOLUTION INTRAMUSCULAR; INTRAVENOUS AS NEEDED
Status: DISCONTINUED | OUTPATIENT
Start: 2024-04-17 | End: 2024-04-17 | Stop reason: SURG

## 2024-04-17 RX ORDER — FAMOTIDINE 10 MG/ML
20 INJECTION, SOLUTION INTRAVENOUS
Status: COMPLETED | OUTPATIENT
Start: 2024-04-17 | End: 2024-04-17

## 2024-04-17 RX ORDER — PROPOFOL 10 MG/ML
INJECTION, EMULSION INTRAVENOUS AS NEEDED
Status: DISCONTINUED | OUTPATIENT
Start: 2024-04-17 | End: 2024-04-17 | Stop reason: SURG

## 2024-04-17 RX ORDER — SODIUM CHLORIDE 0.9 % (FLUSH) 0.9 %
10 SYRINGE (ML) INJECTION EVERY 12 HOURS SCHEDULED
Status: DISCONTINUED | OUTPATIENT
Start: 2024-04-17 | End: 2024-04-17 | Stop reason: HOSPADM

## 2024-04-17 RX ORDER — LIDOCAINE HYDROCHLORIDE AND EPINEPHRINE 10; 10 MG/ML; UG/ML
INJECTION, SOLUTION INFILTRATION; PERINEURAL AS NEEDED
Status: DISCONTINUED | OUTPATIENT
Start: 2024-04-17 | End: 2024-04-17 | Stop reason: HOSPADM

## 2024-04-17 RX ORDER — KETAMINE HYDROCHLORIDE 10 MG/ML
INJECTION, SOLUTION INTRAMUSCULAR; INTRAVENOUS AS NEEDED
Status: DISCONTINUED | OUTPATIENT
Start: 2024-04-17 | End: 2024-04-17 | Stop reason: SURG

## 2024-04-17 RX ORDER — SODIUM CHLORIDE, SODIUM LACTATE, POTASSIUM CHLORIDE, CALCIUM CHLORIDE 600; 310; 30; 20 MG/100ML; MG/100ML; MG/100ML; MG/100ML
9 INJECTION, SOLUTION INTRAVENOUS CONTINUOUS PRN
Status: DISCONTINUED | OUTPATIENT
Start: 2024-04-17 | End: 2024-04-17 | Stop reason: HOSPADM

## 2024-04-17 RX ORDER — MIDAZOLAM HYDROCHLORIDE 2 MG/2ML
0.5 INJECTION, SOLUTION INTRAMUSCULAR; INTRAVENOUS
Status: DISCONTINUED | OUTPATIENT
Start: 2024-04-17 | End: 2024-04-17 | Stop reason: HOSPADM

## 2024-04-17 RX ORDER — SODIUM CHLORIDE 9 MG/ML
40 INJECTION, SOLUTION INTRAVENOUS AS NEEDED
Status: DISCONTINUED | OUTPATIENT
Start: 2024-04-17 | End: 2024-04-17 | Stop reason: HOSPADM

## 2024-04-17 RX ORDER — HYDROCODONE BITARTRATE AND ACETAMINOPHEN 7.5; 325 MG/1; MG/1
1 TABLET ORAL EVERY 6 HOURS PRN
Qty: 20 TABLET | Refills: 0 | Status: SHIPPED | OUTPATIENT
Start: 2024-04-17

## 2024-04-17 RX ORDER — MAGNESIUM HYDROXIDE 1200 MG/15ML
LIQUID ORAL AS NEEDED
Status: DISCONTINUED | OUTPATIENT
Start: 2024-04-17 | End: 2024-04-17 | Stop reason: HOSPADM

## 2024-04-17 RX ADMIN — KETAMINE HYDROCHLORIDE 30 MG: 10 INJECTION, SOLUTION INTRAMUSCULAR; INTRAVENOUS at 09:50

## 2024-04-17 RX ADMIN — CEFAZOLIN 2000 MG: 2 INJECTION, POWDER, FOR SOLUTION INTRAVENOUS at 09:53

## 2024-04-17 RX ADMIN — FAMOTIDINE 20 MG: 10 INJECTION, SOLUTION INTRAVENOUS at 08:49

## 2024-04-17 RX ADMIN — PROPOFOL 40 MG: 10 INJECTION, EMULSION INTRAVENOUS at 11:15

## 2024-04-17 RX ADMIN — ONDANSETRON 4 MG: 2 INJECTION INTRAMUSCULAR; INTRAVENOUS at 08:49

## 2024-04-17 RX ADMIN — DEXAMETHASONE SODIUM PHOSPHATE 4 MG: 4 INJECTION, SOLUTION INTRAMUSCULAR; INTRAVENOUS at 08:49

## 2024-04-17 RX ADMIN — KETAMINE HYDROCHLORIDE 20 MG: 10 INJECTION, SOLUTION INTRAMUSCULAR; INTRAVENOUS at 10:22

## 2024-04-17 RX ADMIN — SODIUM CHLORIDE, POTASSIUM CHLORIDE, SODIUM LACTATE AND CALCIUM CHLORIDE 9 ML/HR: 600; 310; 30; 20 INJECTION, SOLUTION INTRAVENOUS at 08:49

## 2024-04-17 RX ADMIN — LIDOCAINE HYDROCHLORIDE 100 MG: 20 INJECTION, SOLUTION INFILTRATION; PERINEURAL at 09:50

## 2024-04-17 RX ADMIN — FENTANYL CITRATE 50 MCG: 50 INJECTION, SOLUTION INTRAMUSCULAR; INTRAVENOUS at 09:50

## 2024-04-17 RX ADMIN — PROPOFOL 160 MG: 10 INJECTION, EMULSION INTRAVENOUS at 09:50

## 2024-04-17 RX ADMIN — SUCCINYLCHOLINE CHLORIDE 180 MG: 20 INJECTION, SOLUTION INTRAMUSCULAR; INTRAVENOUS at 09:50

## 2024-04-17 RX ADMIN — MIDAZOLAM HYDROCHLORIDE 0.5 MG: 1 INJECTION, SOLUTION INTRAMUSCULAR; INTRAVENOUS at 09:33

## 2024-04-17 RX ADMIN — OXYCODONE HYDROCHLORIDE AND ACETAMINOPHEN 1 TABLET: 5; 325 TABLET ORAL at 13:13

## 2024-04-17 RX ADMIN — FENTANYL CITRATE 25 MCG: 50 INJECTION, SOLUTION INTRAMUSCULAR; INTRAVENOUS at 10:51

## 2024-04-17 NOTE — ANESTHESIA PREPROCEDURE EVALUATION
Anesthesia Evaluation     Patient summary reviewed and Nursing notes reviewed   NPO Solid Status: > 8 hours  NPO Liquid Status: > 4 hours           Airway   Mallampati: III  TM distance: >3 FB  Neck ROM: full  Possible difficult intubation and Large neck circumference  Dental - normal exam     Pulmonary - normal exam   (+) a smoker Former, COPD (albuterol as needed, used as instructed this am),sleep apnea on CPAP  Cardiovascular - normal exam  Exercise tolerance: good (4-7 METS)    ECG reviewed  Rhythm: regular  Rate: normal    (+) hypertension well controlled 2 medications or greater, hyperlipidemia  Dysrhythmias: LBBB.    ROS comment: Cleared by cardiology.    Neuro/PsychNumbness: cervical radiculpathy.  GI/Hepatic/Renal/Endo    (+) GERD well controlled, diabetes mellitus (SPF734) type 2 using insulin    Musculoskeletal     (+) neck pain  Abdominal   (+) obese   Substance History - negative use     OB/GYN negative ob/gyn ROS         Other        ROS/Med Hx Other: Black coffee 0530, small amount powerade zero                Anesthesia Plan    ASA 3     general     intravenous induction     Anesthetic plan, risks, benefits, and alternatives have been provided, discussed and informed consent has been obtained with: patient, spouse/significant other and child.    Use of blood products discussed with patient, spouse/significant other and child  Consented to blood products.      CODE STATUS:

## 2024-04-17 NOTE — ANESTHESIA PROCEDURE NOTES
Airway  Urgency: elective    Date/Time: 4/17/2024 9:52 AM  Airway not difficult    General Information and Staff    Patient location during procedure: OR  CRNA/CAA: Abi Zhao CRNA    Indications and Patient Condition  Indications for airway management: airway protection    Preoxygenated: yes  Mask difficulty assessment: 2 - vent by mask + OA or adjuvant +/- NMBA    Final Airway Details  Final airway type: endotracheal airway      Successful airway: ETT  Cuffed: yes   Successful intubation technique: video laryngoscopy  Facilitating devices/methods: intubating stylet  Endotracheal tube insertion site: oral  Blade: Mensah  Blade size: 4  ETT size (mm): 7.5  Cormack-Lehane Classification: grade I - full view of glottis  Placement verified by: chest auscultation and capnometry   Measured from: lips  Number of attempts at approach: 1  Assessment: lips, teeth, and gum same as pre-op and atraumatic intubation

## 2024-04-17 NOTE — H&P
[facility]   PREOPERATIVE HISTORY AND PHYSICAL    Patient Name:Taqueria Soto  : 1950  MRN: [id number]  Primary Care Physician: Edgar Briscoe APRN  Date of admission: 2024    Subjective   Subjective     Chief Complaint: preoperative evaluation    HPI  Taqueria Soto is a 74 y.o. male who presents for preoperative evaluation. He is scheduled for INSERTION OF HYPOGLOSSAL NERVE NEUROSTIMULATOR ELECTRODE AND GENERATOR AND BREATHING SENSORE ELECTRODE (N/A).       Personal History     Past Medical History:   Diagnosis Date    Acid reflux     Arthritis     BPH (benign prostatic hyperplasia)     Cataract     h/o    Cervical radiculopathy 2019    follows /[facility]    Cervicalgia 2019    COPD (chronic obstructive pulmonary disease)     Dr Dela Cruz follows    Deafness     hearing aids    Diabetes mellitus, type 2 2018    Essential hypertension 2018    Facet arthritis of cervical region 2019    Hematuria, microscopic     Hemorrhoids     High cholesterol     Hyperlipidemia 2018    Insulin dependent type 2 diabetes mellitus, controlled     Left bundle branch block     cleared by Dr Darby (cardiology)    Macular degeneration, bilateral     follows w/[facility] (Dr Marcum), gets injections    Prostatitis     Unspecified    Shortness of breath     Sinus trouble     Sleep apnea     uses CPAP       Past Surgical History:   Procedure Laterality Date    CATARACT EXTRACTION WITH INTRAOCULAR LENS IMPLANT Bilateral     EPIDURAL BLOCK      NOSE SURGERY      TONSILLECTOMY  1960       Family History: His family history includes Arthritis in his father; Diabetes in his sister; No Known Problems in his mother; Sleep apnea in his father.     Social History: He  reports that he quit smoking about 12 years ago. His smoking use included cigarettes. He started smoking about 62 years ago. He has a 50 pack-year smoking history. He has never used smokeless tobacco. He reports current alcohol use. He  reports that he does not use drugs.    Home Medications:  Apoaequorin, Insulin Glargine, Pen Needles, SITagliptin-metFORMIN HCl ER, albuterol sulfate HFA, amLODIPine-benazepril, aspirin, atorvastatin, brimonidine, carboxymethylcellulose sod (PF), cyclobenzaprine, fexofenadine, finasteride, gabapentin, glipizide, glucose blood, meloxicam, montelukast, multivitamin with minerals, multivitamins-minerals, ofloxacin, omeprazole, tamsulosin, and timolol    Allergies:  He is allergic to nitroglycerin.    Objective    Objective     Vitals:    Temp:  [98.4 °F (36.9 °C)] 98.4 °F (36.9 °C)  Heart Rate:  [75] 75  Resp:  [17] 17  BP: (169)/(130) 169/130  ENT Physical Exam: Neck and chest exam clear  Assessment & Plan   Assessment / Plan     Brief Patient Summary:  Taqueria Soto is a 74 y.o. male who presents for preoperative evaluation.    * No Diagnosis Codes entered *    Active Hospital Problems:  There are no active hospital problems to display for this patient.    Plan:   Procedure(s):  INSERTION OF HYPOGLOSSAL NERVE NEUROSTIMULATOR ELECTRODE AND GENERATOR AND BREATHING SENSORE ELECTRODE    The risks, benefits, and alternatives of the procedure including but not limited to pain, numbness, nerve injury, scarring, bleeding, infection, persistent symptoms, failure of the device to function and risks of the anesthesia were discussed full with the patient and questions were answered. No guarantees were made or implied.  Patient understands that weakness of the tongue can result from normal and expected surgical manipulation for cuff placement.    Mauri Vang MD

## 2024-04-17 NOTE — ANESTHESIA POSTPROCEDURE EVALUATION
Patient: Taqueria Soto    Procedure Summary       Date: 04/17/24 Room / Location:  [facility] OR 3 / [facility]    Anesthesia Start: 0943 Anesthesia Stop: 1230    Procedure: INSERTION OF HYPOGLOSSAL NERVE NEUROSTIMULATOR ELECTRODE AND GENERATOR AND BREATHING SENSORE ELECTRODE Diagnosis: (G47.33)    Surgeons: Mauri Vang MD Provider: Abi Zhao CRNA    Anesthesia Type: general ASA Status: 3            Anesthesia Type: general    Vitals  Vitals Value Taken Time   /74 04/17/24 1322   Temp 98 °F (36.7 °C) 04/17/24 1322   Pulse 69 04/17/24 1322   Resp 18 04/17/24 1322   SpO2 94 % 04/17/24 1322           Post Anesthesia Care and Evaluation    Patient location during evaluation: PHASE II  Patient participation: complete - patient participated  Level of consciousness: awake  Pain management: adequate    Airway patency: patent  Anesthetic complications: No anesthetic complications  PONV Status: none  Cardiovascular status: acceptable  Respiratory status: acceptable  Hydration status: acceptable

## 2024-04-17 NOTE — OP NOTE
Preop diagnosis: Moderate obstructive sleep apnea intolerant of positive pressure therapy    Postop diagnosis: Same    Procedure: 12th cranial nerve stimulation implant (CPT 03384), with placement of chest wall respiratory sensor (CPT 0466T)    Anesthesia General    Assistant:  Kriss DORADO    Blood loss 10 mL    Complications: None    Indications:    Description.  Patient was placed supine on the operating table where general anesthetic was administered.  Patient was positioned and draped in usual manner for hypoglossal nerve stimulation implant.     Shoulder roll was placed.  Electrodes were placed into the genioglossus and hyoglossus muscles and additional grounding lead was placed.  These were tested and noted to be appropriately positioned.     Sterile prep and drape was completed after marking of our incisions with ink.     Neck incision was made with a #15 blade.  Platysma was divided meticulously to avoid injury to the marginal branch of the facial nerve.  Once the platysma was divided we divided deep cervical fascia quite inferiorly below the margin of the 70 of the gland.  We reflected this fascia superiorly and then dissected the gland away from the digastric tendon.  We identified the digastric tendon and passed a loop under the tendon to retract it inferiorly.  We then identified the border of the mylohyoid muscle which we cleared of fascia.  We retracted the mylohyoid anteriorly.     A sizable vein was identified just inferior to the hypoglossal nerve.  We placed a vessel loop under this vein and retracted it inferiorly.  We then gently dissected fascia off the hypoglossal nerve.     Once the hypoglossal nerve was identified we used the Nam stimulator to identify appropriate branches to the genioglossus, transverse, and vertical muscles.  We then appropriately placed the cuff electrode for the hypoglossal nerve stimulator we tested the included branches several times before making our final  placement.  We flushed the electrode with saline.  We secured the electrode using the security cuff and sewed this to the digastric tendon with 2 separate 3-0 silk sutures.     A separate 5 cm incision was made in the right upper chest and dissection was carried quickly down to the pectoralis fascia.  We placed two 2-0 silk stay sutures appropriately for securing of the RPG.     We then divided the pectoralis fascia and  pectoralis muscle fibers down to external oblique.  We identified the junction of external and internal oblique.  We made a small window into the external oblique and then passed the respiratory sensor lead easily without difficulty into the space between external and internal oblique.  We then secured this lead to the external oblique fascia with 3 separate 3-0 silk sutures.  We brought the lead out between the pectoralis fibers and secured the second security point to the pectoralis fascia with two 3-0 silk sutures.     The stimulation lead was then tunneled safely in the subplatysmal plane from the upper to the lower incision.  We then placed both leads into the pulse generator and made certain that they were appropriately positioned.  We then placed the pulse generator into the pectoralis pocket.     We performed diagnostic testing on both the sensor and stimulation leads and testing was appropriate indicating good function of the device.     Once this was complete we irrigated all wounds copiously with saline and closed in 3 layers with Monocryl and then the skin with 5-0 nylon.  Appropriate dressings were applied.  Patient was awakened and return to recovery in dictation.     Note: The assistant was necessary for the technical portions of this case and was present during the entire case.  The assistant participated in closure of the wound and was necessary for retraction, implant placement, and security of the implant components during suturing and other maneuvers.  The procedure  could not have been completed safely without the assistant present.

## 2024-05-13 ENCOUNTER — OFFICE VISIT (OUTPATIENT)
Dept: PULMONOLOGY | Facility: CLINIC | Age: 74
End: 2024-05-13
Payer: MEDICARE

## 2024-05-13 VITALS
BODY MASS INDEX: 32.48 KG/M2 | DIASTOLIC BLOOD PRESSURE: 73 MMHG | WEIGHT: 232 LBS | TEMPERATURE: 98.2 F | SYSTOLIC BLOOD PRESSURE: 123 MMHG | HEIGHT: 71 IN | RESPIRATION RATE: 18 BRPM | OXYGEN SATURATION: 96 % | HEART RATE: 62 BPM

## 2024-05-13 DIAGNOSIS — J41.8 MIXED SIMPLE AND MUCOPURULENT CHRONIC BRONCHITIS: Primary | ICD-10-CM

## 2024-05-13 DIAGNOSIS — G47.33 OBSTRUCTIVE SLEEP APNEA: ICD-10-CM

## 2024-05-13 PROCEDURE — 99213 OFFICE O/P EST LOW 20 MIN: CPT | Performed by: INTERNAL MEDICINE

## 2024-05-13 PROCEDURE — 3078F DIAST BP <80 MM HG: CPT | Performed by: INTERNAL MEDICINE

## 2024-05-13 PROCEDURE — 3074F SYST BP LT 130 MM HG: CPT | Performed by: INTERNAL MEDICINE

## 2024-05-13 PROCEDURE — 1159F MED LIST DOCD IN RCRD: CPT | Performed by: INTERNAL MEDICINE

## 2024-05-13 PROCEDURE — 1160F RVW MEDS BY RX/DR IN RCRD: CPT | Performed by: INTERNAL MEDICINE

## 2024-05-13 RX ORDER — INSULIN GLARGINE-YFGN 100 [IU]/ML
INJECTION, SOLUTION SUBCUTANEOUS
COMMUNITY
Start: 2024-05-08

## 2024-05-13 NOTE — PROGRESS NOTES
Pulmonary Office Follow-up    Subjective     Taqueria Soto is seen today at the office for   Chief Complaint   Patient presents with    COPD    Follow-up     6 Month          HPI  Taqueria Soto is a 74 y.o. male with a PMH significant for COPD and obstructive sleep apnea presents for follow-up patient has been doing well and denies any significant cough wheeze or expectoration he has been exercising some and has reduced his weight he denies any calf pain or swelling      Tobacco use history:  Former smoker      Patient Active Problem List   Diagnosis    Type 1 diabetes mellitus    Hyperlipidemia    Essential hypertension    Benign prostatic hyperplasia    Cervical radiculopathy    Diabetes mellitus without complication    High blood pressure    Chronic obstructive pulmonary disease    Hypertensive heart disease without congestive heart failure    Hemorrhoids    Gastroesophageal reflux disease    DDD (degenerative disc disease), cervical    Cervical spondylosis without myelopathy       Review of Systems  Review of Systems   All other systems reviewed and are negative.    As described in the HPI. Otherwise, remainder of ROS (14 systems) were negative.    Medications, Allergies, Social, and Family Histories reviewed as per EMR.    Result Review :            Objective     Vitals:    05/13/24 0842   BP: 123/73   Pulse: 62   Resp: 18   Temp: 98.2 °F (36.8 °C)   SpO2: 96%         05/13/24  0842   Weight: 105 kg (232 lb)       Physical Exam  Vitals and nursing note reviewed.   Constitutional:       Appearance: Normal appearance.   HENT:      Head: Normocephalic and atraumatic.      Nose: Nose normal.      Mouth/Throat:      Mouth: Mucous membranes are moist.      Pharynx: Oropharynx is clear.   Eyes:      Extraocular Movements: Extraocular movements intact.      Conjunctiva/sclera: Conjunctivae normal.      Pupils: Pupils are equal, round, and reactive to light.   Cardiovascular:      Rate and Rhythm: Normal rate and  regular rhythm.      Pulses: Normal pulses.      Heart sounds: Normal heart sounds.   Pulmonary:      Effort: Pulmonary effort is normal.      Breath sounds: Normal breath sounds.   Abdominal:      General: Abdomen is flat. Bowel sounds are normal.      Palpations: Abdomen is soft.   Musculoskeletal:         General: Normal range of motion.      Cervical back: Normal range of motion and neck supple.   Skin:     General: Skin is warm.      Capillary Refill: Capillary refill takes 2 to 3 seconds.   Neurological:      General: No focal deficit present.      Mental Status: He is alert and oriented to person, place, and time.   Psychiatric:         Mood and Affect: Mood normal.         Behavior: Behavior normal.         No radiology results for the last 90 days.     Assessment & Plan     Diagnoses and all orders for this visit:    1. Mixed simple and mucopurulent chronic bronchitis (Primary)    2. Obstructive sleep apnea         Discussion/ Recommendations:   Patient is advised to continue reducing his weight and regular exercise his BMI is 32.82  Continue home medications  Patient has already quit smoking  Vaccinations discussed and recommended             Return in about 3 months (around 8/13/2024).          This document has been electronically signed by Rickey Malone MD on May 13, 2024 08:51 EDT

## 2024-05-20 DIAGNOSIS — N40.0 BENIGN PROSTATIC HYPERPLASIA WITHOUT LOWER URINARY TRACT SYMPTOMS: ICD-10-CM

## 2024-05-20 DIAGNOSIS — I10 ESSENTIAL HYPERTENSION: ICD-10-CM

## 2024-05-20 DIAGNOSIS — M54.12 CERVICAL RADICULOPATHY: ICD-10-CM

## 2024-05-20 RX ORDER — MELOXICAM 15 MG/1
15 TABLET ORAL DAILY
Qty: 90 TABLET | Refills: 1 | Status: SHIPPED | OUTPATIENT
Start: 2024-05-20

## 2024-05-20 RX ORDER — TAMSULOSIN HYDROCHLORIDE 0.4 MG/1
1 CAPSULE ORAL DAILY
Qty: 90 CAPSULE | Refills: 3 | Status: SHIPPED | OUTPATIENT
Start: 2024-05-20 | End: 2024-05-22 | Stop reason: SDUPTHER

## 2024-05-22 DIAGNOSIS — I10 ESSENTIAL HYPERTENSION: ICD-10-CM

## 2024-05-22 DIAGNOSIS — N40.0 BENIGN PROSTATIC HYPERPLASIA WITHOUT LOWER URINARY TRACT SYMPTOMS: ICD-10-CM

## 2024-05-22 RX ORDER — TAMSULOSIN HYDROCHLORIDE 0.4 MG/1
1 CAPSULE ORAL DAILY
Qty: 90 CAPSULE | Refills: 1 | Status: SHIPPED | OUTPATIENT
Start: 2024-05-22

## 2024-05-30 ENCOUNTER — OFFICE VISIT (OUTPATIENT)
Dept: SLEEP MEDICINE | Facility: HOSPITAL | Age: 74
End: 2024-05-30
Payer: MEDICARE

## 2024-05-30 VITALS — BODY MASS INDEX: 32.48 KG/M2 | HEIGHT: 71 IN | OXYGEN SATURATION: 97 % | WEIGHT: 232 LBS | HEART RATE: 64 BPM

## 2024-05-30 DIAGNOSIS — Z96.82 STATUS POST INSERTION OF HYPOGLOSSAL NERVE STIMULATOR: ICD-10-CM

## 2024-05-30 DIAGNOSIS — G47.33 SEVERE OBSTRUCTIVE SLEEP APNEA: Primary | ICD-10-CM

## 2024-05-30 DIAGNOSIS — Z46.2 ENCOUNTER FOR INTERROGATION OF NEUROSTIMULATOR: ICD-10-CM

## 2024-05-30 DIAGNOSIS — Z78.9 INTOLERANCE OF CONTINUOUS POSITIVE AIRWAY PRESSURE (CPAP) VENTILATION: ICD-10-CM

## 2024-05-30 DIAGNOSIS — E66.9 OBESITY (BMI 30-39.9): ICD-10-CM

## 2024-05-30 PROCEDURE — 1159F MED LIST DOCD IN RCRD: CPT | Performed by: FAMILY MEDICINE

## 2024-05-30 PROCEDURE — G0463 HOSPITAL OUTPT CLINIC VISIT: HCPCS

## 2024-05-30 PROCEDURE — 99214 OFFICE O/P EST MOD 30 MIN: CPT | Performed by: FAMILY MEDICINE

## 2024-05-30 PROCEDURE — 1160F RVW MEDS BY RX/DR IN RCRD: CPT | Performed by: FAMILY MEDICINE

## 2024-05-30 NOTE — PROGRESS NOTES
Follow Up [facility] Note     Chief Complaint:  DARIO     Primary Care Physician: Edgar Briscoe APRN    Taqueria Soto is a 74 y.o.male  was last seen at [facility] sleep lab: 1/9/2024; 9/13/2023 for study which showed overall AHI of 41.8 events per hour.  Referred to Dr. Vang for hypoglossal nerve stimulator placement.  Presents today for activation.      Implantation date: 4/17/2024     Reports no tongue pain.  No healing issues with incisions.  No pain or drainage.     Functional capacity:1.0Volts     Voltage range 1.0-2.0 V.  Start delay: 30 min  Duration time: 9 h  Pause time: 15 min    Current Medications:    Current Outpatient Medications:     albuterol sulfate  (90 Base) MCG/ACT inhaler, Inhale 2 puffs Every 4 (Four) Hours As Needed for Wheezing or Shortness of Air., Disp: 18 g, Rfl: 5    amLODIPine-benazepril (LOTREL) 10-20 MG per capsule, Take 1 capsule by mouth Daily., Disp: 90 capsule, Rfl: 1    Apoaequorin (Prevagen) 10 MG capsule, Take 1 capsule by mouth Daily., Disp: , Rfl:     atorvastatin (LIPITOR) 40 MG tablet, Take 1 tablet by mouth Daily. (Patient taking differently: Take 1 tablet by mouth Every Night.), Disp: 90 tablet, Rfl: 1    brimonidine (ALPHAGAN) 0.15 % ophthalmic solution, Administer 1 drop to both eyes 2 (Two) Times a Day., Disp: , Rfl:     cyclobenzaprine (FLEXERIL) 10 MG tablet, Take 1 tablet by mouth 3 (Three) Times a Day As Needed for Muscle Spasms., Disp: 270 tablet, Rfl: 1    fexofenadine (ALLEGRA) 180 MG tablet, Take 1 tablet by mouth Daily., Disp: 90 tablet, Rfl: 1    finasteride (PROSCAR) 5 MG tablet, Take 1 tablet by mouth Daily., Disp: 90 tablet, Rfl: 1    gabapentin (NEURONTIN) 300 MG capsule, Take 1 capsule by mouth 3 (Three) Times a Day., Disp: 270 capsule, Rfl: 1    glipizide (Glucotrol XL) 2.5 MG 24 hr tablet, Take 1 tablet by mouth Daily., Disp: 90 tablet, Rfl: 1    HYDROcodone-acetaminophen (NORCO) 7.5-325 MG per tablet, Take 1 tablet by mouth Every 6 (Six)  Hours As Needed for Moderate Pain (Pain)., Disp: 20 tablet, Rfl: 0    Insulin Glargine (LANTUS SOLOSTAR) 100 UNIT/ML injection pen, Inject 46 Units under the skin into the appropriate area as directed Daily., Disp: 45 mL, Rfl: 3    Insulin Glargine-yfgn 100 UNIT/ML solution pen-injector, , Disp: , Rfl:     Insulin Pen Needle (Pen Needles) 31G X 5 MM misc, Use 1 each Daily. DX e11.9 used with Lantus daily, Disp: 100 each, Rfl: 1    meloxicam (MOBIC) 15 MG tablet, Take 1 tablet by mouth Daily., Disp: 90 tablet, Rfl: 1    montelukast (SINGULAIR) 10 MG tablet, Take 1 tablet by mouth Every Night., Disp: 90 tablet, Rfl: 1    multivitamin with minerals tablet tablet, Take 1 tablet by mouth Daily., Disp: , Rfl:     multivitamins-minerals (PRESERVISION AREDS 2) capsule capsule, Take 1 capsule by mouth Daily., Disp: 90 capsule, Rfl: 3    ofloxacin (OCUFLOX) 0.3 % ophthalmic solution, Administer 1 drop to both eyes Take As Directed. Uses 4 days before and 4 days after eye injections, Disp: , Rfl:     omeprazole (priLOSEC) 40 MG capsule, Take 1 capsule by mouth Daily., Disp: 90 capsule, Rfl: 1    PRECISION XTRA TEST STRIPS test strip, 1 each by Other route 3 (Three) Times a Day., Disp: 600 each, Rfl: 1    Refresh Celluvisc 1 % gel eye gel, Administer 2 drops to both eyes Daily As Needed (dry eyes)., Disp: , Rfl:     SITagliptin-metFORMIN HCl ER (Janumet XR)  MG tablet, Take 2 tablets by mouth Daily. (Patient taking differently: Take 2 tablets by mouth every night at bedtime.), Disp: 180 tablet, Rfl: 1    tamsulosin (FLOMAX) 0.4 MG capsule 24 hr capsule, Take 1 capsule by mouth Daily., Disp: 90 capsule, Rfl: 1    timolol (TIMOPTIC-XR) 0.5 % ophthalmic gel-forming, Administer 1 drop to the right eye 2 (Two) Times a Day., Disp: , Rfl:    also entered in Sleep Questionnaire    Patient  has a past medical history of Acid reflux, Arthritis, BPH (benign prostatic hyperplasia), Cataract, Cervical radiculopathy (01/14/2019),  "Cervicalgia (2019), COPD (chronic obstructive pulmonary disease), Deafness, Diabetes mellitus, type 2 (2018), Essential hypertension (2018), Facet arthritis of cervical region (2019), Hematuria, microscopic, Hemorrhoids, High cholesterol, Hyperlipidemia (2018), Insulin dependent type 2 diabetes mellitus, controlled, Left bundle branch block, Macular degeneration, bilateral, Prostatitis, Shortness of breath, Sinus trouble, and Sleep apnea.    Social History:    Social History     Socioeconomic History    Marital status:    Tobacco Use    Smoking status: Former     Current packs/day: 0.00     Average packs/day: 1 pack/day for 50.0 years (50.0 ttl pk-yrs)     Types: Cigarettes     Start date:      Quit date:      Years since quittin.4    Smokeless tobacco: Never   Vaping Use    Vaping status: Never Used   Substance and Sexual Activity    Alcohol use: Yes     Comment: occas    Drug use: Never    Sexual activity: Defer       Allergies:  Nitroglycerin    Vital Signs:    Vitals:    24 0900   Pulse: 64   SpO2: 97%   Weight: 105 kg (232 lb)   Height: 180.3 cm (70.98\")     Body mass index is 32.37 kg/m².    REVIEW OF SYSTEMS.  Full review of systems available on the intake form which is scanned in the media tab.  The relevant positive are noted below  Daytime excessive sleepiness with Redmon Sleepiness Scale :Total score: 0   Snoring  See scanned media      Physical exam:  Vitals:    24 0900   Pulse: 64   SpO2: 97%   Weight: 105 kg (232 lb)   Height: 180.3 cm (70.98\")    Body mass index is 32.37 kg/m².    HEENT: Head is atraumatic, normocephalic  Eyes: pupils are round equal and reacting to light and accommodation, conjunctiva normal  Throat: tongue normal  RESPIRATORY SYSTEM: Regular respirations  CARDIOVASULAR SYSTEM: Regular rate  EXTREMITES: No cyanosis, clubbing  NEUROLOGICAL SYSTEM: Oriented x 3, no gross motor defects, gait normal    Impression:  1. Severe " obstructive sleep apnea    2. Intolerance of continuous positive airway pressure (CPAP) ventilation    3. Status post insertion of hypoglossal nerve stimulator    4. Encounter for interrogation of neurostimulator    5. Obesity (BMI 30-39.9)        Patient advised on how inspire works.  Device was tested today and activated today. Patient control set at 1-2V.  Therapy duration 9 hours start delay 30  minutes pause time 15 minutes.  Functional amplitude resulting in tongue protrusion of the lower incisors was 1.0 V.  Patient instructed on how to operate sleep remote had a turn therapy on and off and had increased and decreased active stimulation.  Advised to use inspire nightly.  Return to clinic in 6 weeks for follow-up.    Chaka Cabrera MD  Sleep Medicine  05/30/24  10:51 EDT

## 2024-06-05 ENCOUNTER — APPOINTMENT (OUTPATIENT)
Dept: GENERAL RADIOLOGY | Facility: HOSPITAL | Age: 74
End: 2024-06-05
Payer: MEDICARE

## 2024-06-05 ENCOUNTER — HOSPITAL ENCOUNTER (EMERGENCY)
Facility: HOSPITAL | Age: 74
Discharge: HOME | End: 2024-06-05
Attending: EMERGENCY MEDICINE | Admitting: EMERGENCY MEDICINE
Payer: MEDICARE

## 2024-06-05 VITALS
BODY MASS INDEX: 34.07 KG/M2 | RESPIRATION RATE: 18 BRPM | WEIGHT: 238 LBS | HEART RATE: 72 BPM | SYSTOLIC BLOOD PRESSURE: 117 MMHG | TEMPERATURE: 97.9 F | OXYGEN SATURATION: 98 % | DIASTOLIC BLOOD PRESSURE: 63 MMHG | HEIGHT: 70 IN

## 2024-06-05 DIAGNOSIS — M25.562 ACUTE PAIN OF LEFT KNEE: ICD-10-CM

## 2024-06-05 DIAGNOSIS — S76.112A QUADRICEPS TENDON RUPTURE, LEFT, INITIAL ENCOUNTER: Primary | ICD-10-CM

## 2024-06-05 PROCEDURE — 97116 GAIT TRAINING THERAPY: CPT | Performed by: PHYSICAL THERAPIST

## 2024-06-05 PROCEDURE — 99283 EMERGENCY DEPT VISIT LOW MDM: CPT

## 2024-06-05 PROCEDURE — 73562 X-RAY EXAM OF KNEE 3: CPT

## 2024-06-05 PROCEDURE — 97161 PT EVAL LOW COMPLEX 20 MIN: CPT | Performed by: PHYSICAL THERAPIST

## 2024-06-05 RX ORDER — HYDROCODONE BITARTRATE AND ACETAMINOPHEN 5; 325 MG/1; MG/1
1 TABLET ORAL EVERY 6 HOURS PRN
Qty: 10 TABLET | Refills: 0 | Status: SHIPPED | OUTPATIENT
Start: 2024-06-05 | End: 2024-06-12 | Stop reason: HOSPADM

## 2024-06-05 RX ORDER — HYDROCODONE BITARTRATE AND ACETAMINOPHEN 5; 325 MG/1; MG/1
1 TABLET ORAL EVERY 6 HOURS PRN
Status: DISCONTINUED | OUTPATIENT
Start: 2024-06-05 | End: 2024-06-05 | Stop reason: HOSPADM

## 2024-06-05 RX ADMIN — HYDROCODONE BITARTRATE AND ACETAMINOPHEN 1 TABLET: 5; 325 TABLET ORAL at 16:19

## 2024-06-05 NOTE — DISCHARGE INSTRUCTIONS
Elevate as much as possible, apply ice for 20mins on and 20mins off for the next few days  Wear the knee immobilizer and utilize the walker when ambulating  Take the norco as needed for pain and call Dr Henriquez's office for follow up appt in the next week or so  Return to the ER for any worsening or new symptoms of concern

## 2024-06-05 NOTE — THERAPY EVALUATION
Patient Name: Taqueria Soto  : 1950    MRN: [id number]                              Today's Date: 2024       Admit Date: 2024    Visit Dx:     ICD-10-CM ICD-9-CM   1. Quadriceps tendon rupture, left, initial encounter  S76.112A 843.8   2. Acute pain of left knee  M25.562 719.46     Patient Active Problem List   Diagnosis    Type 1 diabetes mellitus    Hyperlipidemia    Essential hypertension    Benign prostatic hyperplasia    Cervical radiculopathy    Diabetes mellitus without complication    High blood pressure    Chronic obstructive pulmonary disease    Hypertensive heart disease without congestive heart failure    Hemorrhoids    Gastroesophageal reflux disease    DDD (degenerative disc disease), cervical    Cervical spondylosis without myelopathy     Past Medical History:   Diagnosis Date    Acid reflux     Arthritis     BPH (benign prostatic hyperplasia)     Cataract     h/o    Cervical radiculopathy 2019    follows /[facility]    Cervicalgia 2019    COPD (chronic obstructive pulmonary disease)     Dr Dela Cruz follows    Deafness     hearing aids    Diabetes mellitus, type 2 2018    Essential hypertension 2018    Facet arthritis of cervical region 2019    Hematuria, microscopic     Hemorrhoids     High cholesterol     Hyperlipidemia 2018    Insulin dependent type 2 diabetes mellitus, controlled     Left bundle branch block     cleared by Dr Darby (cardiology)    Macular degeneration, bilateral     follows w/[facility] (Dr Marcum), gets injections    Prostatitis     Unspecified    Shortness of breath     Sinus trouble     Sleep apnea     uses CPAP     Past Surgical History:   Procedure Laterality Date    CATARACT EXTRACTION WITH INTRAOCULAR LENS IMPLANT Bilateral     EPIDURAL BLOCK      NOSE SURGERY      TONSILLECTOMY  1960      General Information       Row Name 24 1620          Physical Therapy Time and Intention    Document Type evaluation  -LR     Mode  of Treatment individual therapy  -LR       Row Name 06/05/24 4962          General Information    Patient Profile Reviewed yes  -LR     Prior Level of Function independent:  -LR               User Key  (r) = Recorded By, (t) = Taken By, (c) = Cosigned By      Initials Name Provider Type    Morena Freeman, PT Physical Therapist                  History: Pt reports this morning he was moving a heavy wheelbarrow when it fell on top of his left knee and caused him to fall down.  He states he is unable to walk on his left leg.      Objective:  Observations: there appears to be retraction of quadricep tendon superior to patella on left    Palpation: Tender to palpation at L quadricep and quadricep tendon    ROM:  Passive Knee ROM:  L Knee AROM:  R Knee AROM:  Flexion: 90°   Flexion: NT  Extension: 0°   Extension: NT    Strength:  Pt is unable to perform L knee extension in a seated position; unable to perform quad set on L     Special Tests: not performed; pt likely has a quadricep tendon tear based off of xrays, inability to extend knee, and appearance of tendon with gap above patella    Sensation: L LE sensation intact to light touch    Assessment/Plan:   Pt presents with a diagnosis of L knee pain and has signs/symptoms consistent with L quadricep tendon tear with an obvious gap superior to patella where quadricep tendon should be and inability to actively extend L knee.  The patient was placed in a L knee immobilizer.  He was instructed to wear the knee immobilizer at all times unless showering/bathing.  He was also fitted for a rolling walker.  He was instructed to put no weight to TTWB on L LE to avoid further injury.   He will follow up with orthopedics.    Goals:   LTG 1: The patient will be demonstrate appropriate gait mechanics with walker and knee immobilizer in order to safely ambulate at home.  STATUS: Met    Interventions:   Gait Training: pt fitted for L knee immobilizer; pt fitted for rolling walker and  instructed to perform TTWB or NWB on L LE to avoid further damage to knee       Outcome Measures       Row Name 06/05/24 1620          Optimal Instrument    Optimal Instrument Optimal - 3  -LR     Standing 3  -LR     Walking - short distance 4  -LR     Walking - long distance 5  -LR     From the list, choose the 3 activities you would most like to be able to do without any difficulty Standing;Walking -short distance;Walking -long distance  -LR     Total Score Optimal - 3 12  -LR       Row Name 06/05/24 1620          Functional Assessment    Outcome Measure Options Optimal Instrument  -LR               User Key  (r) = Recorded By, (t) = Taken By, (c) = Cosigned By      Initials Name Provider Type    LR Morena Avalos, PT Physical Therapist                   Time Calculation:   PT Evaluation Complexity  History, PT Evaluation Complexity: 3 or more personal factors and/or comorbidities  Examination of Body Systems (PT Eval Complexity): 1-2 elements  Clinical Presentation (PT Evaluation Complexity): stable  Clinical Decision Making (PT Evaluation Complexity): low complexity  Overall Complexity (PT Evaluation Complexity): low complexity     PT Charges       Row Name 06/05/24 1621             Time Calculation    PT Received On 06/05/24  -LR         Timed Charges    34529 - Gait Training Minutes  10  -LR         Untimed Charges    PT Eval/Re-eval Minutes 20  -LR         Total Minutes    Timed Charges Total Minutes 10  -LR      Untimed Charges Total Minutes 20  -LR       Total Minutes 30  -LR                User Key  (r) = Recorded By, (t) = Taken By, (c) = Cosigned By      Initials Name Provider Type    LR Morena Avalos, PT Physical Therapist                  Therapy Charges for Today       Code Description Service Date Service Provider Modifiers Qty    92192610181 HC GAIT TRAINING EA 15 MIN 6/5/2024 Morena Avalos, PT GP 1    84898484334 HC PT EVAL LOW COMPLEXITY 2 6/5/2024 Morena Avalos, PT GP 1            PT  G-Codes  Outcome Measure Options: Optimal Instrument       Morena Avalos, PT  6/5/2024

## 2024-06-05 NOTE — ED PROVIDER NOTES
Time: 2:18 PM EDT  Date of encounter:  6/5/2024  Independent Historian/Clinical History and Information was obtained by:   Patient    History is limited by: N/A    Chief Complaint: Left knee injury      History of Present Illness:  Patient is a 74 y.o. year old male who presents to the emergency department for evaluation of left knee injury.  Patient states he was pushing a wheelbarrow full of mud when it slipped and hit him in the front of his knee causing him to fall.  Denies head injury or LOC with fall.  Patient complains of pain in the entire surface of the left knee.  He also complains with increased pain with ambulation.  ENOCH Cole FNP-C/ENP    HPI    Patient Care Team  Primary Care Provider: Edgar Briscoe APRN    Past Medical History:     Allergies   Allergen Reactions    Nitroglycerin Other (See Comments)     Heart rate dropped down       Past Medical History:   Diagnosis Date    Acid reflux     Arthritis     BPH (benign prostatic hyperplasia)     Cataract     h/o    Cervical radiculopathy 01/14/2019    follows /[facility]    Cervicalgia 01/14/2019    COPD (chronic obstructive pulmonary disease)     Dr Dela Cruz follows    Deafness     hearing aids    Diabetes mellitus, type 2 07/24/2018    Essential hypertension 07/24/2018    Facet arthritis of cervical region 01/14/2019    Hematuria, microscopic     Hemorrhoids     High cholesterol     Hyperlipidemia 07/24/2018    Insulin dependent type 2 diabetes mellitus, controlled     Left bundle branch block     cleared by Dr Darby (cardiology)    Macular degeneration, bilateral     follows w/[facility] (Dr Marcum), gets injections    Prostatitis     Unspecified    Shortness of breath     Sinus trouble     Sleep apnea     uses CPAP     Past Surgical History:   Procedure Laterality Date    CATARACT EXTRACTION WITH INTRAOCULAR LENS IMPLANT Bilateral     EPIDURAL BLOCK      NOSE SURGERY      TONSILLECTOMY  1960     Family History   Problem Relation Age of Onset    No  Known Problems Mother     Arthritis Father     Sleep apnea Father     Diabetes Sister         Unspecified Type, Mellitus    Malig Hyperthermia Neg Hx        Home Medications:  Prior to Admission medications    Medication Sig Start Date End Date Taking? Authorizing Provider   albuterol sulfate  (90 Base) MCG/ACT inhaler Inhale 2 puffs Every 4 (Four) Hours As Needed for Wheezing or Shortness of Air. 2/12/24   Edgar Briscoe APRN   amLODIPine-benazepril (LOTREL) 10-20 MG per capsule Take 1 capsule by mouth Daily. 2/12/24   Edgar Briscoe APRN   Apoaequorin (Prevagen) 10 MG capsule Take 1 capsule by mouth Daily.    ProviderDonna MD   atorvastatin (LIPITOR) 40 MG tablet Take 1 tablet by mouth Daily.  Patient taking differently: Take 1 tablet by mouth Every Night. 2/12/24   Edgar Briscoe APRN   brimonidine (ALPHAGAN) 0.15 % ophthalmic solution Administer 1 drop to both eyes 2 (Two) Times a Day.    ProviderDonna MD   cyclobenzaprine (FLEXERIL) 10 MG tablet Take 1 tablet by mouth 3 (Three) Times a Day As Needed for Muscle Spasms. 2/12/24   Edgar Briscoe APRN   fexofenadine (ALLEGRA) 180 MG tablet Take 1 tablet by mouth Daily. 2/12/24   Edgar Briscoe APRN   finasteride (PROSCAR) 5 MG tablet Take 1 tablet by mouth Daily. 2/12/24   Edgar Briscoe APRN   gabapentin (NEURONTIN) 300 MG capsule Take 1 capsule by mouth 3 (Three) Times a Day. 2/12/24   Edgar Briscoe APRN   glipizide (Glucotrol XL) 2.5 MG 24 hr tablet Take 1 tablet by mouth Daily. 2/12/24   Edgar Briscoe APRN   HYDROcodone-acetaminophen (NORCO) 7.5-325 MG per tablet Take 1 tablet by mouth Every 6 (Six) Hours As Needed for Moderate Pain (Pain). 4/17/24   Mauri Vang MD   Insulin Glargine (LANTUS SOLOSTAR) 100 UNIT/ML injection pen Inject 46 Units under the skin into the appropriate area as directed Daily. 2/12/24   Edgar Briscoe APRN   Insulin Glargine-yfgn 100 UNIT/ML solution pen-injector  5/8/24   Provider, MD Donna   Insulin  Pen Needle (Pen Needles) 31G X 5 MM misc Use 1 each Daily. DX e11.9 used with Lantus daily 23   Edgar Briscoe APRN   meloxicam (MOBIC) 15 MG tablet Take 1 tablet by mouth Daily. 24   Edgar Briscoe APRN   montelukast (SINGULAIR) 10 MG tablet Take 1 tablet by mouth Every Night. 24   Edgar Briscoe APRN   multivitamin with minerals tablet tablet Take 1 tablet by mouth Daily.    Donna Khan MD   multivitamins-minerals (PRESERVISION AREDS 2) capsule capsule Take 1 capsule by mouth Daily. 24   Edgar Briscoe APRN   ofloxacin (OCUFLOX) 0.3 % ophthalmic solution Administer 1 drop to both eyes Take As Directed. Uses 4 days before and 4 days after eye injections 23   Donna Khan MD   omeprazole (priLOSEC) 40 MG capsule Take 1 capsule by mouth Daily. 23   Edgar Briscoe APRN   PRECISION XTRA TEST STRIPS test strip 1 each by Other route 3 (Three) Times a Day. 24   Edgar Briscoe APRN   Refresh Celluvisc 1 % gel eye gel Administer 2 drops to both eyes Daily As Needed (dry eyes). 23   Donna Khan MD   SITagliptin-metFORMIN HCl ER (Janumet XR)  MG tablet Take 2 tablets by mouth Daily.  Patient taking differently: Take 2 tablets by mouth every night at bedtime. 24   Edgar Briscoe APRN   tamsulosin (FLOMAX) 0.4 MG capsule 24 hr capsule Take 1 capsule by mouth Daily. 24   Edgar Briscoe APRN   timolol (TIMOPTIC-XR) 0.5 % ophthalmic gel-forming Administer 1 drop to the right eye 2 (Two) Times a Day.    Donna Khan MD        Social History:   Social History     Tobacco Use    Smoking status: Former     Current packs/day: 0.00     Average packs/day: 1 pack/day for 50.0 years (50.0 ttl pk-yrs)     Types: Cigarettes     Start date:      Quit date:      Years since quittin.4    Smokeless tobacco: Never   Vaping Use    Vaping status: Never Used   Substance Use Topics    Alcohol use: Yes     Comment: occas    Drug use: Never  "        Review of Systems:  Review of Systems   Musculoskeletal:  Positive for arthralgias and gait problem.        Physical Exam:  /63   Pulse (!) 42   Temp 98.1 °F (36.7 °C) (Oral)   Resp 18   Ht 177.8 cm (70\")   Wt 108 kg (238 lb)   SpO2 97%   BMI 34.15 kg/m²     Physical Exam  Constitutional:       Appearance: Normal appearance.   HENT:      Head: Normocephalic.   Eyes:      Extraocular Movements: Extraocular movements intact.      Conjunctiva/sclera: Conjunctivae normal.   Pulmonary:      Effort: Pulmonary effort is normal.   Abdominal:      General: There is no distension.   Musculoskeletal:      Right knee: Normal.      Left knee: Decreased range of motion. Tenderness present over the patellar tendon.        Legs:    Skin:     General: Skin is warm.      Coloration: Skin is not cyanotic.   Neurological:      Mental Status: He is alert and oriented to person, place, and time.   Psychiatric:         Attention and Perception: Attention and perception normal.         Mood and Affect: Mood normal.                  Procedures:  Procedures      Medical Decision Making:      Comorbidities that affect care:    Diabetes    External Notes reviewed:    None      The following orders were placed and all results were independently analyzed by me:  Orders Placed This Encounter   Procedures    [facility] 12.  Standard Walker Folding, 05.  Knee Immobilizer; Prevents Accomplishing MRADLs; Able to Safely Use Equipment; Mobility Deficit Can Be Sufficiently Resolved By Use of Equipment    XR Knee 3 View Left       Medications Given in the Emergency Department:  Medications   HYDROcodone-acetaminophen (NORCO) 5-325 MG per tablet 1 tablet (has no administration in time range)        ED Course:    ED Course as of 06/05/24 1617   Wed Jun 05, 2024   1417 -- PROVIDER IN TRIAGE NOTE ---    The patient was evaluated by me, ENOCH Ortega, in triage. Orders were placed and the patient is currently awaiting " disposition.    [CB]   1422 Patient's HR was 40-42 while in triage.  Patient A&O x 4.  Patient denies dizziness, weakness, confusion, or SOA. [CB]      ED Course User Index  [CB] Conchita Long APRN       Labs:    Lab Results (last 24 hours)       ** No results found for the last 24 hours. **             Imaging:    XR Knee 3 View Left    Result Date: 6/5/2024  XR KNEE 3 VW LEFT Date of Exam: 6/5/2024 2:42 PM EDT Indication: knee pain Comparison: July 20, 2021 Findings: No acute fracture is identified. No focal osseous abnormality is identified. There is mild tricompartmental osteoarthritis. No suprapatellar joint effusion is identified. On the lateral projection, there appears to be disruption of the quadriceps tendon insertion on the patella.     Impression: 1.Suggestion of disruption of quadriceps tendon on the patella. Correlate with clinical history and physical findings. 2.Mild tricompartmental osteoarthritis. Electronically Signed: Gentry Tucker MD  6/5/2024 3:11 PM EDT  Workstation ID: [id number]       Differential Diagnosis and Discussion:    Extremity Pain: Differential diagnosis includes but is not limited to soft tissue sprain, tendonitis, tendon injury, dislocation, fracture, deep vein thrombosis, arterial insufficiency, osteoarthritis, bursitis, and ligamentous damage.    All X-rays impressions were independently interpreted by me.    MDM     Amount and/or Complexity of Data Reviewed  Tests in the radiology section of CPT®: reviewed  Decide to obtain previous medical records or to obtain history from someone other than the patient: yes                 Patient Care Considerations:           Consultants/Shared Management Plan:    Consultant: I have discussed the case with Dr Henriquez who states will follow up in office    Social Determinants of Health:    Patient is independent, reliable, and has access to care.       Disposition and Care Coordination:    Discharged: The patient is suitable and  stable for discharge with no need for consideration of admission.    I have explained the patient´s condition, diagnoses and treatment plan based on the information available to me at this time. I have answered questions and addressed any concerns. The patient has a good  understanding of the patient´s diagnosis, condition, and treatment plan as can be expected at this point. The vital signs have been stable. The patient´s condition is stable and appropriate for discharge from the emergency department.      The patient will pursue further outpatient evaluation with the primary care physician or other designated or consulting physician as outlined in the discharge instructions. They are agreeable to this plan of care and follow-up instructions have been explained in detail. The patient has received these instructions in written format and has expressed an understanding of the discharge instructions. The patient is aware that any significant change in condition or worsening of symptoms should prompt an immediate return to this or the closest emergency department or call to 911.  I have explained discharge medications and the need for follow up with the patient/caretakers. This was also printed in the discharge instructions. Patient was discharged with the following medications and follow up:      Medication List        Changed      atorvastatin 40 MG tablet  Commonly known as: LIPITOR  Take 1 tablet by mouth Daily.  What changed: when to take this     * HYDROcodone-acetaminophen 7.5-325 MG per tablet  Commonly known as: NORCO  Take 1 tablet by mouth Every 6 (Six) Hours As Needed for Moderate Pain (Pain).  What changed: Another medication with the same name was added. Make sure you understand how and when to take each.     * HYDROcodone-acetaminophen 5-325 MG per tablet  Commonly known as: NORCO  Take 1 tablet by mouth Every 6 (Six) Hours As Needed for Moderate Pain.  What changed: You were already taking a medication  with the same name, and this prescription was added. Make sure you understand how and when to take each.     Janumet XR  MG tablet  Generic drug: SITagliptin-metFORMIN HCl ER  Take 2 tablets by mouth Daily.  What changed: when to take this           * This list has 2 medication(s) that are the same as other medications prescribed for you. Read the directions carefully, and ask your doctor or other care provider to review them with you.                   Where to Get Your Medications        These medications were sent to [facility] - [place], [place] - [address] [facility] - [phone]  - [phone]   [address] [facility] [place] [address]      Phone: [phone]   HYDROcodone-acetaminophen 5-325 MG per tablet      Edgar Briscoe, APRN  [address]  [address]  [place] [facility] [phone]      Follow up regarding low heart rate       Final diagnoses:   Quadriceps tendon rupture, left, initial encounter   Acute pain of left knee        ED Disposition       ED Disposition   Discharge    Condition   Stable    Comment   --               This medical record created using voice recognition software.             Sonia Stafford, APRN  06/05/24 1617

## 2024-06-06 ENCOUNTER — PREP FOR SURGERY (OUTPATIENT)
Dept: OTHER | Facility: HOSPITAL | Age: 74
End: 2024-06-06
Payer: MEDICARE

## 2024-06-06 ENCOUNTER — TELEPHONE (OUTPATIENT)
Dept: ORTHOPEDIC SURGERY | Facility: CLINIC | Age: 74
End: 2024-06-06
Payer: MEDICARE

## 2024-06-06 ENCOUNTER — OFFICE VISIT (OUTPATIENT)
Dept: ORTHOPEDIC SURGERY | Facility: CLINIC | Age: 74
End: 2024-06-06
Payer: MEDICARE

## 2024-06-06 VITALS
SYSTOLIC BLOOD PRESSURE: 150 MMHG | WEIGHT: 232.1 LBS | BODY MASS INDEX: 33.23 KG/M2 | HEART RATE: 70 BPM | HEIGHT: 70 IN | DIASTOLIC BLOOD PRESSURE: 65 MMHG | OXYGEN SATURATION: 94 %

## 2024-06-06 DIAGNOSIS — S76.112A QUADRICEPS TENDON RUPTURE, LEFT, INITIAL ENCOUNTER: Primary | ICD-10-CM

## 2024-06-06 PROBLEM — S76.119A QUADRICEPS TENDON RUPTURE: Status: ACTIVE | Noted: 2024-06-06

## 2024-06-06 NOTE — H&P (VIEW-ONLY)
"Chief Complaint  Follow-up of the Left Thigh     Subjective      Taqueria Soto presents to [facility] for initial evaluation of the left thigh.  He was pushing a wheelbarrow forward and tried to catch it and he couldn't and the handle came down on the knee.  He went to he ER and had X ray on 24.  He is here today with a knee immobilizer and for treatment intervention.     Allergies   Allergen Reactions    Nitroglycerin Other (See Comments)     Heart rate dropped down          Social History     Socioeconomic History    Marital status:    Tobacco Use    Smoking status: Former     Current packs/day: 0.00     Average packs/day: 1 pack/day for 50.0 years (50.0 ttl pk-yrs)     Types: Cigarettes     Start date:      Quit date:      Years since quittin.4    Smokeless tobacco: Never   Vaping Use    Vaping status: Never Used   Substance and Sexual Activity    Alcohol use: Yes     Comment: occas    Drug use: Never    Sexual activity: Defer        I reviewed the patient's chief complaint, history of present illness, review of systems, past medical history, surgical history, family history, social history, medications, and allergy list.     Review of Systems     Constitutional: Denies fevers, chills, weight loss  Cardiovascular: Denies chest pain, shortness of breath  Skin: Denies rashes, acute skin changes  Neurologic: Denies headache, loss of consciousness        Vital Signs:   /65   Pulse 70   Ht 177.8 cm (70\")   Wt 105 kg (232 lb 1.6 oz)   SpO2 94%   BMI 33.30 kg/m²          Physical Exam  General: Alert. No acute distress    Ortho Exam        LEFT HIP Mild bruising and swelling. . No skin discoloration, atrophy or swelling. Positive EHL, FHL, GS, and TA. Sensation intact to all 5 nerves of the foot. Negative straight leg raise. Leg lengths appear equal. Ambulates with Antalgic gait .       Procedures      Imaging Results (Most Recent)       None         "     Result Review :         XR Knee 3 View Left    Result Date: 6/5/2024  Narrative: XR KNEE 3 VW LEFT Date of Exam: 6/5/2024 2:42 PM EDT Indication: knee pain Comparison: July 20, 2021 Findings: No acute fracture is identified. No focal osseous abnormality is identified. There is mild tricompartmental osteoarthritis. No suprapatellar joint effusion is identified. On the lateral projection, there appears to be disruption of the quadriceps tendon insertion on the patella.     Impression: Impression: 1.Suggestion of disruption of quadriceps tendon on the patella. Correlate with clinical history and physical findings. 2.Mild tricompartmental osteoarthritis. Electronically Signed: Gentry Tucker MD  6/5/2024 3:11 PM EDT  Workstation ID: [id number]            Assessment and Plan     Diagnoses and all orders for this visit:    1. Quadriceps tendon rupture, left, initial encounter (Primary)        Discussed the treatment plan with the patient.     Given hinged knee brace locked at 0.     Use walker and brace to offset weight bear.     Discussed the treatment options with the patient, operative vs non-operative.   The patient expressed understanding and wished to proceed with a quadriceps tendon repair on the left.     Discussed surgery., Risks/benefits discussed with patient including, but not limited to: infection, bleeding, neurovascular damage, malunion, nonunion, aesthetic deformity, need for further surgery, and death., Surgery pamphlet given., and Call or return if worsening symptoms.    Follow Up     2 weeks postoperatively       Patient was given instructions and counseling regarding his condition or for health maintenance advice. Please see specific information pulled into the AVS if appropriate.     Scribed for Emory Henriquez MD by Lalitha Serna MA.  06/06/24   14:05 EDT    I have personally performed the services described in this document as scribed by the above individual and it is both accurate and  complete. Emory Henriquez MD 06/06/24

## 2024-06-06 NOTE — PROGRESS NOTES
"Chief Complaint  Follow-up of the Left Thigh     Subjective      Taqueria Soto presents to [facility] for initial evaluation of the left thigh.  He was pushing a wheelbarrow forward and tried to catch it and he couldn't and the handle came down on the knee.  He went to he ER and had X ray on 24.  He is here today with a knee immobilizer and for treatment intervention.     Allergies   Allergen Reactions    Nitroglycerin Other (See Comments)     Heart rate dropped down          Social History     Socioeconomic History    Marital status:    Tobacco Use    Smoking status: Former     Current packs/day: 0.00     Average packs/day: 1 pack/day for 50.0 years (50.0 ttl pk-yrs)     Types: Cigarettes     Start date:      Quit date:      Years since quittin.4    Smokeless tobacco: Never   Vaping Use    Vaping status: Never Used   Substance and Sexual Activity    Alcohol use: Yes     Comment: occas    Drug use: Never    Sexual activity: Defer        I reviewed the patient's chief complaint, history of present illness, review of systems, past medical history, surgical history, family history, social history, medications, and allergy list.     Review of Systems     Constitutional: Denies fevers, chills, weight loss  Cardiovascular: Denies chest pain, shortness of breath  Skin: Denies rashes, acute skin changes  Neurologic: Denies headache, loss of consciousness        Vital Signs:   /65   Pulse 70   Ht 177.8 cm (70\")   Wt 105 kg (232 lb 1.6 oz)   SpO2 94%   BMI 33.30 kg/m²          Physical Exam  General: Alert. No acute distress    Ortho Exam        LEFT HIP Mild bruising and swelling. . No skin discoloration, atrophy or swelling. Positive EHL, FHL, GS, and TA. Sensation intact to all 5 nerves of the foot. Negative straight leg raise. Leg lengths appear equal. Ambulates with Antalgic gait .       Procedures      Imaging Results (Most Recent)       None         "     Result Review :         XR Knee 3 View Left    Result Date: 6/5/2024  Narrative: XR KNEE 3 VW LEFT Date of Exam: 6/5/2024 2:42 PM EDT Indication: knee pain Comparison: July 20, 2021 Findings: No acute fracture is identified. No focal osseous abnormality is identified. There is mild tricompartmental osteoarthritis. No suprapatellar joint effusion is identified. On the lateral projection, there appears to be disruption of the quadriceps tendon insertion on the patella.     Impression: Impression: 1.Suggestion of disruption of quadriceps tendon on the patella. Correlate with clinical history and physical findings. 2.Mild tricompartmental osteoarthritis. Electronically Signed: Gentry Tucker MD  6/5/2024 3:11 PM EDT  Workstation ID: [id number]            Assessment and Plan     Diagnoses and all orders for this visit:    1. Quadriceps tendon rupture, left, initial encounter (Primary)        Discussed the treatment plan with the patient.     Given hinged knee brace locked at 0.     Use walker and brace to offset weight bear.     Discussed the treatment options with the patient, operative vs non-operative.   The patient expressed understanding and wished to proceed with a quadriceps tendon repair on the left.     Discussed surgery., Risks/benefits discussed with patient including, but not limited to: infection, bleeding, neurovascular damage, malunion, nonunion, aesthetic deformity, need for further surgery, and death., Surgery pamphlet given., and Call or return if worsening symptoms.    Follow Up     2 weeks postoperatively       Patient was given instructions and counseling regarding his condition or for health maintenance advice. Please see specific information pulled into the AVS if appropriate.     Scribed for Emory Henriquez MD by Lalitha Serna MA.  06/06/24   14:05 EDT    I have personally performed the services described in this document as scribed by the above individual and it is both accurate and  complete. Emory Henriquez MD 06/06/24

## 2024-06-06 NOTE — TELEPHONE ENCOUNTER
Caller: MELBA PABON    Relationship to patient: Emergency Contact    Best call back number:  [phone]    Chief complaint: Quadriceps tendon rupture, left, initial encounter      Type of visit: NEW PROBLEM     Requested date: ASAP       Additional notes:PATIENT IS ESTABLISHED WITH DR DALE AND HAD TO GO TO ER YESTERDAY WHERE DR GORE IS ON CALL.  THEY HAVE Quadriceps tendon rupture, left, initial encounter  AND LEFT KNEE PAIN.  PLEASE ADVISE ON WHO WE SHOULD SCHEDULE WITH AND CONTACT FOR URGENT AVAILABILITY

## 2024-06-07 NOTE — PRE-PROCEDURE INSTRUCTIONS
PATIENT INSTRUCTED TO BE:    - NOTHING TO EAT AFTER MIDNIGHT OR CHEW, EXCEPT CAN HAVE CLEAR LIQUIDS 2 HOURS PRIOR TO SURGERY ARRIVAL TIME     - TO HOLD ALL VITAMINS, SUPPLEMENTS, NSAIDS FOR ONE WEEK PRIOR TO THEIR SURGICAL PROCEDURE LAST DOSE 6/7/24    - DO NOT TAKE ___NA___________________ 7 DAYS PRIOR TO PROCEDURE PER ANESTHESIA RECOMMENDATIONS/INSTRUCTIONS     - INSTRUCTED PT TO USE SURGICAL SOAP 1 TIME THE NIGHT PRIOR TO SURGERY ___--________ OR THE AM OF SURGERY ______6/12/24_______   USE SOAP FROM NECK TO TOES AVOID THEIR FACE, HAIR, AND PRIVATE PARTS. INSTRUCTED NO LOTIONS, JEWELRY, PIERCINGS, OR DEODORANT DAY OF SURGERY    - IF DIABETIC, CHECK BLOOD GLUCOSE IF LESS THAN 70 OR HAVING SYMPTOMS CALL THE [facility] AREA FOR INSTRUCTIONS ON AM OF SURGERY ([phone])    -INSTRUCTED TO TAKE THE FOLLOWING MEDICATIONS THE DAY OF SURGERY WITH SIPS OF WATER:   ALBUTEROL INHALER AS NEEDED, GLARGINE INSULIN 23 UNITS FOR BLOOD SUGAR >70, ALPHAGAN AND TIMOLOL EYE DROPS, TAMSULOSIN, FINASTERIDE, GABAPENTIN, ALLEGRA        - DO NOT BRING ANY MEDICATIONS WITH YOU TO THE HOSPITAL THE DAY OF SURGERY, EXCEPT IF USE INHALERS. BRING INHALERS DAY OF SURGERY       - BRING CPAP OR BIPAP TO THE HOSPITAL ONLY IF ARE SPENDING THE NIGHT BRING CONTROLLER FOR ASPIRE IMPLANT    - DO NOT SMOKE OR VAPE 24 HOURS PRIOR TO PROCEDURE PER ANESTHESIA REQUEST NA    -MAKE SURE YOU HAVE A RIDE HOME OR SOMEONE TO STAY WITH YOU THE DAY OF THE PROCEDURE AFTER YOU GO HOME    - FOLLOW ANY OTHER INSTRUCTIONS GIVEN TO YOU BY YOUR SURGEON'S OFFICE.     - PREADMISSION TESTING NURSE MARILYN KANG [phone] IF HAVE ANY QUESTIONS     PATIENT PROVIDED THE NUMBER FOR [facility] SURGICAL DEPT IF HAD QUESTIONS AFTER HOURS PRIOR TO SURGERY ([phone])  INFORMED PT IF NO ANSWER, LEAVE A MESSAGE AND SOMEONE WILL RETURN THEIR CALL       PATIENT VERBALIZED UNDERSTANDING

## 2024-06-10 ENCOUNTER — TRANSCRIBE ORDERS (OUTPATIENT)
Dept: CARDIOLOGY | Facility: HOSPITAL | Age: 74
End: 2024-06-10
Payer: MEDICARE

## 2024-06-10 DIAGNOSIS — R09.89 BRUIT: Primary | ICD-10-CM

## 2024-06-11 ENCOUNTER — ANESTHESIA EVENT (OUTPATIENT)
Dept: PERIOP | Facility: HOSPITAL | Age: 74
End: 2024-06-11
Payer: MEDICARE

## 2024-06-12 ENCOUNTER — ANESTHESIA EVENT CONVERTED (OUTPATIENT)
Dept: ANESTHESIOLOGY | Facility: HOSPITAL | Age: 74
End: 2024-06-12
Payer: MEDICARE

## 2024-06-12 ENCOUNTER — ANESTHESIA (OUTPATIENT)
Dept: PERIOP | Facility: HOSPITAL | Age: 74
End: 2024-06-12
Payer: MEDICARE

## 2024-06-12 ENCOUNTER — HOSPITAL ENCOUNTER (OUTPATIENT)
Facility: HOSPITAL | Age: 74
Discharge: HOME | End: 2024-06-12
Attending: ORTHOPAEDIC SURGERY | Admitting: ORTHOPAEDIC SURGERY
Payer: MEDICARE

## 2024-06-12 VITALS
OXYGEN SATURATION: 94 % | SYSTOLIC BLOOD PRESSURE: 134 MMHG | WEIGHT: 229.28 LBS | TEMPERATURE: 97 F | DIASTOLIC BLOOD PRESSURE: 71 MMHG | RESPIRATION RATE: 20 BRPM | HEIGHT: 71 IN | HEART RATE: 64 BPM | BODY MASS INDEX: 32.1 KG/M2

## 2024-06-12 DIAGNOSIS — S76.112A QUADRICEPS TENDON RUPTURE, LEFT, INITIAL ENCOUNTER: ICD-10-CM

## 2024-06-12 LAB
GLUCOSE BLDC GLUCOMTR-MCNC: 119 MG/DL (ref 70–99)
GLUCOSE BLDC GLUCOMTR-MCNC: 131 MG/DL (ref 70–99)

## 2024-06-12 PROCEDURE — 25010000002 CEFAZOLIN PER 500 MG: Performed by: ORTHOPAEDIC SURGERY

## 2024-06-12 PROCEDURE — 25010000002 DEXAMETHASONE PER 1 MG: Performed by: ANESTHESIOLOGY

## 2024-06-12 PROCEDURE — A9270 NON-COVERED ITEM OR SERVICE: HCPCS | Performed by: ANESTHESIOLOGY

## 2024-06-12 PROCEDURE — 25010000002 ONDANSETRON PER 1 MG

## 2024-06-12 PROCEDURE — 27385 REPAIR OF THIGH MUSCLE: CPT | Performed by: ORTHOPAEDIC SURGERY

## 2024-06-12 PROCEDURE — 82948 REAGENT STRIP/BLOOD GLUCOSE: CPT

## 2024-06-12 PROCEDURE — L1830 KO IMMOB CANVAS LONG PRE OTS: HCPCS | Performed by: ORTHOPAEDIC SURGERY

## 2024-06-12 PROCEDURE — 25010000002 PROPOFOL 10 MG/ML EMULSION

## 2024-06-12 PROCEDURE — 63710000001 ACETAMINOPHEN EXTRA STRENGTH 500 MG TABLET: Performed by: ANESTHESIOLOGY

## 2024-06-12 PROCEDURE — 25010000002 FENTANYL CITRATE (PF) 50 MCG/ML SOLUTION

## 2024-06-12 PROCEDURE — 82948 REAGENT STRIP/BLOOD GLUCOSE: CPT | Performed by: ORTHOPAEDIC SURGERY

## 2024-06-12 PROCEDURE — 25810000003 LACTATED RINGERS PER 1000 ML: Performed by: ANESTHESIOLOGY

## 2024-06-12 PROCEDURE — 25010000002 MIDAZOLAM PER 1MG: Performed by: ANESTHESIOLOGY

## 2024-06-12 DEVICE — IMPLANTABLE DEVICE: Type: IMPLANTABLE DEVICE | Site: KNEE | Status: FUNCTIONAL

## 2024-06-12 RX ORDER — PROMETHAZINE HYDROCHLORIDE 12.5 MG/1
25 TABLET ORAL ONCE AS NEEDED
Status: DISCONTINUED | OUTPATIENT
Start: 2024-06-12 | End: 2024-06-12 | Stop reason: HOSPADM

## 2024-06-12 RX ORDER — ONDANSETRON 2 MG/ML
4 INJECTION INTRAMUSCULAR; INTRAVENOUS ONCE AS NEEDED
Status: DISCONTINUED | OUTPATIENT
Start: 2024-06-12 | End: 2024-06-12 | Stop reason: HOSPADM

## 2024-06-12 RX ORDER — ONDANSETRON 2 MG/ML
INJECTION INTRAMUSCULAR; INTRAVENOUS AS NEEDED
Status: DISCONTINUED | OUTPATIENT
Start: 2024-06-12 | End: 2024-06-12 | Stop reason: SURG

## 2024-06-12 RX ORDER — FENTANYL CITRATE 50 UG/ML
INJECTION, SOLUTION INTRAMUSCULAR; INTRAVENOUS AS NEEDED
Status: DISCONTINUED | OUTPATIENT
Start: 2024-06-12 | End: 2024-06-12 | Stop reason: SURG

## 2024-06-12 RX ORDER — SODIUM CHLORIDE, SODIUM LACTATE, POTASSIUM CHLORIDE, CALCIUM CHLORIDE 600; 310; 30; 20 MG/100ML; MG/100ML; MG/100ML; MG/100ML
9 INJECTION, SOLUTION INTRAVENOUS CONTINUOUS PRN
Status: DISCONTINUED | OUTPATIENT
Start: 2024-06-12 | End: 2024-06-12 | Stop reason: HOSPADM

## 2024-06-12 RX ORDER — HYDROCODONE BITARTRATE AND ACETAMINOPHEN 7.5; 325 MG/1; MG/1
1 TABLET ORAL ONCE AS NEEDED
Status: DISCONTINUED | OUTPATIENT
Start: 2024-06-12 | End: 2024-06-12 | Stop reason: HOSPADM

## 2024-06-12 RX ORDER — PROMETHAZINE HYDROCHLORIDE 25 MG/1
25 SUPPOSITORY RECTAL ONCE AS NEEDED
Status: DISCONTINUED | OUTPATIENT
Start: 2024-06-12 | End: 2024-06-12 | Stop reason: HOSPADM

## 2024-06-12 RX ORDER — DEXAMETHASONE SODIUM PHOSPHATE 4 MG/ML
INJECTION, SOLUTION INTRA-ARTICULAR; INTRALESIONAL; INTRAMUSCULAR; INTRAVENOUS; SOFT TISSUE
Status: COMPLETED | OUTPATIENT
Start: 2024-06-12 | End: 2024-06-12

## 2024-06-12 RX ORDER — MIDAZOLAM HYDROCHLORIDE 2 MG/2ML
2 INJECTION, SOLUTION INTRAMUSCULAR; INTRAVENOUS ONCE
Status: COMPLETED | OUTPATIENT
Start: 2024-06-12 | End: 2024-06-12

## 2024-06-12 RX ORDER — LIDOCAINE HYDROCHLORIDE 5 MG/ML
INJECTION, SOLUTION INFILTRATION; INTRAVENOUS AS NEEDED
Status: DISCONTINUED | OUTPATIENT
Start: 2024-06-12 | End: 2024-06-12 | Stop reason: SURG

## 2024-06-12 RX ORDER — PROPOFOL 10 MG/ML
VIAL (ML) INTRAVENOUS AS NEEDED
Status: DISCONTINUED | OUTPATIENT
Start: 2024-06-12 | End: 2024-06-12 | Stop reason: SURG

## 2024-06-12 RX ORDER — HYDROCODONE BITARTRATE AND ACETAMINOPHEN 7.5; 325 MG/1; MG/1
1-2 TABLET ORAL EVERY 4 HOURS PRN
Qty: 30 TABLET | Refills: 0 | Status: SHIPPED | OUTPATIENT
Start: 2024-06-12 | End: 2024-06-21 | Stop reason: SDUPTHER

## 2024-06-12 RX ORDER — EPHEDRINE SULFATE 50 MG/ML
INJECTION INTRAVENOUS AS NEEDED
Status: DISCONTINUED | OUTPATIENT
Start: 2024-06-12 | End: 2024-06-12 | Stop reason: SURG

## 2024-06-12 RX ORDER — ACETAMINOPHEN 500 MG
1000 TABLET ORAL ONCE
Status: COMPLETED | OUTPATIENT
Start: 2024-06-12 | End: 2024-06-12

## 2024-06-12 RX ORDER — OXYCODONE HYDROCHLORIDE 5 MG/1
5 TABLET ORAL
Status: DISCONTINUED | OUTPATIENT
Start: 2024-06-12 | End: 2024-06-12 | Stop reason: HOSPADM

## 2024-06-12 RX ORDER — BUPIVACAINE HYDROCHLORIDE AND EPINEPHRINE 5; 5 MG/ML; UG/ML
INJECTION, SOLUTION EPIDURAL; INTRACAUDAL; PERINEURAL
Status: COMPLETED | OUTPATIENT
Start: 2024-06-12 | End: 2024-06-12

## 2024-06-12 RX ADMIN — EPHEDRINE SULFATE 5 MG: 50 INJECTION INTRAVENOUS at 09:29

## 2024-06-12 RX ADMIN — DEXAMETHASONE SODIUM PHOSPHATE 4 MG: 4 INJECTION, SOLUTION INTRAMUSCULAR; INTRAVENOUS at 08:37

## 2024-06-12 RX ADMIN — DEXAMETHASONE SODIUM PHOSPHATE 4 MG: 4 INJECTION, SOLUTION INTRAMUSCULAR; INTRAVENOUS at 09:15

## 2024-06-12 RX ADMIN — ACETAMINOPHEN 1000 MG: 500 TABLET ORAL at 08:29

## 2024-06-12 RX ADMIN — PROPOFOL 80 MG: 10 INJECTION, EMULSION INTRAVENOUS at 09:47

## 2024-06-12 RX ADMIN — EPHEDRINE SULFATE 5 MG: 50 INJECTION INTRAVENOUS at 09:32

## 2024-06-12 RX ADMIN — ONDANSETRON HYDROCHLORIDE 4 MG: 2 SOLUTION INTRAMUSCULAR; INTRAVENOUS at 09:47

## 2024-06-12 RX ADMIN — FENTANYL CITRATE 50 MCG: 50 INJECTION, SOLUTION INTRAMUSCULAR; INTRAVENOUS at 09:07

## 2024-06-12 RX ADMIN — SODIUM CHLORIDE 2 G: 9 INJECTION, SOLUTION INTRAVENOUS at 09:07

## 2024-06-12 RX ADMIN — PROPOFOL 120 MG: 10 INJECTION, EMULSION INTRAVENOUS at 09:02

## 2024-06-12 RX ADMIN — MIDAZOLAM HYDROCHLORIDE 2 MG: 1 INJECTION, SOLUTION INTRAMUSCULAR; INTRAVENOUS at 08:34

## 2024-06-12 RX ADMIN — EPHEDRINE SULFATE 10 MG: 50 INJECTION INTRAVENOUS at 09:36

## 2024-06-12 RX ADMIN — LIDOCAINE HYDROCHLORIDE 100 MG: 5 INJECTION, SOLUTION INFILTRATION; INTRAVENOUS at 09:02

## 2024-06-12 RX ADMIN — FENTANYL CITRATE 50 MCG: 50 INJECTION, SOLUTION INTRAMUSCULAR; INTRAVENOUS at 09:47

## 2024-06-12 RX ADMIN — EPHEDRINE SULFATE 10 MG: 50 INJECTION INTRAVENOUS at 09:20

## 2024-06-12 RX ADMIN — EPHEDRINE SULFATE 10 MG: 50 INJECTION INTRAVENOUS at 09:24

## 2024-06-12 RX ADMIN — BUPIVACAINE HYDROCHLORIDE AND EPINEPHRINE BITARTRATE 30 ML: 5; .005 INJECTION, SOLUTION EPIDURAL; INTRACAUDAL; PERINEURAL at 08:37

## 2024-06-12 RX ADMIN — SODIUM CHLORIDE, POTASSIUM CHLORIDE, SODIUM LACTATE AND CALCIUM CHLORIDE 9 ML/HR: 600; 310; 30; 20 INJECTION, SOLUTION INTRAVENOUS at 08:30

## 2024-06-12 NOTE — ANESTHESIA PREPROCEDURE EVALUATION
Anesthesia Evaluation     Patient summary reviewed and Nursing notes reviewed   no history of anesthetic complications:   NPO Solid Status: > 8 hours  NPO Liquid Status: > 2 hours           Airway   Mallampati: I  TM distance: >3 FB  Neck ROM: full  No difficulty expected  Dental    (+) poor dentition and upper dentures        Pulmonary - normal exam    breath sounds clear to auscultation  (+) a smoker Former, COPD,sleep apnea  Cardiovascular - normal exam  Exercise tolerance: poor (<4 METS)    ECG reviewed  Rhythm: regular  Rate: normal    (+) hypertension, hyperlipidemia    ROS comment: LBBB    Neuro/Psych- negative ROS  GI/Hepatic/Renal/Endo    (+) diabetes mellitus    Musculoskeletal     Abdominal    Substance History      OB/GYN          Other   arthritis,     ROS/Med Hx Other: <4METS.HX SOAE,DECREASED MOBILITY.     ECHO 2019 EF 55%,MILD AS,   STRESS ECHO 1/4/24 LOW RISK FOR ISCHEMIA     PULM OV 5/13/24 F/U 3[facility].      CARD CLEARANCE. KT                Anesthesia Plan    ASA 3     general with block     (Patient understands anesthesia not responsible for dental damage. Regional anesthesia options discussed with patient. Pt accepts regional block. Femoral Nerve block)  intravenous induction     Anesthetic plan, risks, benefits, and alternatives have been provided, discussed and informed consent has been obtained with: patient.    Plan discussed with CRNA.    CODE STATUS:

## 2024-06-12 NOTE — ANESTHESIA POSTPROCEDURE EVALUATION
Patient: Taqueria Soto    Procedure Summary       Date: 06/12/24 Room / Location: [facility] OR  / [facility]    Anesthesia Start: 0857 Anesthesia Stop: 1003    Procedure: QUADRICEPS TENDON REPAIR (Left: Knee) Diagnosis:       Quadriceps tendon rupture, left, initial encounter      (Quadriceps tendon rupture, left, initial encounter [S76.112A])    Surgeons: Emory Henriquez MD Provider: Jesus Brewster MD    Anesthesia Type: general with block ASA Status: 3            Anesthesia Type: general with block    Vitals  Vitals Value Taken Time   /71 06/12/24 1048   Temp 36.1 °C (97 °F) 06/12/24 1011   Pulse 64 06/12/24 1048   Resp 20 06/12/24 1016   SpO2 94 % 06/12/24 1048           Post Anesthesia Care and Evaluation    Patient location during evaluation: bedside  Patient participation: complete - patient participated  Level of consciousness: awake  Pain score: 0  Pain management: adequate    Airway patency: patent  PONV Status: none  Cardiovascular status: acceptable and stable  Respiratory status: acceptable  Hydration status: acceptable

## 2024-06-12 NOTE — DISCHARGE INSTRUCTIONS
DISCHARGE INSTRUCTIONS  ORTHOPEDICS      For your surgery you had:  General anesthesia (you may have a sore throat for the first 24 hours)  You may experience dizziness, drowsiness, or light-headedness for several hours following surgery  Do not stay alone today or tonight.  Limit your activity for 24 hours.  Resume your diet slowly.  Follow whatever special dietary instructions you may have been given by the doctor.  You should not drive or operate machinery or drink alcohol for 24 hours or while you are taking pain medication.  You should not sign any legally binding documents.  If you had an axillary or regional block, you will not have control of the involved limb for up to 12 hours.  .     You may shower do not get dressing wet   Sleep with the injured part elevated on a pillow.  Medications per physician's instructions as indicated on Discharge Medication Information Sheet.  Follow verbal instructions of your doctor.  Sit with the lower leg propped up on a footstool or chair with pillows.  Exercise  toes for 10 minutes every hour while awake. Ice bag to injured area for 72 hours.  Change the blue ice pack every 4 hours. For 72 hours  Never place ice directly on skin or cast.    Avoid getting cast or dressing wet.  In addition to these instructions, follow the discharge instructions on postoperative arthroscopic surgery.  SPECIAL INSTRUCTIONS:  May take an over the counter stool softener as needed   May take a motrin    Non weight bearing leave brace on do not remove    Last dose of pain medication was given at:    NOTIFY THE PHYSICIAN IF YOU EXPERIENCE:  Numbness of toes.  Inability to move toes.  Extreme coldness, paleness or blue dis-coloration of toes.  Excessive swelling of affected surgical site or swelling that causes the cast to rub or cut into skin.  Pain unrelieved by pain medication  Nausea/vomiting not relieved by prescribed medication  Unable to urinate in 6 hours after surgery  Temperature  greater than 101 degree Fahrenheit or chills  If unable to reach your doctor, please go to the closest emergency room

## 2024-06-12 NOTE — ANESTHESIA PROCEDURE NOTES
Peripheral Block      Patient reassessed immediately prior to procedure    Patient location during procedure: pre-op  Start time: 6/12/2024 8:33 AM  Stop time: 6/12/2024 8:37 AM  Reason for block: at surgeon's request and post-op pain management  Performed by  Anesthesiologist: Jesus Brewster MD  Preanesthetic Checklist  Completed: patient identified, IV checked, site marked, risks and benefits discussed, surgical consent, monitors and equipment checked, pre-op evaluation and timeout performed  Prep:  Pt Position: supine  Sterile barriers:alcohol skin prep, cap, partial drape, washed/disinfected hands, gloves and mask  Prep: ChloraPrep  Patient monitoring: blood pressure monitoring, continuous pulse oximetry and EKG  Procedure    Sedation: yes    Guidance:ultrasound guided    ULTRASOUND INTERPRETATION.  Using ultrasound guidance a 20 G gauge needle was placed in close proximity to the femoral nerve, at which point, under ultrasound guidance anesthetic was injected in the area of the nerve and spread of the anesthesia was seen on ultrasound in close proximity thereto.  There were no abnormalities seen on ultrasound; a digital image was taken; and the patient tolerated the procedure with no complications. Images:still images obtained, printed/placed on chart    Laterality:left  Block Type:femoral  Injection Technique:single-shot  Needle Type:echogenic  Needle Gauge:20 G (4in)  Resistance on Injection: none    Medications Used: bupivacaine-EPINEPHrine PF (MARCAINE w/EPI) 0.5% -[id number] injection - Injection   30 mL - 6/12/2024 8:37:00 AM  dexamethasone (DECADRON) injection 4 mg/mL - Injection   4 mg - 6/12/2024 8:37:00 AM      Post Assessment  Injection Assessment: negative aspiration for heme, no paresthesia on injection and incremental injection  Patient Tolerance:comfortable throughout block  Complications:no  Additional Notes  The block or continuous infusion is requested by the referring physician for  management of postoperative pain, or pain related to a procedure. Ultrasound guidance (deemed medically necessary). Painless injection, pt was awake and conversant during the procedure without complications. Needle and surrounding structures visualized throughout procedure. No adverse reactions or complications seen during this period. Post-procedure image showed no signs of complication, and anatomy was consistent with an uncomplicated nerve blockade.  Performed by: Jesus Brewster MD

## 2024-06-12 NOTE — OP NOTE
QUADRICEPS TENDON REPAIR  Procedure Report    Patient Name:  Taqueria Soto  YOB: 1950    Date of Surgery:  6/12/2024     Indications: Quad tendon rupture    Pre-op Diagnosis:   Quadriceps tendon rupture, left, initial encounter [S76.112A]       Post-Op Diagnosis Codes:     * Quadriceps tendon rupture, left, initial encounter [S76.112A]    Procedure/CPT® Codes:      Procedure(s):  Left QUADRICEPS TENDON REPAIR              Staff:  Surgeon(s):  Emory Henriquez MD    Assistant: Katy Brown    Anesthesia: General with Block    Estimated Blood Loss: 10 mL    Implants:    Implant Name Type Inv. Item Serial No.  Lot No. LRB No. Used Action   SUT FW NMBR5 38IN - [id number] Implant SUT FW NMBR5 38IN  [id number] Left 1 Implanted   SUT FW NMBR5 38IN - [id number] Implant SUT FW NMBR5 38IN  [id number] [id number] Left 1 Implanted       Specimen:          None        Findings: Complete rupture left quad tendon    Complications: None    Description of Procedure: After obtaining informed consent, patient brought the operating room where undergoes general anesthesia.  Had a preoperative antibiotic.  Had a preoperative femoral nerve block.  Prepped and draped in sterile fashion.  Esmarch used single limb.  Tourniquet was inflated.  She is made directly over the patella and quadriceps tendon.  Status exam.  There is complete rupture of the tendon.  The superior pole of the patella was cleaned with a rongeur to expose the bony surface.  Attention then turned to quad tendon where 2 FiberWire sutures were then placed down the center and then up to side in a [place] stitch fashion.  3 drill holes were then drilled to the patella.  The suture was then passed using a suture passer.  These were then tied with the knee in extension.  Good repair was achieved.  Thoroughly irrigated.  Closed using #1 Vicryl, 2-0 Vicryl, staples.  A sterile dressing was applied he was then placed into a hinged knee brace with the brace  locked.  He was then awakened general anesthesia and taken recovery in stable condition                Assistant: Katy Brown  was responsible for performing the following activities: Retraction, Suction, Irrigation, Closing, and Placing Dressing and their skilled assistance was necessary for the success of this case.    Emory Henriquez MD     Date: 6/12/2024  Time: 10:02 EDT

## 2024-06-21 DIAGNOSIS — S76.112A QUADRICEPS TENDON RUPTURE, LEFT, INITIAL ENCOUNTER: ICD-10-CM

## 2024-06-21 RX ORDER — HYDROCODONE BITARTRATE AND ACETAMINOPHEN 7.5; 325 MG/1; MG/1
1 TABLET ORAL EVERY 4 HOURS PRN
Qty: 42 TABLET | Refills: 0 | Status: SHIPPED | OUTPATIENT
Start: 2024-06-21

## 2024-06-21 NOTE — TELEPHONE ENCOUNTER
Caller: MELBA    Relationship: WIFE    Best call back number: [phone]    Requested Prescriptions:   Requested Prescriptions     Pending Prescriptions Disp Refills    HYDROcodone-acetaminophen (NORCO) 7.5-325 MG per tablet 30 tablet 0     Sig: Take 1-2 tablets by mouth Every 4 (Four) Hours As Needed for Moderate Pain (Pain).        Pharmacy where request should be sent:    [address]   Last office visit with prescribing clinician: 6/6/2024   Last telemedicine visit with prescribing clinician: Visit date not found   Next office visit with prescribing clinician: 7/25/2024     Additional details provided by patient: WIFE STATES HIS BRACE WILL NOT STAY UP ON LEG- PAIN LEVEL AT 7- WANTS TO KNOW WHY HE IS IN SO MUCH PAIN-    Does the patient have less than a 3 day supply:  [x] Yes  [] No    Would you like a call back once the refill request has been completed: [x] Yes [] No    If the office needs to give you a call back, can they leave a voicemail: [x] Yes [] No    Marshall Rudolph Rep   06/21/24 09:59 EDT

## 2024-06-27 ENCOUNTER — OFFICE VISIT (OUTPATIENT)
Dept: ORTHOPEDIC SURGERY | Facility: CLINIC | Age: 74
End: 2024-06-27
Payer: MEDICARE

## 2024-06-27 VITALS
WEIGHT: 229.28 LBS | OXYGEN SATURATION: 94 % | HEART RATE: 51 BPM | DIASTOLIC BLOOD PRESSURE: 63 MMHG | SYSTOLIC BLOOD PRESSURE: 167 MMHG | HEIGHT: 71 IN | BODY MASS INDEX: 32.1 KG/M2

## 2024-06-27 DIAGNOSIS — S76.112A QUADRICEPS TENDON RUPTURE, LEFT, INITIAL ENCOUNTER: ICD-10-CM

## 2024-06-27 DIAGNOSIS — Z47.89 AFTERCARE FOLLOWING SURGERY OF THE MUSCULOSKELETAL SYSTEM: Primary | ICD-10-CM

## 2024-06-27 PROCEDURE — 99024 POSTOP FOLLOW-UP VISIT: CPT

## 2024-06-27 PROCEDURE — 3078F DIAST BP <80 MM HG: CPT

## 2024-06-27 PROCEDURE — 3077F SYST BP >= 140 MM HG: CPT

## 2024-06-27 NOTE — PROGRESS NOTES
"Chief Complaint  Pain and Follow-up of the Left Thigh    Subjective      Taqueria Soto presents to [facility] for follow up of patient is 2 weeks status post left quad tendon repair performed Dr. Henriquez on 6/12/2024..  Patient arrived today with use of a walker.  Patient also had Ace bandage and hinged brace intact.  Patient states his pain is tolerable.  Patient states that he has been wearing his hinged brace specifically with ambulation.  He has been icing and elevating frequently.  Overall he states that he has been doing well.  He has not began physical therapy yet.      Allergies   Allergen Reactions    Nitroglycerin Other (See Comments)     Heart rate dropped down         Objective     Vital Signs:   Vitals:    06/27/24 1431   BP: 167/63   Pulse: 51   SpO2: 94%   Weight: 104 kg (229 lb 4.5 oz)   Height: 180.3 cm (71\")     Body mass index is 31.98 kg/m².    I reviewed the patient's chief complaint, history of present illness, review of systems, past medical history, surgical history, family history, social history, medications, and allergy list.     REVIEW OF SYSTEMS    Constitutional: Denies fevers, chills, weight loss  Cardiovascular: Denies chest pain, shortness of breath  Skin: Denies rashes, acute skin changes  Neurologic: Denies headache, loss of consciousness  MSK: Left leg pain.     Ortho Exam  Knee Scope   General: Alert, no acute distress  Left lower extremity: Sutures removed today without complications.  Well-healing incision about left knee. No redness or active drainage noted.  Mild knee effusion.  80 degrees of passive flexion.  0 degrees passive extension.  Calf soft, nontender. Demonstrates active ankle plantarflexion and dorsiflexion. Sensation intact over the dorsal and plantar foot.  Palpable pedal pulses.                   Imaging Results (Most Recent)       None                Assessment and Plan   Diagnoses and all orders for this visit:    1. Aftercare " following surgery of the musculoskeletal system (Primary)  -     Ambulatory Referral to Physical Therapy  -     Ambulatory Referral to Physical Therapy    2. Quadriceps tendon rupture, left, initial encounter  -     Ambulatory Referral to Physical Therapy         Taqueria Soto presents today to [facility] for a follow up on their Left leg. They are two weeks post op left quadricep tendon repair performed Dr. Henriquez on 6/12/2024.  Patient is here today with the use of a walker. Sutures/staples were removed today without complication. Steri-strips applied. Incisions are well-healing without the evidence of infection. No active redness or drainage noted. No concerning signs of infection.  Instructed patient that steri strips will fall off on their own, in approximately 7-10 days. If they have not fallen off in 10 days, the patient should remove them. Incision site care was reviewed today. Patient can wash incision with soap and water but not to soak or submerge incision in tub, pools, lakes or hot tubs. Do not apply any lotions, creams, or ointments to the incisions at this time.      Patient instructed to begin physical therapy.  Order placed today.  Additionally, hinged knee brace needs to be worn at all times with ambulation, locked in extension.  Patient was advised to do no single leg lifts without hinged brace locked in extension.  Patient can do ankle pumps and ensure active range of motion with the ankle is maintained.  Patient is going to continue walker until determined by physical therapist.  Physical therapist will also begin to work on knee range of motion, passively.  No active knee extension.  This was all demonstrated and discussed with patient today in office.    We also discussed the use of Zynex needed to help prevent significant quad atrophy and promote healing/pain relief.  Order was placed today.     Patient is encouraged to continue icing and elevation for pain/swelling.      Patient will  follow up in 4 weeks. No x-rays needed at follow up.               Tobacco Use: Medium Risk (6/12/2024)     Patient History      Smoking Tobacco Use: Former      Smokeless Tobacco Use: Never      Passive Exposure: Not on file      Patient reports they have a history of tobacco use; encouraged continued tobacco cessation for further health benefits.               Follow Up   No follow-ups on file.  There are no Patient Instructions on file for this visit.  Patient was given instructions and counseling regarding his condition or for health maintenance advice. Please see specific information pulled into the AVS if appropriate.         Dictated Utilizing Dragon Dictation. Please note that portions of this note were completed with a voice recognition program. Part of this note may be an electronic transcription/translation of spoken language to printed text using the Dragon Dictation System.           Tobacco Use: Medium Risk (6/27/2024)    Patient History     Smoking Tobacco Use: Former     Smokeless Tobacco Use: Never     Passive Exposure: Not on file     Patient reports they have a history of tobacco use; encouraged continued tobacco cessation for further health benefits.            Follow Up   Return in about 3 weeks (around 7/18/2024).  Patient Instructions   Patient instructed to begin physical therapy. Order placed today. Additionally, hinged knee brace needs to be worn at all times with ambulation, locked in extension. Patient was advised to do no single leg lifts without hinged brace locked in extension. Patient can do ankle pumps and ensure active range of motion with the ankle is maintained. Patient is going to continue walker until determined by physical therapist. Physical therapist will also begin to work on knee range of motion, passively. No active knee extension. This was all demonstrated and discussed with patient today in office.   Patient was given instructions and counseling regarding his condition  or for health maintenance advice. Please see specific information pulled into the AVS if appropriate.       Dictated Utilizing Dragon Dictation. Please note that portions of this note were completed with a voice recognition program. Part of this note may be an electronic transcription/translation of spoken language to printed text using the Dragon Dictation System.

## 2024-06-27 NOTE — PATIENT INSTRUCTIONS
Patient instructed to begin physical therapy. Order placed today. Additionally, hinged knee brace needs to be worn at all times with ambulation, locked in extension. Patient was advised to do no single leg lifts without hinged brace locked in extension. Patient can do ankle pumps and ensure active range of motion with the ankle is maintained. Patient is going to continue walker until determined by physical therapist. Physical therapist will also begin to work on knee range of motion, passively. No active knee extension. This was all demonstrated and discussed with patient today in office.

## 2024-07-08 DIAGNOSIS — Z47.89 AFTERCARE FOLLOWING SURGERY OF THE MUSCULOSKELETAL SYSTEM: Primary | ICD-10-CM

## 2024-07-08 DIAGNOSIS — S76.112A QUADRICEPS TENDON RUPTURE, LEFT, INITIAL ENCOUNTER: ICD-10-CM

## 2024-07-08 NOTE — PROGRESS NOTES
[address] [address]  [place]   [place] [address]  Phone: [phone]  Fax: [phone]      SLEEP CLINIC FOLLOW-UP PROGRESS NOTE    Taqueria Soto  [id number]   1950  74 y.o.  male      PCP: Edgar Briscoe APRN      Date of visit: 7/9/2024        CHIEF COMPLAINT: Obstructive sleep apnea    HPI:  This is a 74 y.o. year old patient who presents to the clinic today for the management of obstructive sleep apnea.  Patient had a(n) sleep study 9/2023 showing severe obstructive sleep apnea with AHI of 41.8/hr.  Patient had trouble tolerating PAP therapy and underwent Inspire implantation with Dr. Vang 4/17/2024.    Patient was seen back in the office 5/30/2024 and doing well without issues or complications.  Device was activated in the office.  Patient presents today for follow-up.  This is his initial follow-up after device activation.  He made it up to a level 2.  Tolerating well with good usage of device.  We increased him to a level 3 in the office today.  We discussed continuing to work the steps and increase as tolerated but notifying office of any issues or concerns with this.  We have increased start delay to 45 minutes, per patient request.  He is noticing improvement with device.  Tolerating well and pleased overall.              INSPIRE DEVICE OVERVIEW:  Implantation date: 4/17/2024, Dr. Vang  Activation date: 5/30/2024  Functional capacity: 1.0 volts  Voltage range: 1.0-2.0 volts  Start delay: 30 min --- increased to 45min per patient request  Duration of time: 9 h  Pause time: 15 min        MEDICATIONS: reviewed     ALLERGIES:  Nitroglycerin    SOCIAL HISTORY (habits pertaining to sleep medicine):  Tobacco use: No   Alcohol use: 1 per week  Caffeine use: 2    REVIEW OF SYSTEMS:   Pertinent positive symptoms:  [place] Sleepiness Scale: Total score: 7   Postnasal drip        PHYSICAL EXAMINATION:  CONSTITUTIONAL:   Vitals:    07/09/24 0900   Pulse: 70   SpO2: 97%  "  Weight: 98.3 kg (216 lb 12.8 oz)   Height: 180.3 cm (70.98\")    Body mass index is 30.25 kg/m².   HEAD: atraumatic, normocephalic  RESP SYSTEM: Not in respiratory distress, breathing unlabored  CARDIOVASULAR: No edema noted  NEURO: Alert and oriented x 3, gait normal, mood and affect appeared appropriate            ASSESSMENT AND PLAN:  Obstructive sleep apnea s/p insertion of hypoglossal nerve stimulator: Increase start delay from 30 to 45 minutes, per patient request.  We have increased patient to a level 3.  He will continue to increase steps as tolerated notify office of any issues or concerns prior to follow-up.  Will have patient follow-up in 8 weeks with Dr. Cabrera or sooner for issues or concerns.  Intolerance of continuous positive airway pressure (CPAP)  Obesity: Body mass index is 30.25 kg/m².. Patients who are overweight or obese are at increased risk of sleep apnea/ sleep disordered breathing. Weight reduction and healthy lifestyle are encouraged in overweight/ obese patients as part of a comprehensive approach to sleep apnea treatment.        Patient will follow-up in 8 weeks with Dr. Cabrera or follow-up sooner for any issues or concerns.  Discussed signs/symptoms that would warrant sooner call or follow-up to the office.  Patient's questions were answered.        Thank you for allowing me to participate in the care of this patient.    Angelica Tovar DNP, [facility]    "

## 2024-07-09 ENCOUNTER — OFFICE VISIT (OUTPATIENT)
Dept: SLEEP MEDICINE | Facility: HOSPITAL | Age: 74
End: 2024-07-09
Payer: MEDICARE

## 2024-07-09 VITALS — HEIGHT: 71 IN | WEIGHT: 216.8 LBS | OXYGEN SATURATION: 97 % | HEART RATE: 70 BPM | BODY MASS INDEX: 30.35 KG/M2

## 2024-07-09 DIAGNOSIS — Z78.9 INTOLERANCE OF CONTINUOUS POSITIVE AIRWAY PRESSURE (CPAP) VENTILATION: ICD-10-CM

## 2024-07-09 DIAGNOSIS — Z96.82 STATUS POST INSERTION OF HYPOGLOSSAL NERVE STIMULATOR: ICD-10-CM

## 2024-07-09 DIAGNOSIS — G47.33 SEVERE OBSTRUCTIVE SLEEP APNEA: Primary | ICD-10-CM

## 2024-07-09 DIAGNOSIS — Z46.2 ENCOUNTER FOR INTERROGATION OF NEUROSTIMULATOR: ICD-10-CM

## 2024-07-09 DIAGNOSIS — E66.9 OBESITY (BMI 30-39.9): ICD-10-CM

## 2024-07-09 PROCEDURE — 99214 OFFICE O/P EST MOD 30 MIN: CPT | Performed by: NURSE PRACTITIONER

## 2024-07-09 PROCEDURE — G0463 HOSPITAL OUTPT CLINIC VISIT: HCPCS

## 2024-07-09 PROCEDURE — 1160F RVW MEDS BY RX/DR IN RCRD: CPT | Performed by: NURSE PRACTITIONER

## 2024-07-09 PROCEDURE — 1159F MED LIST DOCD IN RCRD: CPT | Performed by: NURSE PRACTITIONER

## 2024-07-10 ENCOUNTER — TELEPHONE (OUTPATIENT)
Dept: SLEEP MEDICINE | Facility: HOSPITAL | Age: 74
End: 2024-07-10
Payer: MEDICARE

## 2024-07-10 NOTE — TELEPHONE ENCOUNTER
Pt came by the office today to have his pressure on his Inspire device decreased.  Melvi and I were able to face time with Machelle and were told we only needed to use his remote to lower him back to a 2.  She stated to the pt, that staying at the 2 for a long period of time will cause him to be pushed back in time for his next sleep study. Pt was agreeable and states he has a lot going on and wants to wait.

## 2024-07-25 ENCOUNTER — OFFICE VISIT (OUTPATIENT)
Dept: ORTHOPEDIC SURGERY | Facility: CLINIC | Age: 74
End: 2024-07-25
Payer: MEDICARE

## 2024-07-25 VITALS
SYSTOLIC BLOOD PRESSURE: 126 MMHG | OXYGEN SATURATION: 96 % | HEART RATE: 61 BPM | BODY MASS INDEX: 30.24 KG/M2 | HEIGHT: 71 IN | DIASTOLIC BLOOD PRESSURE: 76 MMHG | WEIGHT: 216 LBS

## 2024-07-25 DIAGNOSIS — Z47.89 AFTERCARE FOLLOWING SURGERY OF THE MUSCULOSKELETAL SYSTEM: Primary | ICD-10-CM

## 2024-07-25 DIAGNOSIS — M17.0 BILATERAL PRIMARY OSTEOARTHRITIS OF KNEE: ICD-10-CM

## 2024-07-25 NOTE — PROGRESS NOTES
"Chief Complaint  Follow-up and Pain of the Left Knee and Follow-up and Pain of the Right Knee     Subjective      Taqueria Soto presents to [facility] for follow up of bilateral knees.  He had a quadriceps tendon repair on 24.  He is in physical therapy for that.  He is in a hinged knee brace and is doing well. He on 23 had a right knee Synvisc injection.  He notes the injection is helpful.  Due to the quadriceps tendon repair he cannot have a steroid injection for a couple of months.      Allergies   Allergen Reactions    Nitroglycerin Other (See Comments)     Heart rate dropped down          Social History     Socioeconomic History    Marital status:    Tobacco Use    Smoking status: Former     Current packs/day: 0.00     Average packs/day: 1 pack/day for 50.0 years (50.0 ttl pk-yrs)     Types: Cigarettes     Start date:      Quit date:      Years since quittin.5    Smokeless tobacco: Never   Vaping Use    Vaping status: Never Used   Substance and Sexual Activity    Alcohol use: Yes     Comment: occas    Drug use: Never    Sexual activity: Defer        I reviewed the patient's chief complaint, history of present illness, review of systems, past medical history, surgical history, family history, social history, medications, and allergy list.     Review of Systems     Constitutional: Denies fevers, chills, weight loss  Cardiovascular: Denies chest pain, shortness of breath  Skin: Denies rashes, acute skin changes  Neurologic: Denies headache, loss of consciousness        Vital Signs:   /76   Pulse 61   Ht 180.3 cm (71\")   Wt 98 kg (216 lb)   SpO2 96%   BMI 30.13 kg/m²          Physical Exam  General: Alert. No acute distress    Ortho Exam        Bilateral knees: Range of motion 0 to 110 degrees. Stable to varus and valgus stress. Stable to anterior posterior drawer test. Neurovascular intact. No edema noted. Tender to palpation the medial and " lateral joint line. No edema noted. He is wearing a left knee hinged brace that is -10 - 70.        Procedures      Imaging Results (Most Recent)       None             Result Review :             Assessment and Plan     Diagnoses and all orders for this visit:    1. Aftercare following surgery of the musculoskeletal system (Primary)    2. Bilateral primary osteoarthritis of knee        Discussed the treatment plan with the patient.     Continue physical therapy.  Hinged knee brace opened to Monday.      Due to the quadriceps tendon repair he cannot have a steroid injection for a couple of months.      Call or return if worsening symptoms.    Follow Up     4-6 weeks.       Patient was given instructions and counseling regarding his condition or for health maintenance advice. Please see specific information pulled into the AVS if appropriate.     Scribed for Emory Henriquez MD by Lalitha Serna MA.  07/25/24   09:37 EDT    I have personally performed the services described in this document as scribed by the above individual and it is both accurate and complete. Emory Henriquez MD 07/25/24

## 2024-07-30 ENCOUNTER — TELEPHONE (OUTPATIENT)
Dept: FAMILY MEDICINE CLINIC | Facility: CLINIC | Age: 74
End: 2024-07-30
Payer: MEDICARE

## 2024-07-30 DIAGNOSIS — Z79.4 TYPE 2 DIABETES MELLITUS WITH HYPERGLYCEMIA, WITH LONG-TERM CURRENT USE OF INSULIN: Primary | ICD-10-CM

## 2024-07-30 DIAGNOSIS — E11.65 TYPE 2 DIABETES MELLITUS WITH HYPERGLYCEMIA, WITH LONG-TERM CURRENT USE OF INSULIN: Primary | ICD-10-CM

## 2024-07-30 RX ORDER — BLOOD-GLUCOSE METER
KIT MISCELLANEOUS
Qty: 100 EACH | Refills: 1 | Status: SHIPPED | OUTPATIENT
Start: 2024-07-30

## 2024-07-30 RX ORDER — BLOOD-GLUCOSE METER
1 KIT MISCELLANEOUS DAILY
Qty: 1 KIT | Refills: 0 | Status: SHIPPED | OUTPATIENT
Start: 2024-07-30

## 2024-07-30 NOTE — TELEPHONE ENCOUNTER
Brenda called, they are needing a new meter to go with the new test strips. Please send to [facility] [place]

## 2024-07-30 NOTE — TELEPHONE ENCOUNTER
\[facility]\"" no longer makes precision xtra test strips. Patient is needing freestlye lite. Script has been printed and givent to pt.

## 2024-08-08 ENCOUNTER — LAB (OUTPATIENT)
Dept: LAB | Facility: HOSPITAL | Age: 74
End: 2024-08-08
Payer: MEDICARE

## 2024-08-08 DIAGNOSIS — E11.65 TYPE 2 DIABETES MELLITUS WITH HYPERGLYCEMIA, WITH LONG-TERM CURRENT USE OF INSULIN: ICD-10-CM

## 2024-08-08 DIAGNOSIS — Z79.4 TYPE 2 DIABETES MELLITUS WITH HYPERGLYCEMIA, WITH LONG-TERM CURRENT USE OF INSULIN: ICD-10-CM

## 2024-08-08 DIAGNOSIS — I10 ESSENTIAL HYPERTENSION: ICD-10-CM

## 2024-08-08 DIAGNOSIS — Z12.5 SCREENING FOR PROSTATE CANCER: ICD-10-CM

## 2024-08-08 LAB
ALBUMIN SERPL-MCNC: 4.3 G/DL (ref 3.5–5.2)
ALBUMIN UR-MCNC: <1.2 MG/DL
ALBUMIN/GLOB SERPL: 1.5 G/DL
ALP SERPL-CCNC: 58 U/L (ref 39–117)
ALT SERPL W P-5'-P-CCNC: 10 U/L (ref 1–41)
ANION GAP SERPL CALCULATED.3IONS-SCNC: 11.7 MMOL/L (ref 5–15)
AST SERPL-CCNC: 22 U/L (ref 1–40)
BILIRUB SERPL-MCNC: 0.5 MG/DL (ref 0–1.2)
BUN SERPL-MCNC: 10 MG/DL (ref 8–23)
BUN/CREAT SERPL: 11.6 (ref 7–25)
CALCIUM SPEC-SCNC: 9.9 MG/DL (ref 8.6–10.5)
CHLORIDE SERPL-SCNC: 103 MMOL/L (ref 98–107)
CHOLEST SERPL-MCNC: 132 MG/DL (ref 0–200)
CO2 SERPL-SCNC: 22.3 MMOL/L (ref 22–29)
CREAT SERPL-MCNC: 0.86 MG/DL (ref 0.76–1.27)
CREAT UR-MCNC: 78.7 MG/DL
DEPRECATED RDW RBC AUTO: 44.7 FL (ref 37–54)
EGFRCR SERPLBLD CKD-EPI 2021: 89.7 ML/MIN/1.73
ERYTHROCYTE [DISTWIDTH] IN BLOOD BY AUTOMATED COUNT: 14.1 % (ref 12.3–15.4)
GLOBULIN UR ELPH-MCNC: 2.8 GM/DL
GLUCOSE SERPL-MCNC: 88 MG/DL (ref 65–99)
HCT VFR BLD AUTO: 44.8 % (ref 37.5–51)
HDLC SERPL-MCNC: 49 MG/DL (ref 40–60)
HGB BLD-MCNC: 14.6 G/DL (ref 13–17.7)
LDLC SERPL CALC-MCNC: 66 MG/DL (ref 0–100)
LDLC/HDLC SERPL: 1.33 {RATIO}
MCH RBC QN AUTO: 29 PG (ref 26.6–33)
MCHC RBC AUTO-ENTMCNC: 32.6 G/DL (ref 31.5–35.7)
MCV RBC AUTO: 88.9 FL (ref 79–97)
MICROALBUMIN/CREAT UR: NORMAL MG/G{CREAT}
PLATELET # BLD AUTO: 202 10*3/MM3 (ref 140–450)
PMV BLD AUTO: 11.9 FL (ref 6–12)
POTASSIUM SERPL-SCNC: 4.1 MMOL/L (ref 3.5–5.2)
PROT SERPL-MCNC: 7.1 G/DL (ref 6–8.5)
PSA SERPL-MCNC: 0.07 NG/ML (ref 0–4)
RBC # BLD AUTO: 5.04 10*6/MM3 (ref 4.14–5.8)
SODIUM SERPL-SCNC: 137 MMOL/L (ref 136–145)
TRIGL SERPL-MCNC: 88 MG/DL (ref 0–150)
TSH SERPL DL<=0.05 MIU/L-ACNC: 1.76 UIU/ML (ref 0.27–4.2)
VLDLC SERPL-MCNC: 17 MG/DL (ref 5–40)
WBC NRBC COR # BLD AUTO: 9.71 10*3/MM3 (ref 3.4–10.8)

## 2024-08-08 PROCEDURE — 82043 UR ALBUMIN QUANTITATIVE: CPT

## 2024-08-08 PROCEDURE — 80053 COMPREHEN METABOLIC PANEL: CPT

## 2024-08-08 PROCEDURE — G0103 PSA SCREENING: HCPCS

## 2024-08-08 PROCEDURE — 82570 ASSAY OF URINE CREATININE: CPT

## 2024-08-08 PROCEDURE — 85027 COMPLETE CBC AUTOMATED: CPT

## 2024-08-08 PROCEDURE — 80061 LIPID PANEL: CPT

## 2024-08-08 PROCEDURE — 84443 ASSAY THYROID STIM HORMONE: CPT

## 2024-08-08 PROCEDURE — 36415 COLL VENOUS BLD VENIPUNCTURE: CPT

## 2024-08-12 ENCOUNTER — OFFICE VISIT (OUTPATIENT)
Dept: FAMILY MEDICINE CLINIC | Facility: CLINIC | Age: 74
End: 2024-08-12
Payer: MEDICARE

## 2024-08-12 VITALS
DIASTOLIC BLOOD PRESSURE: 64 MMHG | BODY MASS INDEX: 31.98 KG/M2 | TEMPERATURE: 96.3 F | WEIGHT: 228.4 LBS | SYSTOLIC BLOOD PRESSURE: 116 MMHG | OXYGEN SATURATION: 96 % | HEIGHT: 71 IN | HEART RATE: 84 BPM

## 2024-08-12 DIAGNOSIS — K21.9 GASTROESOPHAGEAL REFLUX DISEASE, UNSPECIFIED WHETHER ESOPHAGITIS PRESENT: ICD-10-CM

## 2024-08-12 DIAGNOSIS — T78.40XD ALLERGY, SUBSEQUENT ENCOUNTER: ICD-10-CM

## 2024-08-12 DIAGNOSIS — I10 ESSENTIAL HYPERTENSION: ICD-10-CM

## 2024-08-12 DIAGNOSIS — Z79.4 TYPE 2 DIABETES MELLITUS WITH HYPERGLYCEMIA, WITH LONG-TERM CURRENT USE OF INSULIN: ICD-10-CM

## 2024-08-12 DIAGNOSIS — E11.65 TYPE 2 DIABETES MELLITUS WITH HYPERGLYCEMIA, WITH LONG-TERM CURRENT USE OF INSULIN: ICD-10-CM

## 2024-08-12 DIAGNOSIS — E78.5 HYPERLIPIDEMIA, UNSPECIFIED HYPERLIPIDEMIA TYPE: ICD-10-CM

## 2024-08-12 DIAGNOSIS — N40.0 BENIGN PROSTATIC HYPERPLASIA WITHOUT LOWER URINARY TRACT SYMPTOMS: ICD-10-CM

## 2024-08-12 DIAGNOSIS — M54.12 CERVICAL RADICULOPATHY: ICD-10-CM

## 2024-08-12 LAB
EXPIRATION DATE: ABNORMAL
HBA1C MFR BLD: 5.8 % (ref 4.5–5.7)
Lab: ABNORMAL

## 2024-08-12 PROCEDURE — 3074F SYST BP LT 130 MM HG: CPT | Performed by: NURSE PRACTITIONER

## 2024-08-12 PROCEDURE — 99214 OFFICE O/P EST MOD 30 MIN: CPT | Performed by: NURSE PRACTITIONER

## 2024-08-12 PROCEDURE — 1159F MED LIST DOCD IN RCRD: CPT | Performed by: NURSE PRACTITIONER

## 2024-08-12 PROCEDURE — 3078F DIAST BP <80 MM HG: CPT | Performed by: NURSE PRACTITIONER

## 2024-08-12 PROCEDURE — G0439 PPPS, SUBSEQ VISIT: HCPCS | Performed by: NURSE PRACTITIONER

## 2024-08-12 PROCEDURE — 3044F HG A1C LEVEL LT 7.0%: CPT | Performed by: NURSE PRACTITIONER

## 2024-08-12 PROCEDURE — 1160F RVW MEDS BY RX/DR IN RCRD: CPT | Performed by: NURSE PRACTITIONER

## 2024-08-12 PROCEDURE — 83036 HEMOGLOBIN GLYCOSYLATED A1C: CPT | Performed by: NURSE PRACTITIONER

## 2024-08-12 PROCEDURE — 1125F AMNT PAIN NOTED PAIN PRSNT: CPT | Performed by: NURSE PRACTITIONER

## 2024-08-12 PROCEDURE — 1170F FXNL STATUS ASSESSED: CPT | Performed by: NURSE PRACTITIONER

## 2024-08-12 RX ORDER — ATORVASTATIN CALCIUM 40 MG/1
40 TABLET, FILM COATED ORAL DAILY
Qty: 90 TABLET | Refills: 1 | Status: SHIPPED | OUTPATIENT
Start: 2024-08-12

## 2024-08-12 RX ORDER — LANCETS 28 GAUGE
EACH MISCELLANEOUS
COMMUNITY
Start: 2024-07-30

## 2024-08-12 RX ORDER — MELOXICAM 15 MG/1
15 TABLET ORAL DAILY
Qty: 90 TABLET | Refills: 1 | Status: SHIPPED | OUTPATIENT
Start: 2024-08-12 | End: 2024-08-12 | Stop reason: SDUPTHER

## 2024-08-12 RX ORDER — TAMSULOSIN HYDROCHLORIDE 0.4 MG/1
1 CAPSULE ORAL DAILY
Qty: 90 CAPSULE | Refills: 1 | Status: SHIPPED | OUTPATIENT
Start: 2024-08-12

## 2024-08-12 RX ORDER — SITAGLIPTIN AND METFORMIN HYDROCHLORIDE 1000; 50 MG/1; MG/1
2 TABLET, FILM COATED, EXTENDED RELEASE ORAL DAILY
Qty: 180 TABLET | Refills: 1 | Status: SHIPPED | OUTPATIENT
Start: 2024-08-12

## 2024-08-12 RX ORDER — OMEPRAZOLE 40 MG/1
40 CAPSULE, DELAYED RELEASE ORAL DAILY
Qty: 90 CAPSULE | Refills: 1 | Status: SHIPPED | OUTPATIENT
Start: 2024-08-12

## 2024-08-12 RX ORDER — AMLODIPINE BESYLATE AND BENAZEPRIL HYDROCHLORIDE 10; 20 MG/1; MG/1
1 CAPSULE ORAL DAILY
Qty: 90 CAPSULE | Refills: 1 | Status: SHIPPED | OUTPATIENT
Start: 2024-08-12

## 2024-08-12 RX ORDER — GABAPENTIN 300 MG/1
300 CAPSULE ORAL 3 TIMES DAILY
Qty: 270 CAPSULE | Refills: 1 | Status: SHIPPED | OUTPATIENT
Start: 2024-08-12

## 2024-08-12 RX ORDER — FINASTERIDE 5 MG/1
5 TABLET, FILM COATED ORAL DAILY
Qty: 90 TABLET | Refills: 1 | Status: SHIPPED | OUTPATIENT
Start: 2024-08-12

## 2024-08-12 RX ORDER — TAMSULOSIN HYDROCHLORIDE 0.4 MG/1
1 CAPSULE ORAL DAILY
Qty: 90 CAPSULE | Refills: 1 | Status: SHIPPED | OUTPATIENT
Start: 2024-08-12 | End: 2024-08-12 | Stop reason: SDUPTHER

## 2024-08-12 RX ORDER — OMEPRAZOLE 40 MG/1
40 CAPSULE, DELAYED RELEASE ORAL DAILY
Qty: 90 CAPSULE | Refills: 1 | Status: SHIPPED | OUTPATIENT
Start: 2024-08-12 | End: 2024-08-12 | Stop reason: SDUPTHER

## 2024-08-12 RX ORDER — MONTELUKAST SODIUM 10 MG/1
10 TABLET ORAL NIGHTLY
Qty: 90 TABLET | Refills: 1 | Status: SHIPPED | OUTPATIENT
Start: 2024-08-12

## 2024-08-12 RX ORDER — CYCLOBENZAPRINE HCL 10 MG
10 TABLET ORAL 3 TIMES DAILY PRN
Qty: 270 TABLET | Refills: 1 | Status: SHIPPED | OUTPATIENT
Start: 2024-08-12

## 2024-08-12 RX ORDER — MELOXICAM 15 MG/1
15 TABLET ORAL DAILY
Qty: 90 TABLET | Refills: 1 | Status: SHIPPED | OUTPATIENT
Start: 2024-08-12

## 2024-08-12 RX ORDER — FEXOFENADINE HCL 180 MG/1
180 TABLET ORAL DAILY
Qty: 90 TABLET | Refills: 1 | Status: SHIPPED | OUTPATIENT
Start: 2024-08-12

## 2024-08-12 RX ORDER — GLIPIZIDE 2.5 MG/1
2.5 TABLET, EXTENDED RELEASE ORAL EVERY 24 HOURS
Qty: 90 TABLET | Refills: 1 | Status: SHIPPED | OUTPATIENT
Start: 2024-08-12 | End: 2024-08-12 | Stop reason: ALTCHOICE

## 2024-08-12 NOTE — PROGRESS NOTES
Subjective   The ABCs of the Annual Wellness Visit  Medicare Wellness Visit      Taqueria Soto is a 74 y.o. patient who presents for a Medicare Wellness Visit.    The following portions of the patient's history were reviewed and   updated as appropriate: allergies, current medications, past family history, past medical history, past social history, past surgical history, and problem list.    Compared to one year ago, the patient's physical   health is the same.  Compared to one year ago, the patient's mental   health is the same.    Recent Hospitalizations:  He was not admitted to the hospital during the last year.     Current Medical Providers:  Patient Care Team:  Edgar Briscoe APRN as PCP - General (Nurse Practitioner)  Rickey Malone MD as Consulting Physician (Pulmonary Disease)    Outpatient Medications Prior to Visit   Medication Sig Dispense Refill    albuterol sulfate  (90 Base) MCG/ACT inhaler Inhale 2 puffs Every 4 (Four) Hours As Needed for Wheezing or Shortness of Air. 18 g 5    Apoaequorin (Prevagen) 10 MG capsule Take 1 capsule by mouth Daily. LD 6/7/24      Blood Glucose Monitoring Suppl (FreeStyle Lite) w/Device kit Use 1 each Daily. 1 kit 0    brimonidine (ALPHAGAN) 0.15 % ophthalmic solution Administer 1 drop to both eyes 2 (Two) Times a Day.      glucose blood (FREESTYLE LITE) test strip Test blood sugar 3 times a day. DX E11.9 100 each 1    Insulin Pen Needle (Pen Needles) 31G X 5 MM misc Use 1 each Daily. DX e11.9 used with Lantus daily 100 each 1    Lancets (freestyle) lancets       multivitamin with minerals tablet tablet Take 1 tablet by mouth Daily. LD 6/7/24      multivitamins-minerals (PRESERVISION AREDS 2) capsule capsule Take 1 capsule by mouth Daily. (Patient taking differently: Take 1 capsule by mouth Daily. LD 6/7/24) 90 capsule 3    Refresh Celluvisc 1 % gel eye gel Administer 2 drops to both eyes Daily As Needed (dry eyes).      timolol (TIMOPTIC-XR) 0.5 % ophthalmic  gel-forming Administer 1 drop to the right eye 2 (Two) Times a Day.      amLODIPine-benazepril (LOTREL) 10-20 MG per capsule Take 1 capsule by mouth Daily. (Patient taking differently: Take 1 capsule by mouth Daily. LAST DOSE 6/11/24) 90 capsule 1    atorvastatin (LIPITOR) 40 MG tablet Take 1 tablet by mouth Daily. (Patient taking differently: Take 1 tablet by mouth Every Night.) 90 tablet 1    cyclobenzaprine (FLEXERIL) 10 MG tablet Take 1 tablet by mouth 3 (Three) Times a Day As Needed for Muscle Spasms. 270 tablet 1    fexofenadine (ALLEGRA) 180 MG tablet Take 1 tablet by mouth Daily. 90 tablet 1    finasteride (PROSCAR) 5 MG tablet Take 1 tablet by mouth Daily. 90 tablet 1    gabapentin (NEURONTIN) 300 MG capsule Take 1 capsule by mouth 3 (Three) Times a Day. 270 capsule 1    glipizide (Glucotrol XL) 2.5 MG 24 hr tablet Take 1 tablet by mouth Daily. 90 tablet 1    Insulin Glargine (LANTUS SOLOSTAR) 100 UNIT/ML injection pen Inject 46 Units under the skin into the appropriate area as directed Daily. (Patient taking differently: Inject 46 Units under the skin into the appropriate area as directed Daily. 23  UNITS FOR BS>70 6/12/24) 45 mL 3    meloxicam (MOBIC) 15 MG tablet Take 1 tablet by mouth Daily. 90 tablet 1    montelukast (SINGULAIR) 10 MG tablet Take 1 tablet by mouth Every Night. 90 tablet 1    omeprazole (priLOSEC) 40 MG capsule Take 1 capsule by mouth Daily. 90 capsule 1    SITagliptin-metFORMIN HCl ER (Janumet XR)  MG tablet Take 2 tablets by mouth Daily. (Patient taking differently: Take 2 tablets by mouth every night at bedtime. 6/11/24 PRIOR TO 6 P.M.) 180 tablet 1    tamsulosin (FLOMAX) 0.4 MG capsule 24 hr capsule Take 1 capsule by mouth Daily. 90 capsule 1    HYDROcodone-acetaminophen (NORCO) 7.5-325 MG per tablet Take 1 tablet by mouth Every 4 (Four) Hours As Needed for Moderate Pain (Pain). 42 tablet 0    ofloxacin (OCUFLOX) 0.3 % ophthalmic solution Administer 1 drop to both eyes Take  "As Directed. Uses 4 days before and 4 days after eye injections      PRECISION XTRA TEST STRIPS test strip 1 each by Other route 3 (Three) Times a Day. 600 each 1     No facility-administered medications prior to visit.     No opioid medication identified on active medication list. I have reviewed chart for other potential  high risk medication/s and harmful drug interactions in the elderly.      Aspirin is not on active medication list.  Aspirin use is not indicated based on review of current medical condition/s. Risk of harm outweighs potential benefits.  .    Patient Active Problem List   Diagnosis    Type 1 diabetes mellitus    Hyperlipidemia    Essential hypertension    Benign prostatic hyperplasia    Cervical radiculopathy    Diabetes mellitus without complication    High blood pressure    Chronic obstructive pulmonary disease    Hypertensive heart disease without congestive heart failure    Hemorrhoids    Gastroesophageal reflux disease    DDD (degenerative disc disease), cervical    Cervical spondylosis without myelopathy    Quadriceps tendon rupture    Quadriceps tendon rupture, left, initial encounter     Advance Care Planning Advance Directive is on file.  ACP discussion was held with the patient during this visit. Patient has an advance directive in EMR which is still valid.             Objective   Vitals:    08/12/24 0908   BP: 116/64   BP Location: Right arm   Patient Position: Sitting   Cuff Size: Adult   Pulse: 84   Temp: 96.3 °F (35.7 °C)   TempSrc: Temporal   SpO2: 96%   Weight: 104 kg (228 lb 6.4 oz)   Height: 180.3 cm (71\")   PainSc:   3       Estimated body mass index is 31.86 kg/m² as calculated from the following:    Height as of this encounter: 180.3 cm (71\").    Weight as of this encounter: 104 kg (228 lb 6.4 oz).            Does the patient have evidence of cognitive impairment? No  Lab Results   Component Value Date    TRIG 88 08/08/2024    HDL 49 08/08/2024    LDL 66 08/08/2024    VLDL 17 " 2024    HGBA1C 5.8 (A) 2024                                                                                                Health  Risk Assessment    Smoking Status:  Social History     Tobacco Use   Smoking Status Former    Current packs/day: 0.00    Average packs/day: 1 pack/day for 50.0 years (50.0 ttl pk-yrs)    Types: Cigarettes    Start date:     Quit date:     Years since quittin.6   Smokeless Tobacco Never     Alcohol Consumption:  Social History     Substance and Sexual Activity   Alcohol Use Yes    Comment: occas       Fall Risk Screen  STEADI Fall Risk Assessment was completed, and patient is at LOW risk for falls.Assessment completed on:2024    Depression Screenin/12/2024     9:12 AM   PHQ-2/PHQ-9 Depression Screening   Little Interest or Pleasure in Doing Things 0-->not at all   Feeling Down, Depressed or Hopeless 0-->not at all   PHQ-9: Brief Depression Severity Measure Score 0     Health Habits and Functional and Cognitive Screenin/12/2024     9:13 AM   Functional & Cognitive Status   Do you have difficulty preparing food and eating? No   Do you have difficulty bathing yourself, getting dressed or grooming yourself? No   Do you have difficulty using the toilet? No   Do you have difficulty moving around from place to place? No   Do you have trouble with steps or getting out of a bed or a chair? No   Current Diet Well Balanced Diet   Dental Exam Not up to date   Eye Exam Up to date   Exercise (times per week) 7 times per week   Current Exercises Include Other   Do you need help using the phone?  No   Are you deaf or do you have serious difficulty hearing?  Yes   Do you need help to go to places out of walking distance? Yes   Do you need help shopping? No   Do you need help preparing meals?  No   Do you need help with housework?  No   Do you need help with laundry? No   Do you need help taking your medications? No   Do you need help managing money? No   Do  you ever drive or ride in a car without wearing a seat belt? No   Have you felt unusual stress, anger or loneliness in the last month? No   Who do you live with? Spouse   If you need help, do you have trouble finding someone available to you? No   Have you been bothered in the last four weeks by sexual problems? No   Do you have difficulty concentrating, remembering or making decisions? No           Age-appropriate Screening Schedule:  Refer to the list below for future screening recommendations based on patient's age, sex and/or medical conditions. Orders for these recommended tests are listed in the plan section. The patient has been provided with a written plan.    Health Maintenance List  Health Maintenance   Topic Date Due    LUNG CANCER SCREENING  Never done    RSV Vaccine - Adults (1 - 1-dose 60+ series) Never done    COVID-19 Vaccine (7 - 2023-24 season) 03/15/2024    INFLUENZA VACCINE  08/01/2024    HEMOGLOBIN A1C  02/12/2025    BMI FOLLOWUP  06/27/2025    DIABETIC EYE EXAM  07/10/2025    LIPID PANEL  08/08/2025    URINE MICROALBUMIN  08/08/2025    ANNUAL WELLNESS VISIT  08/12/2025    TDAP/TD VACCINES (2 - Td or Tdap) 08/11/2031    HEPATITIS C SCREENING  Completed    Pneumococcal Vaccine 65+  Completed    ZOSTER VACCINE  Completed    COLORECTAL CANCER SCREENING  Discontinued                                                                                                                                                CMS Preventative Services Quick Reference  Risk Factors Identified During Encounter  None Identified    The above risks/problems have been discussed with the patient.  Pertinent information has been shared with the patient in the After Visit Summary.  An After Visit Summary and PPPS were made available to the patient.    Follow Up:   Next Medicare Wellness visit to be scheduled in 1 year.         Additional E&M Note during same encounter follows:  Patient has additional, significant, and  "separately identifiable condition(s)/problem(s) that require work above and beyond the Medicare Wellness Visit     Chief Complaint  Medicare Wellness-subsequent and Diabetes (6 month follow up)    Subjective   Diabetes      Taqueria is also being seen today for additional medical problem/s.  6-month follow-up for his diabetes, hypertension hyperlipidemia.  I did review recent lab work with the patient.  His current A1c is 5.8%.  I did discuss discontinuing his glipizide.  Patient verbalized understanding.  Blood pressure is 116/64.  Denies any chest pain shortness breath palpitations at this time.  He does request refills of all of his medications.  Denies any other concerns at this time                Objective   Vital Signs:  /64 (BP Location: Right arm, Patient Position: Sitting, Cuff Size: Adult)   Pulse 84   Temp 96.3 °F (35.7 °C) (Temporal)   Ht 180.3 cm (71\")   Wt 104 kg (228 lb 6.4 oz)   SpO2 96%   BMI 31.86 kg/m²   Physical Exam  Vitals reviewed.   Constitutional:       Appearance: Normal appearance.   Cardiovascular:      Rate and Rhythm: Normal rate and regular rhythm.      Pulses: Normal pulses.      Heart sounds: Normal heart sounds, S1 normal and S2 normal. No murmur heard.  Pulmonary:      Effort: Pulmonary effort is normal. No respiratory distress.      Breath sounds: Normal breath sounds.   Skin:     General: Skin is warm and dry.   Neurological:      Mental Status: He is alert and oriented to person, place, and time.   Psychiatric:         Attention and Perception: Attention normal.         Mood and Affect: Mood normal.         Behavior: Behavior normal.         The following data was reviewed by: ENOCH Rose on 08/12/2024:        Assessment and Plan               Essential hypertension  Hypertension is stable and controlled  Continue current treatment regimen.  Blood pressure will be reassessed in 6 months.  Hyperlipidemia, unspecified hyperlipidemia type   Lipid abnormalities are " stable    Plan:  Continue same medication/s without change.      Discussed medication dosage, use, side effects, and goals of treatment in detail.    Counseled patient on lifestyle modifications to help control hyperlipidemia.     Patient Treatment Goals:   LDL goal is under 100    Followup in 6 months.  Cervical radiculopathy    Allergy, subsequent encounter    Benign prostatic hyperplasia without lower urinary tract symptoms    Type 2 diabetes mellitus with hyperglycemia, with long-term current use of insulin  Diabetes is stable.   Medication changes per orders.  Diabetes will be reassessed in 6 months  Gastroesophageal reflux disease, unspecified whether esophagitis present    Orders Placed This Encounter   Procedures    CBC (No Diff)     Standing Status:   Future     Standing Expiration Date:   8/12/2025     Order Specific Question:   Release to patient     Answer:   Routine Release [[id number]]    Comprehensive Metabolic Panel     Standing Status:   Future     Standing Expiration Date:   8/12/2025     Order Specific Question:   Release to patient     Answer:   Routine Release [[id number]]    Hemoglobin A1c     Standing Status:   Future     Standing Expiration Date:   8/12/2025     Order Specific Question:   Release to patient     Answer:   Routine Release [[id number]]    Lipid Panel     Standing Status:   Future     Standing Expiration Date:   8/12/2025     Order Specific Question:   Release to patient     Answer:   Routine Release [[id number]]    Microalbumin / Creatinine Urine Ratio - Urine, Clean Catch     Standing Status:   Future     Standing Expiration Date:   8/12/2025     Order Specific Question:   Release to patient     Answer:   Routine Release [[id number]]    TSH+Free T4     Standing Status:   Future     Standing Expiration Date:   8/12/2025     Order Specific Question:   Release to patient     Answer:   Routine Release [[id number]]    POC Glycosylated Hemoglobin (Hb A1C)     Order Specific Question:    Release to patient     Answer:   Routine Release [[id number]]     New Medications Ordered This Visit   Medications    amLODIPine-benazepril (LOTREL) 10-20 MG per capsule     Sig: Take 1 capsule by mouth Daily.     Dispense:  90 capsule     Refill:  1    atorvastatin (LIPITOR) 40 MG tablet     Sig: Take 1 tablet by mouth Daily.     Dispense:  90 tablet     Refill:  1    cyclobenzaprine (FLEXERIL) 10 MG tablet     Sig: Take 1 tablet by mouth 3 (Three) Times a Day As Needed for Muscle Spasms.     Dispense:  270 tablet     Refill:  1    fexofenadine (ALLEGRA) 180 MG tablet     Sig: Take 1 tablet by mouth Daily.     Dispense:  90 tablet     Refill:  1    finasteride (PROSCAR) 5 MG tablet     Sig: Take 1 tablet by mouth Daily.     Dispense:  90 tablet     Refill:  1    gabapentin (NEURONTIN) 300 MG capsule     Sig: Take 1 capsule by mouth 3 (Three) Times a Day.     Dispense:  270 capsule     Refill:  1    Insulin Glargine (LANTUS SOLOSTAR) 100 UNIT/ML injection pen     Sig: Inject 46 Units under the skin into the appropriate area as directed Daily.     Dispense:  45 mL     Refill:  3    meloxicam (MOBIC) 15 MG tablet     Sig: Take 1 tablet by mouth Daily.     Dispense:  90 tablet     Refill:  1    montelukast (SINGULAIR) 10 MG tablet     Sig: Take 1 tablet by mouth Every Night.     Dispense:  90 tablet     Refill:  1    omeprazole (priLOSEC) 40 MG capsule     Sig: Take 1 capsule by mouth Daily.     Dispense:  90 capsule     Refill:  1    SITagliptin-metFORMIN HCl ER (Janumet XR)  MG tablet     Sig: Take 2 tablets by mouth Daily.     Dispense:  180 tablet     Refill:  1    tamsulosin (FLOMAX) 0.4 MG capsule 24 hr capsule     Sig: Take 1 capsule by mouth Daily.     Dispense:  90 capsule     Refill:  1          Follow Up   Return in about 6 months (around 2/12/2025).  Patient was given instructions and counseling regarding his condition or for health maintenance advice. Please see specific information pulled into  the AVS if appropriate.

## 2024-08-13 ENCOUNTER — OFFICE VISIT (OUTPATIENT)
Dept: SLEEP MEDICINE | Facility: HOSPITAL | Age: 74
End: 2024-08-13
Payer: MEDICARE

## 2024-08-13 ENCOUNTER — TELEPHONE (OUTPATIENT)
Dept: FAMILY MEDICINE CLINIC | Facility: CLINIC | Age: 74
End: 2024-08-13
Payer: MEDICARE

## 2024-08-13 VITALS — BODY MASS INDEX: 31.79 KG/M2 | HEIGHT: 71 IN | HEART RATE: 61 BPM | WEIGHT: 227.1 LBS | OXYGEN SATURATION: 98 %

## 2024-08-13 DIAGNOSIS — Z78.9 INTOLERANCE OF CONTINUOUS POSITIVE AIRWAY PRESSURE (CPAP) VENTILATION: ICD-10-CM

## 2024-08-13 DIAGNOSIS — G47.33 SEVERE OBSTRUCTIVE SLEEP APNEA: Primary | ICD-10-CM

## 2024-08-13 DIAGNOSIS — Z96.82 STATUS POST INSERTION OF HYPOGLOSSAL NERVE STIMULATOR: ICD-10-CM

## 2024-08-13 DIAGNOSIS — E66.9 OBESITY (BMI 30-39.9): ICD-10-CM

## 2024-08-13 DIAGNOSIS — Z46.2 ENCOUNTER FOR INTERROGATION OF NEUROSTIMULATOR: ICD-10-CM

## 2024-08-13 PROCEDURE — 1159F MED LIST DOCD IN RCRD: CPT | Performed by: FAMILY MEDICINE

## 2024-08-13 PROCEDURE — 99213 OFFICE O/P EST LOW 20 MIN: CPT | Performed by: FAMILY MEDICINE

## 2024-08-13 PROCEDURE — 1160F RVW MEDS BY RX/DR IN RCRD: CPT | Performed by: FAMILY MEDICINE

## 2024-08-13 PROCEDURE — G0463 HOSPITAL OUTPT CLINIC VISIT: HCPCS

## 2024-08-13 RX ORDER — GLIPIZIDE 2.5 MG/1
TABLET, EXTENDED RELEASE ORAL
COMMUNITY
Start: 2024-08-12

## 2024-08-13 NOTE — PROGRESS NOTES
Follow Up [facility] Note     Chief Complaint:  DARIO     Primary Care Physician: Edgar Briscoe APRN    Taqueria Soto is a 74 y.o.male; 9/13/2023 for study which showed overall AHI of 41.8 events per hour.  Referred to Dr. Vang for hypoglossal nerve stimulator placement.  Presents today for activation.      Implantation date: 4/17/2024     Reports no tongue pain.  No healing issues with incisions.  No pain or drainage.     Functional capacity:1.0Volts     Voltage range 1.0-2.0 V.  Start delay: 30 min  Duration time: 9 h  Pause time: 15 min    8/13/2024: Last visit was 7/9/2024 with ENOCH Thao for follow-up.  He was increased to level 3 at last visit and discussed working up the steps as tolerable.  Start delay was also increased to 45 minutes per patient request.  Patient went back down to level 2 since last visit.  Having pain issues waking up from sleep after procedure on knee.  This is getting better with time however.  Okay with moving up to level 3 today.    Current Medications:    Current Outpatient Medications:     albuterol sulfate  (90 Base) MCG/ACT inhaler, Inhale 2 puffs Every 4 (Four) Hours As Needed for Wheezing or Shortness of Air., Disp: 18 g, Rfl: 5    amLODIPine-benazepril (LOTREL) 10-20 MG per capsule, Take 1 capsule by mouth Daily., Disp: 90 capsule, Rfl: 1    Apoaequorin (Prevagen) 10 MG capsule, Take 1 capsule by mouth Daily. LD 6/7/24, Disp: , Rfl:     atorvastatin (LIPITOR) 40 MG tablet, Take 1 tablet by mouth Daily., Disp: 90 tablet, Rfl: 1    Blood Glucose Monitoring Suppl (FreeStyle Lite) w/Device kit, Use 1 each Daily., Disp: 1 kit, Rfl: 0    brimonidine (ALPHAGAN) 0.15 % ophthalmic solution, Administer 1 drop to both eyes 2 (Two) Times a Day., Disp: , Rfl:     cyclobenzaprine (FLEXERIL) 10 MG tablet, Take 1 tablet by mouth 3 (Three) Times a Day As Needed for Muscle Spasms., Disp: 270 tablet, Rfl: 1    fexofenadine (ALLEGRA) 180 MG tablet, Take 1 tablet by  mouth Daily., Disp: 90 tablet, Rfl: 1    finasteride (PROSCAR) 5 MG tablet, Take 1 tablet by mouth Daily., Disp: 90 tablet, Rfl: 1    gabapentin (NEURONTIN) 300 MG capsule, Take 1 capsule by mouth 3 (Three) Times a Day., Disp: 270 capsule, Rfl: 1    glipizide (GLUCOTROL XL) 2.5 MG 24 hr tablet, , Disp: , Rfl:     glucose blood (FREESTYLE LITE) test strip, Test blood sugar 3 times a day. DX E11.9, Disp: 100 each, Rfl: 1    Insulin Glargine (LANTUS SOLOSTAR) 100 UNIT/ML injection pen, Inject 46 Units under the skin into the appropriate area as directed Daily., Disp: 45 mL, Rfl: 3    Insulin Pen Needle (Pen Needles) 31G X 5 MM misc, Use 1 each Daily. DX e11.9 used with Lantus daily, Disp: 100 each, Rfl: 1    Lancets (freestyle) lancets, , Disp: , Rfl:     meloxicam (MOBIC) 15 MG tablet, Take 1 tablet by mouth Daily., Disp: 90 tablet, Rfl: 1    montelukast (SINGULAIR) 10 MG tablet, Take 1 tablet by mouth Every Night., Disp: 90 tablet, Rfl: 1    multivitamin with minerals tablet tablet, Take 1 tablet by mouth Daily. LD 6/7/24, Disp: , Rfl:     multivitamins-minerals (PRESERVISION AREDS 2) capsule capsule, Take 1 capsule by mouth Daily. (Patient taking differently: Take 1 capsule by mouth Daily. LD 6/7/24), Disp: 90 capsule, Rfl: 3    omeprazole (priLOSEC) 40 MG capsule, Take 1 capsule by mouth Daily., Disp: 90 capsule, Rfl: 1    Refresh Celluvisc 1 % gel eye gel, Administer 2 drops to both eyes Daily As Needed (dry eyes)., Disp: , Rfl:     SITagliptin-metFORMIN HCl ER (Janumet XR)  MG tablet, Take 2 tablets by mouth Daily., Disp: 180 tablet, Rfl: 1    tamsulosin (FLOMAX) 0.4 MG capsule 24 hr capsule, Take 1 capsule by mouth Daily., Disp: 90 capsule, Rfl: 1    timolol (TIMOPTIC-XR) 0.5 % ophthalmic gel-forming, Administer 1 drop to the right eye 2 (Two) Times a Day., Disp: , Rfl:    also entered in Sleep Questionnaire    Patient  has a past medical history of Abnormal EKG, Acid reflux, Arthritis, BPH (benign  "prostatic hyperplasia), Cataract, Cervical radiculopathy (2019), Cervicalgia (2019), COPD (chronic obstructive pulmonary disease), Deafness, Diabetes mellitus, type 2 (2018), Essential hypertension (2018), Facet arthritis of cervical region (2019), Hematuria, microscopic, Hemorrhoids, High cholesterol, Hyperlipidemia (2018), Insulin dependent type 2 diabetes mellitus, controlled, Left bundle branch block, Macular degeneration, bilateral, Prostatitis, Rupture of left quadriceps tendon, Sinus trouble, and Sleep apnea.    Social History:    Social History     Socioeconomic History    Marital status:    Tobacco Use    Smoking status: Former     Current packs/day: 0.00     Average packs/day: 1 pack/day for 50.0 years (50.0 ttl pk-yrs)     Types: Cigarettes     Start date:      Quit date:      Years since quittin.6    Smokeless tobacco: Never   Vaping Use    Vaping status: Never Used   Substance and Sexual Activity    Alcohol use: Yes     Comment: occas    Drug use: Never    Sexual activity: Defer       Allergies:  Nitroglycerin    Vital Signs:    Vitals:    24 0900   Pulse: 61   SpO2: 98%   Weight: 103 kg (227 lb 1.6 oz)   Height: 180.3 cm (70.98\")     Body mass index is 31.69 kg/m².    REVIEW OF SYSTEMS.  Full review of systems available on the intake form which is scanned in the media tab.  The relevant positive are noted below  Daytime excessive sleepiness with [place] Sleepiness Scale :Total score: 3   Snoring  See scanned media      Physical exam:  Vitals:    24 0900   Pulse: 61   SpO2: 98%   Weight: 103 kg (227 lb 1.6 oz)   Height: 180.3 cm (70.98\")    Body mass index is 31.69 kg/m².    HEENT: Head is atraumatic, normocephalic  Eyes: pupils are round equal and reacting to light and accommodation, conjunctiva normal  Throat: tongue normal  RESPIRATORY SYSTEM: Regular respirations  CARDIOVASULAR SYSTEM: Regular rate  EXTREMITES: No cyanosis, " clubbing  NEUROLOGICAL SYSTEM: Oriented x 3, no gross motor defects, gait normal    Impression:  1. Severe obstructive sleep apnea    2. Intolerance of continuous positive airway pressure (CPAP) ventilation    3. Status post insertion of hypoglossal nerve stimulator    4. Encounter for interrogation of neurostimulator    5. Obesity (BMI 30-39.9)        Waveform run with felisha Barnes.  Moved up to a level 3 today.  Move up as tolerated may be every 2 weeks.  Knee brace coming off in about 30 days.  Hopefully when the issue is resolved sleep will get better as well.  Snoring has resolved.  Follow-up in 2 months or sooner if needed.    Chaka Cabrera MD  Sleep Medicine  08/13/24  10:17 EDT

## 2024-08-13 NOTE — TELEPHONE ENCOUNTER
Patient came into office stating he was advised at appointment to stop taking a medication but unsure of which medication.     Per MA of PCP, stop glipizide. Patient aware

## 2024-08-23 ENCOUNTER — OFFICE VISIT (OUTPATIENT)
Dept: ORTHOPEDIC SURGERY | Facility: CLINIC | Age: 74
End: 2024-08-23
Payer: MEDICARE

## 2024-08-23 VITALS
BODY MASS INDEX: 31.79 KG/M2 | WEIGHT: 227.07 LBS | OXYGEN SATURATION: 97 % | SYSTOLIC BLOOD PRESSURE: 120 MMHG | DIASTOLIC BLOOD PRESSURE: 68 MMHG | HEIGHT: 71 IN | HEART RATE: 68 BPM

## 2024-08-23 DIAGNOSIS — S76.112A QUADRICEPS TENDON RUPTURE, LEFT, INITIAL ENCOUNTER: ICD-10-CM

## 2024-08-23 DIAGNOSIS — Z47.89 AFTERCARE FOLLOWING SURGERY OF THE MUSCULOSKELETAL SYSTEM: Primary | ICD-10-CM

## 2024-08-23 PROCEDURE — 1160F RVW MEDS BY RX/DR IN RCRD: CPT

## 2024-08-23 PROCEDURE — 3074F SYST BP LT 130 MM HG: CPT

## 2024-08-23 PROCEDURE — 3078F DIAST BP <80 MM HG: CPT

## 2024-08-23 PROCEDURE — 99024 POSTOP FOLLOW-UP VISIT: CPT

## 2024-08-23 PROCEDURE — 1159F MED LIST DOCD IN RCRD: CPT

## 2024-08-23 NOTE — PROGRESS NOTES
"Chief Complaint  Follow-up of the Left Knee    Subjective      Taqueria Soto presents to [facility] for follow up of his left leg.  Patient is 10 weeks status post left quad tendon repair performed Dr. Henriquez on 6/12/2024.  Patient arrives today without the use of any assist devices.  Patient arrived with his hinged brace intact.  Patient states that he is attending physical therapy at  and [facility] and doing well.  He denies any specific concerns today.     Allergies   Allergen Reactions    Nitroglycerin Other (See Comments)     Heart rate dropped down         Objective     Vital Signs:   Vitals:    08/23/24 1007 08/23/24 1054   BP: 120/68    Pulse: (!) 35 68   SpO2: 97%    Weight: 103 kg (227 lb 1.2 oz)    Height: 180.3 cm (71\")      Body mass index is 31.67 kg/m².    I reviewed the patient's chief complaint, history of present illness, review of systems, past medical history, surgical history, family history, social history, medications, and allergy list.     REVIEW OF SYSTEMS    Constitutional: Denies fevers, chills, weight loss  Cardiovascular: Denies chest pain, shortness of breath  Skin: Denies rashes, acute skin changes  Neurologic: Denies headache, loss of consciousness  MSK: Left knee pain.     Ortho Exam  Knee   General: Alert, no acute distress.   Left knee: No pain with passive hip ROM.  Incision well-healed.  Knee stable to varus/valgus stress.  Knee extensor mechanism intact.  -3 degrees knee extension. Flexion to 125 degrees. Calf soft, non-tender.  Sensation and neurovascularly intact.  Demonstrates active ankle dorsiflexion and plantarflexion.  Palpable pedal pulses.               Imaging Results (Most Recent)       None                Assessment and Plan   Diagnoses and all orders for this visit:    1. Aftercare following surgery of the musculoskeletal system (Primary)  -     Ambulatory Referral to Physical Therapy for Evaluation & Treatment    2. Quadriceps tendon " rupture, left, initial encounter  -     Ambulatory Referral to Physical Therapy for Evaluation & Treatment          Taqueria Soto presents today 10 weeks postop for left quad tendon repair performed by Dr. Henriquez on 6/12/2024.    Patient instructed to continue range of motion exercises of the knee at home as well as strengthening efforts with physical therapy.  Patient was transition out of hinged knee brace into a normal knee brace only to use with excessive ambulation and uneven ground.  Patient can continue to ice/elevate as needed for pain or swelling.  Physical therapy was reordered today for patient to continue.    Patient follow-up in 6 weeks.  Patient wishes to be evaluated for his bilateral knees at that time and receive injections if able.      Tobacco Use: Medium Risk (8/23/2024)    Patient History     Smoking Tobacco Use: Former     Smokeless Tobacco Use: Never     Passive Exposure: Not on file     Patient reports they have a history of tobacco use; encouraged continued tobacco cessation for further health benefits.            Follow Up   Return in about 6 weeks (around 10/4/2024).  There are no Patient Instructions on file for this visit.  Patient was given instructions and counseling regarding his condition or for health maintenance advice. Please see specific information pulled into the AVS if appropriate.       Dictated Utilizing Dragon Dictation. Please note that portions of this note were completed with a voice recognition program. Part of this note may be an electronic transcription/translation of spoken language to printed text using the Dragon Dictation System.

## 2024-08-26 ENCOUNTER — OFFICE VISIT (OUTPATIENT)
Dept: PULMONOLOGY | Facility: CLINIC | Age: 74
End: 2024-08-26
Payer: MEDICARE

## 2024-08-26 VITALS
RESPIRATION RATE: 16 BRPM | TEMPERATURE: 97.9 F | HEART RATE: 67 BPM | WEIGHT: 233.2 LBS | DIASTOLIC BLOOD PRESSURE: 62 MMHG | OXYGEN SATURATION: 94 % | HEIGHT: 71 IN | SYSTOLIC BLOOD PRESSURE: 118 MMHG | BODY MASS INDEX: 32.65 KG/M2

## 2024-08-26 DIAGNOSIS — J41.8 MIXED SIMPLE AND MUCOPURULENT CHRONIC BRONCHITIS: Primary | ICD-10-CM

## 2024-08-26 DIAGNOSIS — G47.33 OBSTRUCTIVE SLEEP APNEA: ICD-10-CM

## 2024-08-26 PROCEDURE — 3078F DIAST BP <80 MM HG: CPT | Performed by: INTERNAL MEDICINE

## 2024-08-26 PROCEDURE — 1159F MED LIST DOCD IN RCRD: CPT | Performed by: INTERNAL MEDICINE

## 2024-08-26 PROCEDURE — 99213 OFFICE O/P EST LOW 20 MIN: CPT | Performed by: INTERNAL MEDICINE

## 2024-08-26 PROCEDURE — 1160F RVW MEDS BY RX/DR IN RCRD: CPT | Performed by: INTERNAL MEDICINE

## 2024-08-26 PROCEDURE — 3074F SYST BP LT 130 MM HG: CPT | Performed by: INTERNAL MEDICINE

## 2024-08-26 NOTE — PROGRESS NOTES
Pulmonary Office Follow-up    Subjective     Taqueria Soto is seen today at the office for   Chief Complaint   Patient presents with    Follow-up     3 month    COPD    Sleep Apnea    Bronchitis         HPI  Taqueria Soto is a 74 y.o. male with a PMH significant for obstructive sleep apnea and COPD presents for follow-up patient has been doing well and denies any significant cough expectoration wheezing he works around the home and has had no chest pain fever cough or hemoptysis patient's sleep quality has been good      Tobacco use history:  Former smoker      Patient Active Problem List   Diagnosis    Type 1 diabetes mellitus    Hyperlipidemia    Essential hypertension    Benign prostatic hyperplasia    Cervical radiculopathy    Diabetes mellitus without complication    High blood pressure    Chronic obstructive pulmonary disease    Hypertensive heart disease without congestive heart failure    Hemorrhoids    Gastroesophageal reflux disease    DDD (degenerative disc disease), cervical    Cervical spondylosis without myelopathy    Quadriceps tendon rupture    Quadriceps tendon rupture, left, initial encounter       Review of Systems  Review of Systems   All other systems reviewed and are negative.    As described in the HPI. Otherwise, remainder of ROS (14 systems) were negative.    Medications, Allergies, Social, and Family Histories reviewed as per EMR.    Result Review :            Objective     Vitals:    08/26/24 0842   BP: 118/62   Pulse: 67   Resp: 16   Temp: 97.9 °F (36.6 °C)   SpO2: 94%         08/26/24  0842   Weight: 106 kg (233 lb 3.2 oz)       Physical Exam  Vitals and nursing note reviewed.   Constitutional:       Appearance: Normal appearance.   HENT:      Head: Normocephalic and atraumatic.      Nose: Nose normal.      Mouth/Throat:      Mouth: Mucous membranes are moist.      Pharynx: Oropharynx is clear.   Eyes:      Extraocular Movements: Extraocular movements intact.       Conjunctiva/sclera: Conjunctivae normal.      Pupils: Pupils are equal, round, and reactive to light.   Cardiovascular:      Rate and Rhythm: Normal rate and regular rhythm.      Pulses: Normal pulses.      Heart sounds: Normal heart sounds.   Pulmonary:      Effort: Pulmonary effort is normal.      Breath sounds: Normal breath sounds.   Abdominal:      General: Abdomen is flat. Bowel sounds are normal.      Palpations: Abdomen is soft.   Musculoskeletal:         General: Normal range of motion.      Cervical back: Normal range of motion and neck supple.   Skin:     General: Skin is warm.      Capillary Refill: Capillary refill takes 2 to 3 seconds.   Neurological:      General: No focal deficit present.      Mental Status: He is alert and oriented to person, place, and time.   Psychiatric:         Mood and Affect: Mood normal.         Behavior: Behavior normal.         XR Knee 3 View Left    Result Date: 6/5/2024  Impression: 1.Suggestion of disruption of quadriceps tendon on the patella. Correlate with clinical history and physical findings. 2.Mild tricompartmental osteoarthritis. Electronically Signed: Gentry Tucker MD  6/5/2024 3:11 PM EDT  Workstation ID: [id number]      Assessment & Plan     Diagnoses and all orders for this visit:    1. Mixed simple and mucopurulent chronic bronchitis (Primary)    2. Obstructive sleep apnea         Discussion/ Recommendations:   Patient appears to be stable and is doing well  Advise regular exercise and weight reduction his BMI is 32.54  Continue home medications  Vaccinations discussed and recommended             Return in about 6 months (around 2/26/2025).          This document has been electronically signed by Rickey Malone MD on August 26, 2024 08:51 EDT

## 2024-09-09 DIAGNOSIS — E11.65 TYPE 2 DIABETES MELLITUS WITH HYPERGLYCEMIA, WITH LONG-TERM CURRENT USE OF INSULIN: ICD-10-CM

## 2024-09-09 DIAGNOSIS — Z79.4 TYPE 2 DIABETES MELLITUS WITH HYPERGLYCEMIA, WITH LONG-TERM CURRENT USE OF INSULIN: ICD-10-CM

## 2024-09-09 RX ORDER — BLOOD-GLUCOSE METER
KIT MISCELLANEOUS
Qty: 300 EACH | Refills: 3 | Status: SHIPPED | OUTPATIENT
Start: 2024-09-09

## 2024-10-04 ENCOUNTER — OFFICE VISIT (OUTPATIENT)
Dept: ORTHOPEDIC SURGERY | Facility: CLINIC | Age: 74
End: 2024-10-04
Payer: MEDICARE

## 2024-10-04 VITALS
HEART RATE: 68 BPM | HEIGHT: 71 IN | DIASTOLIC BLOOD PRESSURE: 81 MMHG | SYSTOLIC BLOOD PRESSURE: 144 MMHG | BODY MASS INDEX: 32.62 KG/M2 | OXYGEN SATURATION: 97 % | WEIGHT: 233 LBS

## 2024-10-04 DIAGNOSIS — M17.0 BILATERAL PRIMARY OSTEOARTHRITIS OF KNEE: ICD-10-CM

## 2024-10-04 DIAGNOSIS — Z47.89 AFTERCARE FOLLOWING SURGERY OF THE MUSCULOSKELETAL SYSTEM: Primary | ICD-10-CM

## 2024-10-04 NOTE — PROGRESS NOTES
"Chief Complaint  Follow-up of the Left Knee and Follow-up of the Right Knee     Subjective      Taqueria Soto presents to [facility] for follow up of bilateral knees.  He had a left quad tendon repair on 24.  He is wearing a left knee brace.  His incision is doing well.  He is in therapy at [facility] in [place].  He is here with increase pain in the knees and increase difficulty with ambulation and stairs.     Allergies   Allergen Reactions    Nitroglycerin Other (See Comments)     Heart rate dropped down          Social History     Socioeconomic History    Marital status:    Tobacco Use    Smoking status: Former     Current packs/day: 0.00     Average packs/day: 1 pack/day for 50.0 years (50.0 ttl pk-yrs)     Types: Cigarettes     Start date:      Quit date:      Years since quittin.    Smokeless tobacco: Never   Vaping Use    Vaping status: Never Used   Substance and Sexual Activity    Alcohol use: Yes     Comment: occas    Drug use: Never    Sexual activity: Defer        I reviewed the patient's chief complaint, history of present illness, review of systems, past medical history, surgical history, family history, social history, medications, and allergy list.     Review of Systems     Constitutional: Denies fevers, chills, weight loss  Cardiovascular: Denies chest pain, shortness of breath  Skin: Denies rashes, acute skin changes  Neurologic: Denies headache, loss of consciousness      Vital Signs:   /81   Pulse 68   Ht 180.3 cm (70.98\")   Wt 106 kg (233 lb)   SpO2 97%   BMI 32.51 kg/m²          Physical Exam  General: Alert. No acute distress    Ortho Exam        BILATERAL KNEES Flexion 120. Extension 0. Stable to varus/valgus stress. Stable to anterior/posterior drawer. Neurovascularly intact.  Positive EHL, FHL, HS and TA. Sensation intact to light touch all 5 nerves of the foot. Ambulates with Antalgic gait. Patella is well tracking. Calf " supple, non-tender    Large Joint: R knee  Date/Time: 10/4/2024 9:28 AM  Consent given by: patient  Site marked: site marked  Timeout: Immediately prior to procedure a time out was called to verify the correct patient, procedure, equipment, support staff and site/side marked as required   Supporting Documentation  Indications: pain   Procedure Details  Location: knee - R knee  Preparation: Patient was prepped and draped in the usual sterile fashion  Needle gauge: 21 G.  Medications administered: 48 mg hylan 48 MG/6ML  Patient tolerance: patient tolerated the procedure well with no immediate complications      Large Joint: L knee  Date/Time: 10/4/2024 9:29 AM  Consent given by: patient  Site marked: site marked  Timeout: Immediately prior to procedure a time out was called to verify the correct patient, procedure, equipment, support staff and site/side marked as required   Supporting Documentation  Indications: pain   Procedure Details  Location: knee - L knee  Preparation: Patient was prepped and draped in the usual sterile fashion  Needle gauge: 21 G.  Medications administered: 48 mg hylan 48 MG/6ML  Patient tolerance: patient tolerated the procedure well with no immediate complications    This injection documentation was Scribed for Emory Henriquez MD by Yuliana Chirinos MA.  10/04/24   09:29 EDT     Imaging Results (Most Recent)       None             Result Review :          Assessment and Plan     Diagnoses and all orders for this visit:    1. Aftercare following surgery of the musculoskeletal system (Primary)    2. Bilateral primary osteoarthritis of knee        Discussed the treatment plan with the patient.     Discussed the risks and benefits of conservative measures. The patient expressed understanding and wished to proceed with bilateral knee Synvisc injections.  He tolerated the injections well.          Call or return if worsening symptoms.    Follow Up     PRN      Patient was given instructions and  counseling regarding his condition or for health maintenance advice. Please see specific information pulled into the AVS if appropriate.     Scribed for Emory Henriquez MD by Lalitha Serna MA.  10/04/24   09:12 EDT    I have personally performed the services described in this document as scribed by the above individual and it is both accurate and complete. Emory Henriquez MD 10/04/24

## 2024-10-15 ENCOUNTER — OFFICE VISIT (OUTPATIENT)
Dept: SLEEP MEDICINE | Facility: HOSPITAL | Age: 74
End: 2024-10-15
Payer: MEDICARE

## 2024-10-15 VITALS — WEIGHT: 234.4 LBS | OXYGEN SATURATION: 96 % | BODY MASS INDEX: 32.81 KG/M2 | HEART RATE: 75 BPM | HEIGHT: 71 IN

## 2024-10-15 DIAGNOSIS — Z78.9 INTOLERANCE OF CONTINUOUS POSITIVE AIRWAY PRESSURE (CPAP) VENTILATION: ICD-10-CM

## 2024-10-15 DIAGNOSIS — Z46.2 ENCOUNTER FOR INTERROGATION OF NEUROSTIMULATOR: ICD-10-CM

## 2024-10-15 DIAGNOSIS — G47.33 SEVERE OBSTRUCTIVE SLEEP APNEA: Primary | ICD-10-CM

## 2024-10-15 DIAGNOSIS — Z96.82 STATUS POST INSERTION OF HYPOGLOSSAL NERVE STIMULATOR: ICD-10-CM

## 2024-10-15 DIAGNOSIS — E66.9 OBESITY (BMI 30-39.9): ICD-10-CM

## 2024-10-15 PROCEDURE — 99213 OFFICE O/P EST LOW 20 MIN: CPT | Performed by: FAMILY MEDICINE

## 2024-10-15 PROCEDURE — G0463 HOSPITAL OUTPT CLINIC VISIT: HCPCS

## 2024-10-15 RX ORDER — OFLOXACIN 3 MG/ML
SOLUTION/ DROPS OPHTHALMIC
COMMUNITY
Start: 2024-09-15

## 2024-10-15 RX ORDER — INSULIN GLARGINE-YFGN 100 [IU]/ML
INJECTION, SOLUTION SUBCUTANEOUS
COMMUNITY
Start: 2024-07-27

## 2024-10-15 NOTE — PROGRESS NOTES
[facility] Note     Chief Complaint:  DARIO     Primary Care Physician: Edgar Briscoe APRN    Taqueria Soto is a 74 y.o.male; 9/13/2023 for study which showed overall AHI of 41.8 events per hour.  Referred to Dr. Vang for hypoglossal nerve stimulator placement.  Presents today for activation.      Implantation date: 4/17/2024     Reports no tongue pain.  No healing issues with incisions.  No pain or drainage.     Functional capacity:1.0Volts     Voltage range 1.0-2.0 V.  Start delay: 30 min  Duration time: 9 h  Pause time: 15 min    8/13/2024: Last visit was 7/9/2024 with ENOCH Thao for follow-up.  He was increased to level 3 at last visit and discussed working up the steps as tolerable.  Start delay was also increased to 45 minutes per patient request.  Patient went back down to level 2 since last visit.  Having pain issues waking up from sleep after procedure on knee.  This is getting better with time however.  Okay with moving up to level 3 today.    10/15/24: 70h of usage per week; at 1.7 V. Not snoring anymore.     Current Medications:    Current Outpatient Medications:     albuterol sulfate  (90 Base) MCG/ACT inhaler, Inhale 2 puffs Every 4 (Four) Hours As Needed for Wheezing or Shortness of Air., Disp: 18 g, Rfl: 5    amLODIPine-benazepril (LOTREL) 10-20 MG per capsule, Take 1 capsule by mouth Daily., Disp: 90 capsule, Rfl: 1    Apoaequorin (Prevagen) 10 MG capsule, Take 1 capsule by mouth Daily. LD 6/7/24, Disp: , Rfl:     atorvastatin (LIPITOR) 40 MG tablet, Take 1 tablet by mouth Daily., Disp: 90 tablet, Rfl: 1    Blood Glucose Monitoring Suppl (FreeStyle Lite) w/Device kit, Use 1 each Daily., Disp: 1 kit, Rfl: 0    brimonidine (ALPHAGAN) 0.15 % ophthalmic solution, Administer 1 drop to both eyes 2 (Two) Times a Day., Disp: , Rfl:     cyclobenzaprine (FLEXERIL) 10 MG tablet, Take 1 tablet by mouth 3 (Three) Times a Day As Needed for Muscle Spasms., Disp: 270 tablet,  Rfl: 1    fexofenadine (ALLEGRA) 180 MG tablet, Take 1 tablet by mouth Daily., Disp: 90 tablet, Rfl: 1    finasteride (PROSCAR) 5 MG tablet, Take 1 tablet by mouth Daily., Disp: 90 tablet, Rfl: 1    gabapentin (NEURONTIN) 300 MG capsule, Take 1 capsule by mouth 3 (Three) Times a Day., Disp: 270 capsule, Rfl: 1    glipizide (GLUCOTROL XL) 2.5 MG 24 hr tablet, , Disp: , Rfl:     glucose blood (FREESTYLE LITE) test strip, Test blood sugar 3 times a day. DX E11.9, Disp: 300 each, Rfl: 3    Insulin Glargine (LANTUS SOLOSTAR) 100 UNIT/ML injection pen, Inject 46 Units under the skin into the appropriate area as directed Daily., Disp: 45 mL, Rfl: 3    Insulin Glargine-yfgn 100 UNIT/ML solution pen-injector, , Disp: , Rfl:     Insulin Pen Needle (Pen Needles) 31G X 5 MM misc, Use 1 each Daily. DX e11.9 used with Lantus daily, Disp: 100 each, Rfl: 1    Lancets (freestyle) lancets, , Disp: , Rfl:     meloxicam (MOBIC) 15 MG tablet, Take 1 tablet by mouth Daily., Disp: 90 tablet, Rfl: 1    montelukast (SINGULAIR) 10 MG tablet, Take 1 tablet by mouth Every Night., Disp: 90 tablet, Rfl: 1    multivitamin with minerals tablet tablet, Take 1 tablet by mouth Daily. LD 6/7/24, Disp: , Rfl:     multivitamins-minerals (PRESERVISION AREDS 2) capsule capsule, Take 1 capsule by mouth Daily. (Patient taking differently: Take 1 capsule by mouth Daily. LD 6/7/24), Disp: 90 capsule, Rfl: 3    ofloxacin (OCUFLOX) 0.3 % ophthalmic solution, , Disp: , Rfl:     omeprazole (priLOSEC) 40 MG capsule, Take 1 capsule by mouth Daily., Disp: 90 capsule, Rfl: 1    Refresh Celluvisc 1 % gel eye gel, Administer 2 drops to both eyes Daily As Needed (dry eyes)., Disp: , Rfl:     SITagliptin-metFORMIN HCl ER (Janumet XR)  MG tablet, Take 2 tablets by mouth Daily., Disp: 180 tablet, Rfl: 1    tamsulosin (FLOMAX) 0.4 MG capsule 24 hr capsule, Take 1 capsule by mouth Daily., Disp: 90 capsule, Rfl: 1    timolol (TIMOPTIC-XR) 0.5 % ophthalmic gel-forming,  "Administer 1 drop to the right eye 2 (Two) Times a Day., Disp: , Rfl:    also entered in Sleep Questionnaire    Patient  has a past medical history of Abnormal EKG, Acid reflux, Arthritis, BPH (benign prostatic hyperplasia), Cataract, Cervical radiculopathy (2019), Cervicalgia (2019), COPD (chronic obstructive pulmonary disease), Deafness, Diabetes mellitus, type 2 (2018), Essential hypertension (2018), Facet arthritis of cervical region (2019), Hematuria, microscopic, Hemorrhoids, High cholesterol, Hyperlipidemia (2018), Insulin dependent type 2 diabetes mellitus, controlled, Left bundle branch block, Macular degeneration, bilateral, Prostatitis, Rupture of left quadriceps tendon, Sinus trouble, and Sleep apnea.    Social History:    Social History     Socioeconomic History    Marital status:    Tobacco Use    Smoking status: Former     Current packs/day: 0.00     Average packs/day: 1 pack/day for 50.0 years (50.0 ttl pk-yrs)     Types: Cigarettes     Start date:      Quit date:      Years since quittin.7    Smokeless tobacco: Never   Vaping Use    Vaping status: Never Used   Substance and Sexual Activity    Alcohol use: Yes     Comment: occas    Drug use: Never    Sexual activity: Defer       Allergies:  Nitroglycerin    Vital Signs:    Vitals:    10/15/24 0900   Pulse: 75   SpO2: 96%   Weight: 106 kg (234 lb 6.4 oz)   Height: 180.3 cm (70.98\")     Body mass index is 32.71 kg/m².    REVIEW OF SYSTEMS.  Full review of systems available on the intake form which is scanned in the media tab.  The relevant positive are noted below  Daytime excessive sleepiness with [place] Sleepiness Scale :Total score: 0   Snoring  See scanned media      Physical exam:  Vitals:    10/15/24 0900   Pulse: 75   SpO2: 96%   Weight: 106 kg (234 lb 6.4 oz)   Height: 180.3 cm (70.98\")    Body mass index is 32.71 kg/m².    HEENT: Head is atraumatic, normocephalic  Eyes: pupils are round " equal and reacting to light and accommodation, conjunctiva normal  Throat: tongue normal  RESPIRATORY SYSTEM: Regular respirations  CARDIOVASULAR SYSTEM: Regular rate  EXTREMITES: No cyanosis, clubbing  NEUROLOGICAL SYSTEM: Oriented x 3, no gross motor defects, gait normal    Impression:  1. Severe obstructive sleep apnea    2. Intolerance of continuous positive airway pressure (CPAP) ventilation    3. Status post insertion of hypoglossal nerve stimulator    4. Encounter for interrogation of neurostimulator    5. Obesity (BMI 30-39.9)          Waveform on with inspire rep Molly.  Continue at 1.7 V.  Follow-up in lab fine-tune study.    Chaka Cabrera MD  Sleep Medicine  10/15/24  09:39 EDT

## 2024-11-12 ENCOUNTER — HOSPITAL ENCOUNTER (OUTPATIENT)
Dept: SLEEP MEDICINE | Facility: HOSPITAL | Age: 74
Discharge: HOME | End: 2024-11-12
Admitting: FAMILY MEDICINE
Payer: MEDICARE

## 2024-11-12 DIAGNOSIS — G47.33 SEVERE OBSTRUCTIVE SLEEP APNEA: ICD-10-CM

## 2024-11-12 DIAGNOSIS — Z46.2 ENCOUNTER FOR INTERROGATION OF NEUROSTIMULATOR: ICD-10-CM

## 2024-11-12 DIAGNOSIS — E66.9 OBESITY (BMI 30-39.9): ICD-10-CM

## 2024-11-12 DIAGNOSIS — Z96.82 STATUS POST INSERTION OF HYPOGLOSSAL NERVE STIMULATOR: ICD-10-CM

## 2024-11-12 DIAGNOSIS — Z78.9 INTOLERANCE OF CONTINUOUS POSITIVE AIRWAY PRESSURE (CPAP) VENTILATION: ICD-10-CM

## 2024-11-12 PROCEDURE — 95810 POLYSOM 6/> YRS 4/> PARAM: CPT

## 2024-11-19 ENCOUNTER — OFFICE VISIT (OUTPATIENT)
Dept: FAMILY MEDICINE CLINIC | Facility: CLINIC | Age: 74
End: 2024-11-19
Payer: MEDICARE

## 2024-11-19 VITALS
SYSTOLIC BLOOD PRESSURE: 136 MMHG | TEMPERATURE: 96.5 F | HEIGHT: 71 IN | BODY MASS INDEX: 32.7 KG/M2 | HEART RATE: 58 BPM | OXYGEN SATURATION: 96 % | DIASTOLIC BLOOD PRESSURE: 64 MMHG | WEIGHT: 233.6 LBS

## 2024-11-19 DIAGNOSIS — H61.21 IMPACTED CERUMEN OF RIGHT EAR: Primary | ICD-10-CM

## 2024-11-19 PROCEDURE — 3078F DIAST BP <80 MM HG: CPT | Performed by: NURSE PRACTITIONER

## 2024-11-19 PROCEDURE — 99212 OFFICE O/P EST SF 10 MIN: CPT | Performed by: NURSE PRACTITIONER

## 2024-11-19 PROCEDURE — 69209 REMOVE IMPACTED EAR WAX UNI: CPT | Performed by: NURSE PRACTITIONER

## 2024-11-19 PROCEDURE — 1125F AMNT PAIN NOTED PAIN PRSNT: CPT | Performed by: NURSE PRACTITIONER

## 2024-11-19 PROCEDURE — 3075F SYST BP GE 130 - 139MM HG: CPT | Performed by: NURSE PRACTITIONER

## 2024-11-19 NOTE — PROGRESS NOTES
"Chief Complaint  Ear Fullness    Subjective         Taqueria Soto presents to [facility]  Patient presents to the office with concerns of right ear fullness.  He states that when he follows up with his hearing aid company he has to ensure that his ears are clean.  He states that he has been having difficulties with his right ear and like to have it checked.  He denies any pain.  He denies any other concerns or complaints at this time.  He does have an appointment next month for his routine follow-up as well as his blood work.  He states that he has all the medications and denies needing refills at this time    Ear Fullness          Objective     Vitals:    11/19/24 0949   BP: 136/64   BP Location: Right arm   Patient Position: Sitting   Cuff Size: Adult   Pulse: 58   Temp: 96.5 °F (35.8 °C)   TempSrc: Temporal   SpO2: 96%   Weight: 106 kg (233 lb 9.6 oz)   Height: 180.3 cm (71\")      Body mass index is 32.58 kg/m².             Physical Exam  Vitals reviewed.   Constitutional:       Appearance: Normal appearance.   HENT:      Right Ear: External ear normal. There is impacted cerumen.      Left Ear: Tympanic membrane, ear canal and external ear normal.   Cardiovascular:      Rate and Rhythm: Normal rate and regular rhythm.      Pulses: Normal pulses.      Heart sounds: Normal heart sounds, S1 normal and S2 normal. No murmur heard.  Pulmonary:      Effort: Pulmonary effort is normal. No respiratory distress.      Breath sounds: Normal breath sounds.   Skin:     General: Skin is warm and dry.   Neurological:      Mental Status: He is alert and oriented to person, place, and time.   Psychiatric:         Attention and Perception: Attention normal.         Mood and Affect: Mood normal.         Behavior: Behavior normal.          Result Review :   The following data was reviewed by: ENOCH Rose on 11/19/2024:      Ear Cerumen Removal    Date/Time: 11/19/2024 1:02 PM    Performed by: " Edgar Briscoe APRN  Authorized by: Edgar Briscoe APRN  Location details: right ear  Patient tolerance: patient tolerated the procedure well with no immediate complications  Comments: Irrigation the TM is without erythema or perforation.  Procedure type: irrigation         Assessment and Plan   Diagnoses and all orders for this visit:    1. Impacted cerumen of right ear (Primary)  -     Ear Cerumen Removal          Follow Up   Return for Next scheduled follow up.  Patient was given instructions and counseling regarding his condition or for health maintenance advice. Please see specific information pulled into the AVS if appropriate.

## 2025-01-13 DIAGNOSIS — Z79.4 TYPE 2 DIABETES MELLITUS WITH HYPERGLYCEMIA, WITH LONG-TERM CURRENT USE OF INSULIN: ICD-10-CM

## 2025-01-13 DIAGNOSIS — E11.65 TYPE 2 DIABETES MELLITUS WITH HYPERGLYCEMIA, WITH LONG-TERM CURRENT USE OF INSULIN: ICD-10-CM

## 2025-01-13 RX ORDER — PEN NEEDLE, DIABETIC 30 GX3/16"
1 NEEDLE, DISPOSABLE MISCELLANEOUS DAILY
Qty: 100 EACH | Refills: 1 | Status: SHIPPED | OUTPATIENT
Start: 2025-01-13

## 2025-01-14 ENCOUNTER — HOSPITAL ENCOUNTER (EMERGENCY)
Facility: HOSPITAL | Age: 75
Discharge: HOME | End: 2025-01-14
Attending: EMERGENCY MEDICINE | Admitting: EMERGENCY MEDICINE
Payer: MEDICARE

## 2025-01-14 ENCOUNTER — APPOINTMENT (OUTPATIENT)
Dept: GENERAL RADIOLOGY | Facility: HOSPITAL | Age: 75
End: 2025-01-14
Payer: MEDICARE

## 2025-01-14 VITALS
DIASTOLIC BLOOD PRESSURE: 59 MMHG | HEIGHT: 71 IN | HEART RATE: 72 BPM | BODY MASS INDEX: 32.72 KG/M2 | OXYGEN SATURATION: 96 % | RESPIRATION RATE: 16 BRPM | WEIGHT: 233.69 LBS | SYSTOLIC BLOOD PRESSURE: 155 MMHG | TEMPERATURE: 98.3 F

## 2025-01-14 DIAGNOSIS — I44.7 LEFT BUNDLE BRANCH BLOCK: Primary | ICD-10-CM

## 2025-01-14 DIAGNOSIS — R00.8 VENTRICULAR BIGEMINY SEEN ON CARDIAC MONITOR: ICD-10-CM

## 2025-01-14 LAB
ALBUMIN SERPL-MCNC: 4.6 G/DL (ref 3.5–5.2)
ALBUMIN/GLOB SERPL: 1.5 G/DL
ALP SERPL-CCNC: 72 U/L (ref 39–117)
ALT SERPL W P-5'-P-CCNC: 15 U/L (ref 1–41)
ANION GAP SERPL CALCULATED.3IONS-SCNC: 12.6 MMOL/L (ref 5–15)
AST SERPL-CCNC: 25 U/L (ref 1–40)
BASOPHILS # BLD AUTO: 0.07 10*3/MM3 (ref 0–0.2)
BASOPHILS NFR BLD AUTO: 0.9 % (ref 0–1.5)
BILIRUB SERPL-MCNC: 0.6 MG/DL (ref 0–1.2)
BUN SERPL-MCNC: 13 MG/DL (ref 8–23)
BUN/CREAT SERPL: 15.7 (ref 7–25)
CALCIUM SPEC-SCNC: 9.5 MG/DL (ref 8.6–10.5)
CHLORIDE SERPL-SCNC: 104 MMOL/L (ref 98–107)
CO2 SERPL-SCNC: 23.4 MMOL/L (ref 22–29)
CREAT SERPL-MCNC: 0.83 MG/DL (ref 0.76–1.27)
DEPRECATED RDW RBC AUTO: 45.2 FL (ref 37–54)
EGFRCR SERPLBLD CKD-EPI 2021: 90.7 ML/MIN/1.73
EOSINOPHIL # BLD AUTO: 0.27 10*3/MM3 (ref 0–0.4)
EOSINOPHIL NFR BLD AUTO: 3.3 % (ref 0.3–6.2)
ERYTHROCYTE [DISTWIDTH] IN BLOOD BY AUTOMATED COUNT: 14.1 % (ref 12.3–15.4)
GLOBULIN UR ELPH-MCNC: 3.1 GM/DL
GLUCOSE SERPL-MCNC: 66 MG/DL (ref 65–99)
HCT VFR BLD AUTO: 44.7 % (ref 37.5–51)
HGB BLD-MCNC: 14.5 G/DL (ref 13–17.7)
HOLD SPECIMEN: NORMAL
HOLD SPECIMEN: NORMAL
IMM GRANULOCYTES # BLD AUTO: 0.02 10*3/MM3 (ref 0–0.05)
IMM GRANULOCYTES NFR BLD AUTO: 0.2 % (ref 0–0.5)
LYMPHOCYTES # BLD AUTO: 1.76 10*3/MM3 (ref 0.7–3.1)
LYMPHOCYTES NFR BLD AUTO: 21.8 % (ref 19.6–45.3)
MAGNESIUM SERPL-MCNC: 1.8 MG/DL (ref 1.6–2.4)
MCH RBC QN AUTO: 28.7 PG (ref 26.6–33)
MCHC RBC AUTO-ENTMCNC: 32.4 G/DL (ref 31.5–35.7)
MCV RBC AUTO: 88.3 FL (ref 79–97)
MONOCYTES # BLD AUTO: 0.95 10*3/MM3 (ref 0.1–0.9)
MONOCYTES NFR BLD AUTO: 11.8 % (ref 5–12)
NEUTROPHILS NFR BLD AUTO: 4.99 10*3/MM3 (ref 1.7–7)
NEUTROPHILS NFR BLD AUTO: 62 % (ref 42.7–76)
NRBC BLD AUTO-RTO: 0 /100 WBC (ref 0–0.2)
PLATELET # BLD AUTO: 206 10*3/MM3 (ref 140–450)
PMV BLD AUTO: 11.8 FL (ref 6–12)
POTASSIUM SERPL-SCNC: 4 MMOL/L (ref 3.5–5.2)
PROT SERPL-MCNC: 7.7 G/DL (ref 6–8.5)
QT INTERVAL: 437 MS
QTC INTERVAL: 461 MS
RBC # BLD AUTO: 5.06 10*6/MM3 (ref 4.14–5.8)
SODIUM SERPL-SCNC: 140 MMOL/L (ref 136–145)
TROPONIN T SERPL HS-MCNC: 8 NG/L
TSH SERPL DL<=0.05 MIU/L-ACNC: 2.5 UIU/ML (ref 0.27–4.2)
WBC NRBC COR # BLD AUTO: 8.06 10*3/MM3 (ref 3.4–10.8)
WHOLE BLOOD HOLD COAG: NORMAL
WHOLE BLOOD HOLD SPECIMEN: NORMAL

## 2025-01-14 PROCEDURE — 93010 ELECTROCARDIOGRAM REPORT: CPT | Performed by: INTERNAL MEDICINE

## 2025-01-14 PROCEDURE — 83735 ASSAY OF MAGNESIUM: CPT

## 2025-01-14 PROCEDURE — 80053 COMPREHEN METABOLIC PANEL: CPT

## 2025-01-14 PROCEDURE — 71045 X-RAY EXAM CHEST 1 VIEW: CPT

## 2025-01-14 PROCEDURE — 84443 ASSAY THYROID STIM HORMONE: CPT

## 2025-01-14 PROCEDURE — 84484 ASSAY OF TROPONIN QUANT: CPT

## 2025-01-14 PROCEDURE — 36415 COLL VENOUS BLD VENIPUNCTURE: CPT

## 2025-01-14 PROCEDURE — 93005 ELECTROCARDIOGRAM TRACING: CPT

## 2025-01-14 PROCEDURE — 93005 ELECTROCARDIOGRAM TRACING: CPT | Performed by: EMERGENCY MEDICINE

## 2025-01-14 PROCEDURE — 99284 EMERGENCY DEPT VISIT MOD MDM: CPT

## 2025-01-14 PROCEDURE — 85025 COMPLETE CBC W/AUTO DIFF WBC: CPT

## 2025-01-14 RX ORDER — SODIUM CHLORIDE 0.9 % (FLUSH) 0.9 %
10 SYRINGE (ML) INJECTION AS NEEDED
Status: DISCONTINUED | OUTPATIENT
Start: 2025-01-14 | End: 2025-01-14 | Stop reason: HOSPADM

## 2025-01-14 NOTE — ED PROVIDER NOTES
Time: 3:23 PM EST  Date of encounter:  1/14/2025  Independent Historian/Clinical History and Information was obtained by:   Patient    History is limited by: N/A    Chief Complaint: Abnormal EKG, low heart rate from primary care provider      History of Present Illness:  Patient is a 74 y.o. year old male who presents to the emergency department for evaluation of abnormal EKG.  Patient has 0 symptoms of chest pain, shortness of air, palpitations.  He had presented to his primary care provider to have his ears cleaned out.  Patient is aware that he has a left bundle branch block chronically.      Patient Care Team  Primary Care Provider: Edgar Briscoe APRN    Past Medical History:     Allergies   Allergen Reactions    Nitroglycerin Other (See Comments)     Heart rate dropped down       Past Medical History:   Diagnosis Date    Abnormal EKG     BBB FOLLOWS GLORIA    Acid reflux     Arthritis     BPH (benign prostatic hyperplasia)     Cataract     h/o    Cervical radiculopathy 01/14/2019    follows /[facility]    Cervicalgia 01/14/2019    COPD (chronic obstructive pulmonary disease)     Dr Dela Cruz follows    Deafness     hearing aids    Diabetes mellitus, type 2 07/24/2018    Essential hypertension 07/24/2018    Facet arthritis of cervical region 01/14/2019    Hematuria, microscopic     Hemorrhoids     High cholesterol     Hyperlipidemia 07/24/2018    Insulin dependent type 2 diabetes mellitus, controlled     Left bundle branch block     cleared by Dr Darby (cardiology)    Macular degeneration, bilateral     follows w/[facility] (Dr Marcum), gets injections    Prostatitis     Unspecified    Rupture of left quadriceps tendon     Sinus trouble     Sleep apnea     INSPIRE IMPLANT     Past Surgical History:   Procedure Laterality Date    CATARACT EXTRACTION WITH INTRAOCULAR LENS IMPLANT Bilateral     EPIDURAL BLOCK      KNEE SURGERY Left     NOSE SURGERY      OTHER SURGICAL HISTORY      INSPIRE IMPLANT FAYE BHATT DR  RYANNE, 5/2024    QUADRICEPS TENDON REPAIR Left 06/12/2024    Procedure: QUADRICEPS TENDON REPAIR;  Surgeon: Emory Henriquez MD;  [place] [facility] [place] [facility] [place]: [place]opedics;  Laterality: Left;    TONSILLECTOMY  1960     Family History   Problem Relation Age of Onset    No Known Problems Mother     Arthritis Father     Sleep apnea Father     Diabetes Sister         Unspecified Type, Mellitus    Malig Hyperthermia Neg Hx        Home Medications:  Prior to Admission medications    Medication Sig Start Date End Date Taking? Authorizing Provider   albuterol sulfate  (90 Base) MCG/ACT inhaler Inhale 2 puffs Every 4 (Four) Hours As Needed for Wheezing or Shortness of Air. 2/12/24   Edgar Briscoe APRN   amLODIPine-benazepril (LOTREL) 10-20 MG per capsule Take 1 capsule by mouth Daily. 8/12/24   Edgar Briscoe APRN   Apoaequorin (Prevagen) 10 MG capsule Take 1 capsule by mouth Daily. LD 6/7/24    Donna Khan MD   atorvastatin (LIPITOR) 40 MG tablet Take 1 tablet by mouth Daily. 8/12/24   Edgar Briscoe APRN   Blood Glucose Monitoring Suppl (FreeStyle Lite) w/Device kit Use 1 each Daily. 7/30/24   Edgar Briscoe APRN   brimonidine (ALPHAGAN) 0.15 % ophthalmic solution Administer 1 drop to both eyes 2 (Two) Times a Day.    Donna Khan MD   cyclobenzaprine (FLEXERIL) 10 MG tablet Take 1 tablet by mouth 3 (Three) Times a Day As Needed for Muscle Spasms. 8/12/24   Edgar Briscoe APRN   fexofenadine (ALLEGRA) 180 MG tablet Take 1 tablet by mouth Daily. 8/12/24   Edgar Briscoe APRN   finasteride (PROSCAR) 5 MG tablet Take 1 tablet by mouth Daily. 8/12/24   Edgar Briscoe APRN   gabapentin (NEURONTIN) 300 MG capsule Take 1 capsule by mouth 3 (Three) Times a Day. 8/12/24   Edgar Briscoe APRN   glipizide (GLUCOTROL XL) 2.5 MG 24 hr tablet  8/12/24   Donna Khan MD   glucose blood (FREESTYLE LITE) test strip Test blood sugar 3 times a day. DX E11.9 9/9/24   Edgar Briscoe APRN   Insulin Glargine  (LANTUS SOLOSTAR) 100 UNIT/ML injection pen Inject 46 Units under the skin into the appropriate area as directed Daily. 24   Edgar Briscoe APRN   Insulin Glargine-yfgn 100 UNIT/ML solution pen-injector  24   Donna Khan MD   Insulin Pen Needle (Pen Needles) 31G X 5 MM misc Use 1 each Daily. DX e11.9 used with Lantus daily 25   Edgar Briscoe APRN   Lancets (freestyle) lancets  24   Donna Khan MD   meloxicam (MOBIC) 15 MG tablet Take 1 tablet by mouth Daily. 24   Edgar Briscoe APRN   montelukast (SINGULAIR) 10 MG tablet Take 1 tablet by mouth Every Night. 24   Edgar Briscoe APRN   multivitamin with minerals tablet tablet Take 1 tablet by mouth Daily. LD 24    Donna Khan MD   multivitamins-minerals (PRESERVISION AREDS 2) capsule capsule Take 1 capsule by mouth Daily.  Patient taking differently: Take 1 capsule by mouth Daily. LD 24   Edgar Briscoe APRN   ofloxacin (OCUFLOX) 0.3 % ophthalmic solution  9/15/24   Donna Khan MD   omeprazole (priLOSEC) 40 MG capsule Take 1 capsule by mouth Daily. 24   Edgar Briscoe APRN   Refresh Celluvisc 1 % gel eye gel Administer 2 drops to both eyes Daily As Needed (dry eyes). 23   Donna Khan MD   SITagliptin-metFORMIN HCl ER (Janumet XR)  MG tablet Take 2 tablets by mouth Daily. 24   Edgar Briscoe APRN   tamsulosin (FLOMAX) 0.4 MG capsule 24 hr capsule Take 1 capsule by mouth Daily. 24   Edgar Briscoe APRN   timolol (TIMOPTIC-XR) 0.5 % ophthalmic gel-forming Administer 1 drop to the right eye 2 (Two) Times a Day.    Donna Khan MD        Social History:   Social History     Tobacco Use    Smoking status: Former     Current packs/day: 0.00     Average packs/day: 1 pack/day for 50.0 years (50.0 ttl pk-yrs)     Types: Cigarettes     Start date:      Quit date:      Years since quittin.0     Passive exposure: Past    Smokeless tobacco: Never  "  Vaping Use    Vaping status: Never Used   Substance Use Topics    Alcohol use: Yes     Comment: occas    Drug use: Never         Review of Systems:  Review of Systems   Constitutional:  Negative for chills and fever.   HENT:  Negative for congestion, rhinorrhea and sore throat.    Eyes:  Negative for photophobia.   Respiratory:  Negative for apnea, cough, chest tightness and shortness of breath.    Cardiovascular:  Negative for chest pain and palpitations.   Gastrointestinal:  Negative for abdominal pain, diarrhea, nausea and vomiting.   Endocrine: Negative.    Genitourinary:  Negative for difficulty urinating and dysuria.   Musculoskeletal:  Negative for back pain, joint swelling and myalgias.   Skin:  Negative for color change and wound.   Allergic/Immunologic: Negative.    Neurological:  Negative for seizures and headaches.   Psychiatric/Behavioral: Negative.     All other systems reviewed and are negative.       Physical Exam:  /59 (BP Location: Left arm, Patient Position: Lying)   Pulse 72   Temp 98.3 °F (36.8 °C) (Oral)   Resp 16   Ht 180.3 cm (71\")   Wt 106 kg (233 lb 11 oz)   SpO2 96%   BMI 32.59 kg/m²     Physical Exam  Vitals and nursing note reviewed.   Constitutional:       General: He is awake.      Appearance: Normal appearance.   HENT:      Head: Normocephalic and atraumatic.      Nose: Nose normal.      Mouth/Throat:      Mouth: Mucous membranes are moist.   Eyes:      Extraocular Movements: Extraocular movements intact.      Pupils: Pupils are equal, round, and reactive to light.   Cardiovascular:      Rate and Rhythm: Normal rate. Rhythm irregular.      Heart sounds: Normal heart sounds.   Pulmonary:      Effort: Pulmonary effort is normal. No respiratory distress.      Breath sounds: Normal breath sounds. No wheezing, rhonchi or rales.   Abdominal:      General: Bowel sounds are normal.      Palpations: Abdomen is soft.      Tenderness: There is no abdominal tenderness. There is no " guarding or rebound.      Comments: No rigidity   Musculoskeletal:         General: No tenderness. Normal range of motion.      Cervical back: Normal range of motion and neck supple.   Skin:     General: Skin is warm and dry.      Coloration: Skin is not jaundiced.   Neurological:      General: No focal deficit present.      Mental Status: He is alert. Mental status is at baseline.   Psychiatric:         Mood and Affect: Mood normal.                    Medical Decision Making:      Comorbidities that affect care:    COPD, type 2 diabetes, left bundle branch block    External Notes reviewed:    Previous Clinic Note: Family practice office visit 11/19/2024.  Description: Cerumen impaction      The following orders were placed and all results were independently analyzed by me:  Orders Placed This Encounter   Procedures    XR Chest 1 View    [place] Draw    Comprehensive Metabolic Panel    Magnesium    High Sensitivity Troponin T    TSH Rfx On Abnormal To Free T4    CBC Auto Differential    NPO Diet NPO Type: Strict NPO    Undress & Gown    Continuous Pulse Oximetry    Cardiology (on-call MD unless specified)    Oxygen Therapy- Nasal Cannula; Titrate 1-6 LPM Per SpO2; 90 - 95%    ECG 12 Lead ED Triage Standing Order; Dysrhythmia    Insert Peripheral IV    CBC & Differential    Green Top (Gel)    Lavender Top    Gold Top - SST    Light Blue Top       Medications Given in the Emergency Department:  Medications   sodium chloride 0.9 % flush 10 mL (has no administration in time range)        ED Course:    ED Course as of 01/14/25 1613   Tue Jan 14, 2025   1525 Current heart rate 65, normal sinus rhythm on the monitor [RP]   1527 ECG 12 Lead ED Triage Standing Order; Dysrhythmia [RP]   1527 I have personally interpreted the EKG today and it shows no evidence of any acute ischemia or heart arrhythmia.  Left bundle branch block, heart rate 69.  Isolated PVC [RP]   1528 Telemetry scan from 6/12/2024 shows wide-complex.  Bundle  branch block on today's EKG does not appear to be new. [RP]      ED Course User Index  [RP] Lisandro Bryant MD       Labs:    Lab Results (last 24 hours)       Procedure Component Value Units Date/Time    CBC & Differential [[id number]]  (Abnormal) Collected: 01/14/25 1419    Specimen: Blood from Arm, Right Updated: 01/14/25 1449    Narrative:      The following orders were created for panel order CBC & Differential.  Procedure                               Abnormality         Status                     ---------                               -----------         ------                     CBC Auto Differential[[id number]]        Abnormal            Final result                 Please view results for these tests on the individual orders.    Comprehensive Metabolic Panel [[id number]] Collected: 01/14/25 1419    Specimen: Blood from Arm, Right Updated: 01/14/25 1517     Glucose 66 mg/dL      BUN 13 mg/dL      Creatinine 0.83 mg/dL      Sodium 140 mmol/L      Potassium 4.0 mmol/L      Chloride 104 mmol/L      CO2 23.4 mmol/L      Calcium 9.5 mg/dL      Total Protein 7.7 g/dL      Albumin 4.6 g/dL      ALT (SGPT) 15 U/L      AST (SGOT) 25 U/L      Alkaline Phosphatase 72 U/L      Total Bilirubin 0.6 mg/dL      Globulin 3.1 gm/dL      A/G Ratio 1.5 g/dL      BUN/Creatinine Ratio 15.7     Anion Gap 12.6 mmol/L      eGFR 90.7 mL/min/1.73     Narrative:      GFR Categories in Chronic Kidney Disease (CKD)      GFR Category          GFR (mL/min/1.73)    Interpretation  G1                     90 or greater         Normal or high (1)  G2                      60-89                Mild decrease (1)  G3a                   45-59                Mild to moderate decrease  G3b                   30-44                Moderate to severe decrease  G4                    15-29                Severe decrease  G5                    14 or less           Kidney failure          (1)In the absence of evidence of kidney disease, neither GFR  category G1 or G2 fulfill the criteria for CKD.    eGFR calculation 2021 CKD-EPI creatinine equation, which does not include race as a factor    Magnesium [[id number]]  (Normal) Collected: 01/14/25 1419    Specimen: Blood from Arm, Right Updated: 01/14/25 1517     Magnesium 1.8 mg/dL     High Sensitivity Troponin T [[id number]]  (Normal) Collected: 01/14/25 1419    Specimen: Blood from Arm, Right Updated: 01/14/25 1517     HS Troponin T 8 ng/L     Narrative:      High Sensitive Troponin T Reference Range:  <14.0 ng/L- Negative Female for AMI  <22.0 ng/L- Negative Male for AMI  >=14 - Abnormal Female indicating possible myocardial injury.  >=22 - Abnormal Male indicating possible myocardial injury.   Clinicians would have to utilize clinical acumen, EKG, Troponin, and serial changes to determine if it is an Acute Myocardial Infarction or myocardial injury due to an underlying chronic condition.         TSH Rfx On Abnormal To Free T4 [[id number]]  (Normal) Collected: 01/14/25 1419    Specimen: Blood from Arm, Right Updated: 01/14/25 1517     TSH 2.500 uIU/mL     CBC Auto Differential [[id number]]  (Abnormal) Collected: 01/14/25 1419    Specimen: Blood from Arm, Right Updated: 01/14/25 1449     WBC 8.06 10*3/mm3      RBC 5.06 10*6/mm3      Hemoglobin 14.5 g/dL      Hematocrit 44.7 %      MCV 88.3 fL      MCH 28.7 pg      MCHC 32.4 g/dL      RDW 14.1 %      RDW-SD 45.2 fl      MPV 11.8 fL      Platelets 206 10*3/mm3      Neutrophil % 62.0 %      Lymphocyte % 21.8 %      Monocyte % 11.8 %      Eosinophil % 3.3 %      Basophil % 0.9 %      Immature Grans % 0.2 %      Neutrophils, Absolute 4.99 10*3/mm3      Lymphocytes, Absolute 1.76 10*3/mm3      Monocytes, Absolute 0.95 10*3/mm3      Eosinophils, Absolute 0.27 10*3/mm3      Basophils, Absolute 0.07 10*3/mm3      Immature Grans, Absolute 0.02 10*3/mm3      nRBC 0.0 /100 WBC              Imaging:    XR Chest 1 View    Result Date: 1/14/2025  XR CHEST 1 VW Date of Exam:  1/14/2025 3:00 PM EST Indication: Dysrhythmia Triage Protocol Comparison: Chest radiograph 3/16/2023 Findings: Hypoglossal nerve stimulator projects over the right chest with leads projecting superiorly out of field-of-view. Mediastinum: Cardiac silhouette appears unchanged and normal in size Lungs: The lungs appear clear without focal consolidation appreciated. Pleura: No pleural effusion or pneumothorax. Bones and soft tissues: No acute, displaced fracture seen.     Impression: No radiographic evidence of acute cardiopulmonary disease. Electronically Signed: Anant Waller  1/14/2025 3:20 PM EST  Workstation ID: [id number]       Differential Diagnosis and Discussion:    Chest Pain:  Based on the patient's signs and symptoms, I considered aortic dissection, myocardial infaction, pulmonary embolism, cardiac tamponade, pericarditis, pneumothorax, musculoskeletal chest pain and other differential diagnosis as an etiology of the patient's chest pain.     PROCEDURES:    Labs were collected in the emergency department and all labs were reviewed and interpreted by me.  X-ray were performed in the emergency department and all X-ray impressions were independently interpreted by me.  An EKG was performed and the EKG was interpreted by me.    ECG 12 Lead ED Triage Standing Order; Dysrhythmia   Preliminary Result   HEART RATE=69  bpm   RR Vkqnrwgu=062  ms   VT Zotoyvcb=138  ms   P Horizontal Axis=-29  deg   P Front Axis=28  deg   QRSD Whnziujk=548  ms   QT Pdzeuhla=636  ms   TCyS=322  ms   QRS Axis=-11  deg   T Wave Axis=147  deg   - ABNORMAL ECG -   Sinus rhythm   Paired ventricular premature complexes   Left bundle branch block   ST elevation secondary to IVCD   Date and Time of Study:2025-01-14 13:57:55          Procedures    MDM                     Patient Care Considerations:    PERC: I used the PERC score to risk stratify the patient for PE and a CT of the chest was considered but ultimately not indicated in today's  visit.      Consultants/Shared Management Plan:    Consultant: I have discussed the case with Dr. Aranda who agrees to consult on the patient.    Social Determinants of Health:    Patient is independent, reliable, and has access to care.       Disposition and Care Coordination:    Discharged: The patient is suitable and stable for discharge with no need for consideration of admission.    I have explained the patient´s condition, diagnoses and treatment plan based on the information available to me at this time. I have answered questions and addressed any concerns. The patient has a good  understanding of the patient´s diagnosis, condition, and treatment plan as can be expected at this point. The vital signs have been stable. The patient´s condition is stable and appropriate for discharge from the emergency department.      The patient will pursue further outpatient evaluation with the primary care physician or other designated or consulting physician as outlined in the discharge instructions. They are agreeable to this plan of care and follow-up instructions have been explained in detail. The patient has received these instructions in written format and has expressed an understanding of the discharge instructions. The patient is aware that any significant change in condition or worsening of symptoms should prompt an immediate return to this or the closest emergency department or call to 911.    Final diagnoses:   Left bundle branch block   Ventricular bigeminy seen on cardiac monitor        ED Disposition       ED Disposition   Discharge    Condition   Stable    Comment   --               This medical record created using voice recognition software.             Lisandro Bryant MD  01/14/25 [id number]

## 2025-01-14 NOTE — ED TRIAGE NOTES
Pt. States he went to have his ears cleaned at , the staff put the pulse ox on his finger and his heart rate was low at 40. They did an EKG and they told him his heart was not beating correctly. They told him to come to the ER.

## 2025-01-14 NOTE — DISCHARGE INSTRUCTIONS
Return to the emergency department immediately if you have any persistent chest pain, difficulty breathing, dizziness or passing out.  Follow-up with a cardiologist Dr. Aranda who I spoke with today about your case.  He did review your EKGs and past medical records and feels comfortable with you going home.

## 2025-01-20 DIAGNOSIS — K21.9 GASTROESOPHAGEAL REFLUX DISEASE, UNSPECIFIED WHETHER ESOPHAGITIS PRESENT: ICD-10-CM

## 2025-01-20 DIAGNOSIS — I10 ESSENTIAL HYPERTENSION: ICD-10-CM

## 2025-01-20 DIAGNOSIS — N40.0 BENIGN PROSTATIC HYPERPLASIA WITHOUT LOWER URINARY TRACT SYMPTOMS: ICD-10-CM

## 2025-01-20 DIAGNOSIS — T78.40XD ALLERGY, SUBSEQUENT ENCOUNTER: ICD-10-CM

## 2025-01-20 RX ORDER — SITAGLIPTIN AND METFORMIN HYDROCHLORIDE 1000; 50 MG/1; MG/1
2 TABLET, FILM COATED, EXTENDED RELEASE ORAL DAILY
Qty: 180 TABLET | Refills: 1 | Status: SHIPPED | OUTPATIENT
Start: 2025-01-20

## 2025-01-20 RX ORDER — MONTELUKAST SODIUM 10 MG/1
10 TABLET ORAL NIGHTLY
Qty: 90 TABLET | Refills: 1 | Status: SHIPPED | OUTPATIENT
Start: 2025-01-20

## 2025-01-20 RX ORDER — TAMSULOSIN HYDROCHLORIDE 0.4 MG/1
1 CAPSULE ORAL DAILY
Qty: 90 CAPSULE | Refills: 1 | Status: SHIPPED | OUTPATIENT
Start: 2025-01-20

## 2025-01-23 ENCOUNTER — OFFICE VISIT (OUTPATIENT)
Dept: CARDIOLOGY | Facility: CLINIC | Age: 75
End: 2025-01-23
Payer: MEDICARE

## 2025-01-23 VITALS
HEIGHT: 71 IN | HEART RATE: 62 BPM | BODY MASS INDEX: 31.99 KG/M2 | SYSTOLIC BLOOD PRESSURE: 141 MMHG | WEIGHT: 228.5 LBS | OXYGEN SATURATION: 96 % | DIASTOLIC BLOOD PRESSURE: 78 MMHG

## 2025-01-23 DIAGNOSIS — E78.2 MIXED HYPERLIPIDEMIA: ICD-10-CM

## 2025-01-23 DIAGNOSIS — I10 ESSENTIAL HYPERTENSION: ICD-10-CM

## 2025-01-23 DIAGNOSIS — I44.7 LBBB (LEFT BUNDLE BRANCH BLOCK): ICD-10-CM

## 2025-01-23 DIAGNOSIS — R00.1 BRADYCARDIA: Primary | ICD-10-CM

## 2025-01-23 PROCEDURE — 1160F RVW MEDS BY RX/DR IN RCRD: CPT | Performed by: INTERNAL MEDICINE

## 2025-01-23 PROCEDURE — 99204 OFFICE O/P NEW MOD 45 MIN: CPT | Performed by: INTERNAL MEDICINE

## 2025-01-23 PROCEDURE — 1159F MED LIST DOCD IN RCRD: CPT | Performed by: INTERNAL MEDICINE

## 2025-01-23 PROCEDURE — 3077F SYST BP >= 140 MM HG: CPT | Performed by: INTERNAL MEDICINE

## 2025-01-23 PROCEDURE — 3078F DIAST BP <80 MM HG: CPT | Performed by: INTERNAL MEDICINE

## 2025-01-23 NOTE — ASSESSMENT & PLAN NOTE
Documented left bundle branch block and EKGs during the past several years.  In 2024, he had a stress echocardiogram done with Dr. Darby, which did not show any evidence of ischemia.  Echo part of the test showed normal LV function with no major valvular problems.  He is completely asymptomatic.  No additional workup needed at this time.

## 2025-01-23 NOTE — ASSESSMENT & PLAN NOTE
EKGs and telemetry from the ER and urgent care showed sinus rhythm, left bundle branch block, frequent PVCs, at times coming in bigeminal fashion.  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  Today, he is in regular rhythm and denies any palpitations.  He is completely asymptomatic.  Pharmacological therapy not indicated at this time.

## 2025-01-23 NOTE — PROGRESS NOTES
CARDIOLOGY INITIAL CONSULT       Chief Complaint  NEW PATIENT (ABNORMAL EKG/) and LBBB (Follow-up from recent ER visit)    Subjective            Taqueria Soto presents to [facility] CARDIOLOGY  History of Present Illness    This is a 74-year-old male with diabetes, hypertension, hyperlipidemia, COPD and hearing impairment.  He is here as a follow-up from recent emergency room visit.    He went to urgent care on 1/14/2024, due to clogged ears, to have it cleaned.  In the clinic, he was noted to be bradycardic with heart rate of 40.  He had an EKG done which showed a bigeminy pattern along with left bundle branch block.  He was advised to get evaluated in the emergency room.  The ears were cleaned before sending to the ER.    EKG and telemetry in the ER confirmed left bundle branch block, frequent PVCs, at times coming in a bigeminal fashion.  Of note, patient had no new symptoms.  He denies any palpitations.  He denies.  Chest pain, dizziness or syncopal episodes.    He was seen by Dr. Darby, cardiologist last year.  He had a stress echocardiogram done in the office which was unremarkable.      Past History:    Medical History:  Past Medical History:   Diagnosis Date    Acid reflux     Arthritis     BPH (benign prostatic hyperplasia)     Cataract     Cervical radiculopathy 01/14/2019    follows /[facility]    Cervicalgia 01/14/2019    COPD (chronic obstructive pulmonary disease)     Dr Dela Cruz follows    Deafness     hearing aids    Diabetes mellitus, type 2 07/24/2018    Essential hypertension 07/24/2018    Facet arthritis of cervical region 01/14/2019    Hematuria, microscopic     Hemorrhoids     Hyperlipidemia 07/24/2018    Insulin dependent type 2 diabetes mellitus, controlled     Left bundle branch block     Macular degeneration, bilateral     follows w/[facility] (Dr Marcum), gets injections    Prostatitis     Unspecified    Rupture of left quadriceps tendon     Sinus trouble     Sleep  apnea     INSPIRE IMPLANT       Family History: family history includes Arthritis in his father; Diabetes in his sister; No Known Problems in his mother; Sleep apnea in his father.     Social History: reports that he quit smoking about 13 years ago. His smoking use included cigarettes. He started smoking about 63 years ago. He has a 50 pack-year smoking history. He has been exposed to tobacco smoke. He has never used smokeless tobacco. He reports current alcohol use. He reports that he does not use drugs.    Allergies: Nitroglycerin    Current Outpatient Medications on File Prior to Visit   Medication Sig    albuterol sulfate  (90 Base) MCG/ACT inhaler Inhale 2 puffs Every 4 (Four) Hours As Needed for Wheezing or Shortness of Air.    amLODIPine-benazepril (LOTREL) 10-20 MG per capsule Take 1 capsule by mouth Daily.    Apoaequorin (Prevagen) 10 MG capsule Take 1 capsule by mouth Daily. LD 6/7/24    atorvastatin (LIPITOR) 40 MG tablet Take 1 tablet by mouth Daily.    Blood Glucose Monitoring Suppl (FreeStyle Lite) w/Device kit Use 1 each Daily.    brimonidine (ALPHAGAN) 0.15 % ophthalmic solution Administer 1 drop to both eyes 2 (Two) Times a Day.    cyclobenzaprine (FLEXERIL) 10 MG tablet Take 1 tablet by mouth 3 (Three) Times a Day As Needed for Muscle Spasms.    fexofenadine (ALLEGRA) 180 MG tablet Take 1 tablet by mouth Daily.    finasteride (PROSCAR) 5 MG tablet Take 1 tablet by mouth Daily.    gabapentin (NEURONTIN) 300 MG capsule Take 1 capsule by mouth 3 (Three) Times a Day.    glipizide (GLUCOTROL XL) 2.5 MG 24 hr tablet     glucose blood (FREESTYLE LITE) test strip Test blood sugar 3 times a day. DX E11.9    Insulin Glargine (LANTUS SOLOSTAR) 100 UNIT/ML injection pen Inject 46 Units under the skin into the appropriate area as directed Daily.    Insulin Glargine-yfgn 100 UNIT/ML solution pen-injector     Insulin Pen Needle (Pen Needles) 31G X 5 MM misc Use 1 each Daily. DX e11.9 used with Lantus daily  "   Lancets (freestyle) lancets     meloxicam (MOBIC) 15 MG tablet Take 1 tablet by mouth Daily.    montelukast (SINGULAIR) 10 MG tablet Take 1 tablet by mouth Every Night.    multivitamin with minerals tablet tablet Take 1 tablet by mouth Daily. LD 6/7/24    multivitamins-minerals (PRESERVISION AREDS 2) capsule capsule Take 1 capsule by mouth Daily. (Patient taking differently: Take 1 capsule by mouth Daily. LD 6/7/24)    ofloxacin (OCUFLOX) 0.3 % ophthalmic solution     omeprazole (priLOSEC) 40 MG capsule Take 1 capsule by mouth Daily.    Refresh Celluvisc 1 % gel eye gel Administer 2 drops to both eyes Daily As Needed (dry eyes).    SITagliptin-metFORMIN HCl ER (Janumet XR)  MG tablet Take 2 tablets by mouth Daily.    tamsulosin (FLOMAX) 0.4 MG capsule 24 hr capsule Take 1 capsule by mouth Daily.    timolol (TIMOPTIC-XR) 0.5 % ophthalmic gel-forming Administer 1 drop to the right eye 2 (Two) Times a Day.     No current facility-administered medications on file prior to visit.          Review of Systems   Constitutional:  Negative for fatigue, unexpected weight gain and unexpected weight loss.   Eyes:  Negative for double vision.   Respiratory:  Negative for cough, shortness of breath and wheezing.    Cardiovascular:  Negative for chest pain, palpitations and leg swelling.   Gastrointestinal:  Negative for abdominal pain, nausea and vomiting.   Endocrine: Negative for cold intolerance, heat intolerance, polydipsia and polyuria.   Musculoskeletal:  Negative for arthralgias and back pain.   Skin:  Negative for color change.   Neurological:  Negative for dizziness, syncope, weakness and headache.   Hematological:  Does not bruise/bleed easily.        Objective     /78   Pulse 62   Ht 180.3 cm (71\")   Wt 104 kg (228 lb 8 oz)   SpO2 96%   BMI 31.87 kg/m²       Physical Exam  Constitutional:       General: He is awake. He is not in acute distress.     Appearance: Normal appearance.   Eyes:      " Extraocular Movements: Extraocular movements intact.      Pupils: Pupils are equal, round, and reactive to light.   Neck:      Thyroid: No thyromegaly.      Vascular: No carotid bruit or JVD.   Cardiovascular:      Rate and Rhythm: Normal rate and regular rhythm.      Chest Wall: PMI is not displaced.      Heart sounds: Normal heart sounds, S1 normal and S2 normal. No murmur heard.     No friction rub. No gallop. No S3 or S4 sounds.   Pulmonary:      Effort: Pulmonary effort is normal. No respiratory distress.      Breath sounds: Normal breath sounds. No wheezing, rhonchi or rales.   Abdominal:      General: Bowel sounds are normal.      Palpations: Abdomen is soft.      Tenderness: There is no abdominal tenderness.   Musculoskeletal:      Cervical back: Neck supple.      Right lower leg: No edema.      Left lower leg: No edema.   Skin:     Nails: There is no clubbing.   Neurological:      General: No focal deficit present.      Mental Status: He is alert and oriented to person, place, and time.           Result Review :     The following data was reviewed by: Kaushal Aranda MD on 01/23/2025:    CMP          4/9/2024    11:49 8/8/2024    09:07 1/14/2025    14:19   CMP   Glucose 63  88  66    BUN 12  10  13    Creatinine 0.97  0.86  0.83    EGFR 80.9  89.7  90.7    Sodium 143  137  140    Potassium 4.3  4.1  4.0    Chloride 108  103  104    Calcium 10.0  9.9  9.5    Total Protein  7.1  7.7    Albumin  4.3  4.6    Globulin  2.8  3.1    Total Bilirubin  0.5  0.6    Alkaline Phosphatase  58  72    AST (SGOT)  22  25    ALT (SGPT)  10  15    Albumin/Globulin Ratio  1.5  1.5    BUN/Creatinine Ratio 12.4  11.6  15.7    Anion Gap 10.7  11.7  12.6      CBC          4/9/2024    11:49 8/8/2024    09:07 1/14/2025    14:19   CBC   WBC 5.47  9.71  8.06    RBC 5.03  5.04  5.06    Hemoglobin 14.2  14.6  14.5    Hematocrit 44.7  44.8  44.7    MCV 88.9  88.9  88.3    MCH 28.2  29.0  28.7    MCHC 31.8  32.6  32.4    RDW 14.9  14.1   14.1    Platelets 195  202  206      TSH          2/2/2024    09:19 8/8/2024    09:07 1/14/2025    14:19   TSH   TSH 2.340  1.760  2.500      Lipid Panel          2/2/2024    09:19 8/8/2024    09:07   Lipid Panel   Total Cholesterol 118  132    Triglycerides 95  88    HDL Cholesterol 40  49    VLDL Cholesterol 18  17    LDL Cholesterol  60  66    LDL/HDL Ratio 1.48  1.33         Data reviewed: Cardiology studies      EKG showed sinus rhythm, left bundle branch block, frequent PVCs.  Telemetry strips from the ER showed episodes of PVCs in bigeminal fashion.                       Assessment and Plan        Diagnoses and all orders for this visit:    1. Bradycardia (Primary)  Assessment & Plan:  EKGs and telemetry from the ER and urgent care showed sinus rhythm, left bundle branch block, frequent PVCs, at times coming in bigeminal fashion.  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  Today, he is in regular rhythm and denies any palpitations.  He is completely asymptomatic.  Pharmacological therapy not indicated at this time.      2. LBBB (left bundle branch block)  Assessment & Plan:  Documented left bundle branch block and EKGs during the past several years.  In 2024, he had a stress echocardiogram done with Dr. Darby, which did not show any evidence of ischemia.  Echo part of the test showed normal LV function with no major valvular problems.  He is completely asymptomatic.  No additional workup needed at this time.      3. Essential hypertension  Assessment & Plan:  Blood pressure well-controlled.  Continue amlodipine/benazepril at the current dose.      4. Mixed hyperlipidemia  Assessment & Plan:  Most recent LDL is 66, which is at goal.  Continue atorvastatin 40 mg daily.             I spent 25 minutes caring for Taqueria on this date of service. This time includes time spent by me in the following activities:reviewing tests, obtaining and/or reviewing a separately obtained history, performing a  medically appropriate examination and/or evaluation , ordering medications, tests, or procedures, and documenting information in the medical record    Follow Up     Return if symptoms worsen or fail to improve.    Patient was given instructions and counseling regarding his condition or for health maintenance advice. Please see specific information pulled into the AVS if appropriate.

## 2025-02-10 ENCOUNTER — LAB (OUTPATIENT)
Dept: LAB | Facility: HOSPITAL | Age: 75
End: 2025-02-10
Payer: MEDICARE

## 2025-02-10 DIAGNOSIS — E78.5 HYPERLIPIDEMIA, UNSPECIFIED HYPERLIPIDEMIA TYPE: ICD-10-CM

## 2025-02-10 DIAGNOSIS — I10 ESSENTIAL HYPERTENSION: ICD-10-CM

## 2025-02-10 DIAGNOSIS — Z79.4 TYPE 2 DIABETES MELLITUS WITH HYPERGLYCEMIA, WITH LONG-TERM CURRENT USE OF INSULIN: ICD-10-CM

## 2025-02-10 DIAGNOSIS — E11.65 TYPE 2 DIABETES MELLITUS WITH HYPERGLYCEMIA, WITH LONG-TERM CURRENT USE OF INSULIN: ICD-10-CM

## 2025-02-10 LAB
ALBUMIN SERPL-MCNC: 4.4 G/DL (ref 3.5–5.2)
ALBUMIN UR-MCNC: 1.6 MG/DL
ALBUMIN/GLOB SERPL: 1.6 G/DL
ALP SERPL-CCNC: 67 U/L (ref 39–117)
ALT SERPL W P-5'-P-CCNC: 16 U/L (ref 1–41)
ANION GAP SERPL CALCULATED.3IONS-SCNC: 10 MMOL/L (ref 5–15)
AST SERPL-CCNC: 25 U/L (ref 1–40)
BILIRUB SERPL-MCNC: 0.6 MG/DL (ref 0–1.2)
BUN SERPL-MCNC: 10 MG/DL (ref 8–23)
BUN/CREAT SERPL: 10.6 (ref 7–25)
CALCIUM SPEC-SCNC: 9.5 MG/DL (ref 8.6–10.5)
CHLORIDE SERPL-SCNC: 105 MMOL/L (ref 98–107)
CHOLEST SERPL-MCNC: 122 MG/DL (ref 0–200)
CO2 SERPL-SCNC: 23 MMOL/L (ref 22–29)
CREAT SERPL-MCNC: 0.94 MG/DL (ref 0.76–1.27)
CREAT UR-MCNC: 51 MG/DL
DEPRECATED RDW RBC AUTO: 41.8 FL (ref 37–54)
EGFRCR SERPLBLD CKD-EPI 2021: 84 ML/MIN/1.73
ERYTHROCYTE [DISTWIDTH] IN BLOOD BY AUTOMATED COUNT: 13 % (ref 12.3–15.4)
GLOBULIN UR ELPH-MCNC: 2.8 GM/DL
GLUCOSE SERPL-MCNC: 103 MG/DL (ref 65–99)
HBA1C MFR BLD: 5.8 % (ref 4.8–5.6)
HCT VFR BLD AUTO: 44.5 % (ref 37.5–51)
HDLC SERPL-MCNC: 48 MG/DL (ref 40–60)
HGB BLD-MCNC: 14.3 G/DL (ref 13–17.7)
LDLC SERPL CALC-MCNC: 61 MG/DL (ref 0–100)
LDLC/HDLC SERPL: 1.28 {RATIO}
MCH RBC QN AUTO: 28.4 PG (ref 26.6–33)
MCHC RBC AUTO-ENTMCNC: 32.1 G/DL (ref 31.5–35.7)
MCV RBC AUTO: 88.3 FL (ref 79–97)
MICROALBUMIN/CREAT UR: 31.4 MG/G (ref 0–29)
PLATELET # BLD AUTO: 190 10*3/MM3 (ref 140–450)
PMV BLD AUTO: 12.6 FL (ref 6–12)
POTASSIUM SERPL-SCNC: 4 MMOL/L (ref 3.5–5.2)
PROT SERPL-MCNC: 7.2 G/DL (ref 6–8.5)
RBC # BLD AUTO: 5.04 10*6/MM3 (ref 4.14–5.8)
SODIUM SERPL-SCNC: 138 MMOL/L (ref 136–145)
T4 FREE SERPL-MCNC: 1.24 NG/DL (ref 0.92–1.68)
TRIGL SERPL-MCNC: 62 MG/DL (ref 0–150)
TSH SERPL DL<=0.05 MIU/L-ACNC: 2.79 UIU/ML (ref 0.27–4.2)
VLDLC SERPL-MCNC: 13 MG/DL (ref 5–40)
WBC NRBC COR # BLD AUTO: 7.32 10*3/MM3 (ref 3.4–10.8)

## 2025-02-10 PROCEDURE — 36415 COLL VENOUS BLD VENIPUNCTURE: CPT

## 2025-02-10 PROCEDURE — 84443 ASSAY THYROID STIM HORMONE: CPT

## 2025-02-10 PROCEDURE — 80061 LIPID PANEL: CPT

## 2025-02-10 PROCEDURE — 83036 HEMOGLOBIN GLYCOSYLATED A1C: CPT

## 2025-02-10 PROCEDURE — 80053 COMPREHEN METABOLIC PANEL: CPT

## 2025-02-10 PROCEDURE — 82043 UR ALBUMIN QUANTITATIVE: CPT

## 2025-02-10 PROCEDURE — 84439 ASSAY OF FREE THYROXINE: CPT

## 2025-02-10 PROCEDURE — 85027 COMPLETE CBC AUTOMATED: CPT

## 2025-02-10 PROCEDURE — 82570 ASSAY OF URINE CREATININE: CPT

## 2025-02-12 ENCOUNTER — OFFICE VISIT (OUTPATIENT)
Dept: FAMILY MEDICINE CLINIC | Facility: CLINIC | Age: 75
End: 2025-02-12
Payer: MEDICARE

## 2025-02-12 ENCOUNTER — HOSPITAL ENCOUNTER (OUTPATIENT)
Dept: GENERAL RADIOLOGY | Facility: HOSPITAL | Age: 75
Discharge: HOME | End: 2025-02-12
Admitting: NURSE PRACTITIONER
Payer: MEDICARE

## 2025-02-12 VITALS
DIASTOLIC BLOOD PRESSURE: 70 MMHG | HEART RATE: 54 BPM | OXYGEN SATURATION: 97 % | BODY MASS INDEX: 32.56 KG/M2 | WEIGHT: 232.6 LBS | HEIGHT: 71 IN | SYSTOLIC BLOOD PRESSURE: 126 MMHG | TEMPERATURE: 97.4 F

## 2025-02-12 DIAGNOSIS — T78.40XD ALLERGY, SUBSEQUENT ENCOUNTER: ICD-10-CM

## 2025-02-12 DIAGNOSIS — M79.672 LEFT FOOT PAIN: ICD-10-CM

## 2025-02-12 DIAGNOSIS — Z79.4 TYPE 2 DIABETES MELLITUS WITH HYPERGLYCEMIA, WITH LONG-TERM CURRENT USE OF INSULIN: Primary | ICD-10-CM

## 2025-02-12 DIAGNOSIS — M79.671 RIGHT FOOT PAIN: ICD-10-CM

## 2025-02-12 DIAGNOSIS — N40.0 BENIGN PROSTATIC HYPERPLASIA WITHOUT LOWER URINARY TRACT SYMPTOMS: ICD-10-CM

## 2025-02-12 DIAGNOSIS — E11.65 TYPE 2 DIABETES MELLITUS WITH HYPERGLYCEMIA, WITH LONG-TERM CURRENT USE OF INSULIN: Primary | ICD-10-CM

## 2025-02-12 DIAGNOSIS — K21.9 GASTROESOPHAGEAL REFLUX DISEASE, UNSPECIFIED WHETHER ESOPHAGITIS PRESENT: ICD-10-CM

## 2025-02-12 DIAGNOSIS — E78.5 HYPERLIPIDEMIA, UNSPECIFIED HYPERLIPIDEMIA TYPE: ICD-10-CM

## 2025-02-12 DIAGNOSIS — F41.9 ANXIETY: ICD-10-CM

## 2025-02-12 DIAGNOSIS — I10 ESSENTIAL HYPERTENSION: ICD-10-CM

## 2025-02-12 PROCEDURE — 99214 OFFICE O/P EST MOD 30 MIN: CPT | Performed by: NURSE PRACTITIONER

## 2025-02-12 PROCEDURE — 1125F AMNT PAIN NOTED PAIN PRSNT: CPT | Performed by: NURSE PRACTITIONER

## 2025-02-12 PROCEDURE — 73630 X-RAY EXAM OF FOOT: CPT

## 2025-02-12 PROCEDURE — 3074F SYST BP LT 130 MM HG: CPT | Performed by: NURSE PRACTITIONER

## 2025-02-12 PROCEDURE — 3078F DIAST BP <80 MM HG: CPT | Performed by: NURSE PRACTITIONER

## 2025-02-12 RX ORDER — SITAGLIPTIN AND METFORMIN HYDROCHLORIDE 1000; 50 MG/1; MG/1
2 TABLET, FILM COATED, EXTENDED RELEASE ORAL DAILY
Qty: 180 TABLET | Refills: 1 | Status: SHIPPED | OUTPATIENT
Start: 2025-02-12

## 2025-02-12 RX ORDER — HYDROXYZINE HYDROCHLORIDE 25 MG/1
25 TABLET, FILM COATED ORAL 3 TIMES DAILY PRN
Qty: 90 TABLET | Refills: 0 | Status: SHIPPED | OUTPATIENT
Start: 2025-02-12

## 2025-02-12 RX ORDER — AMLODIPINE AND BENAZEPRIL HYDROCHLORIDE 10; 20 MG/1; MG/1
1 CAPSULE ORAL DAILY
Qty: 90 CAPSULE | Refills: 1 | Status: SHIPPED | OUTPATIENT
Start: 2025-02-12

## 2025-02-12 RX ORDER — FINASTERIDE 5 MG/1
5 TABLET, FILM COATED ORAL DAILY
Qty: 90 TABLET | Refills: 1 | Status: SHIPPED | OUTPATIENT
Start: 2025-02-12

## 2025-02-12 RX ORDER — FEXOFENADINE HCL 180 MG/1
180 TABLET ORAL DAILY
Qty: 90 TABLET | Refills: 1 | Status: SHIPPED | OUTPATIENT
Start: 2025-02-12

## 2025-02-12 RX ORDER — ASPIRIN 81 MG/1
81 TABLET ORAL DAILY
COMMUNITY
End: 2025-02-12 | Stop reason: SDUPTHER

## 2025-02-12 RX ORDER — MONTELUKAST SODIUM 10 MG/1
10 TABLET ORAL NIGHTLY
Qty: 90 TABLET | Refills: 1 | Status: SHIPPED | OUTPATIENT
Start: 2025-02-12

## 2025-02-12 RX ORDER — OMEPRAZOLE 40 MG/1
40 CAPSULE, DELAYED RELEASE ORAL DAILY
Qty: 90 CAPSULE | Refills: 1 | Status: SHIPPED | OUTPATIENT
Start: 2025-02-12

## 2025-02-12 RX ORDER — TAMSULOSIN HYDROCHLORIDE 0.4 MG/1
1 CAPSULE ORAL DAILY
Qty: 90 CAPSULE | Refills: 1 | Status: SHIPPED | OUTPATIENT
Start: 2025-02-12

## 2025-02-12 RX ORDER — ATORVASTATIN CALCIUM 40 MG/1
40 TABLET, FILM COATED ORAL DAILY
Qty: 90 TABLET | Refills: 1 | Status: SHIPPED | OUTPATIENT
Start: 2025-02-12

## 2025-02-12 RX ORDER — ASPIRIN 81 MG/1
81 TABLET ORAL DAILY
Qty: 90 TABLET | Refills: 1 | Status: SHIPPED | OUTPATIENT
Start: 2025-02-12

## 2025-02-12 NOTE — PROGRESS NOTES
"Chief Complaint  Diabetes (6 month follow up )    Subjective         Taqueria Soto presents to [facility]  Patient presents to the office for 6-month follow-up regarding his diabetes, hypertension hyperlipidemia.  Blood pressure is 126/70.  He denies any chest pain shortness breath palpitations at this time.  I did review recent lab work.  A1c was 5.8%.  I did explain to the patient he is controlling his diabetes very well.  All other labs were unremarkable as well.  Patient does state that he is struggling with anxiety.  He states that he would like to have something that will work very quickly but will also help him rest.  He does state that he is needing refills on his medications.  He does state that he has a nodule on his left foot that is causing some discomfort when walking.  He states that it is at the base of his second toe.  He denies any redness or drainage.  He denies any injury or trauma.  He denies any other concerns or complaints at this time.    Diabetes         Objective     Vitals:    02/12/25 0908   BP: 126/70   BP Location: Right arm   Patient Position: Sitting   Cuff Size: Adult   Pulse: 54   Temp: 97.4 °F (36.3 °C)   TempSrc: Temporal   SpO2: 97%   Weight: 106 kg (232 lb 9.6 oz)   Height: 180.3 cm (71\")      Body mass index is 32.44 kg/m².             Physical Exam  Vitals reviewed.   Constitutional:       Appearance: Normal appearance.   Cardiovascular:      Rate and Rhythm: Normal rate and regular rhythm.      Pulses: Normal pulses.      Heart sounds: Normal heart sounds, S1 normal and S2 normal. No murmur heard.  Pulmonary:      Effort: Pulmonary effort is normal. No respiratory distress.      Breath sounds: Normal breath sounds.   Musculoskeletal:        Feet:    Feet:      Comments: Nodule noted with palpation.  No tenderness noted with palpation  Skin:     General: Skin is warm and dry.   Neurological:      Mental Status: He is alert and oriented to " person, place, and time.   Psychiatric:         Attention and Perception: Attention normal.         Mood and Affect: Mood normal.         Behavior: Behavior normal.          Result Review :   The following data was reviewed by: ENOCH Rose on 02/12/2025:      Procedures    Assessment and Plan   Diagnoses and all orders for this visit:    1. Type 2 diabetes mellitus with hyperglycemia, with long-term current use of insulin (Primary)  -     CBC (No Diff); Future  -     Comprehensive Metabolic Panel; Future  -     Hemoglobin A1c; Future  -     Lipid Panel; Future  -     Microalbumin / Creatinine Urine Ratio - Urine, Clean Catch; Future  -     TSH; Future    2. Hyperlipidemia, unspecified hyperlipidemia type  -     CBC (No Diff); Future  -     Comprehensive Metabolic Panel; Future  -     Lipid Panel; Future  -     atorvastatin (LIPITOR) 40 MG tablet; Take 1 tablet by mouth Daily.  Dispense: 90 tablet; Refill: 1    3. Essential hypertension  -     CBC (No Diff); Future  -     Comprehensive Metabolic Panel; Future  -     Lipid Panel; Future  -     amLODIPine-benazepril (LOTREL) 10-20 MG per capsule; Take 1 capsule by mouth Daily.  Dispense: 90 capsule; Refill: 1  -     tamsulosin (FLOMAX) 0.4 MG capsule 24 hr capsule; Take 1 capsule by mouth Daily.  Dispense: 90 capsule; Refill: 1    4. Benign prostatic hyperplasia without lower urinary tract symptoms  -     finasteride (PROSCAR) 5 MG tablet; Take 1 tablet by mouth Daily.  Dispense: 90 tablet; Refill: 1  -     tamsulosin (FLOMAX) 0.4 MG capsule 24 hr capsule; Take 1 capsule by mouth Daily.  Dispense: 90 capsule; Refill: 1    5. Gastroesophageal reflux disease, unspecified whether esophagitis present  -     omeprazole (priLOSEC) 40 MG capsule; Take 1 capsule by mouth Daily.  Dispense: 90 capsule; Refill: 1  -     SITagliptin-metFORMIN HCl ER (Janumet XR)  MG tablet; Take 2 tablets by mouth Daily.  Dispense: 180 tablet; Refill: 1    6. Allergy, subsequent  encounter  -     montelukast (SINGULAIR) 10 MG tablet; Take 1 tablet by mouth Every Night.  Dispense: 90 tablet; Refill: 1  -     fexofenadine (ALLEGRA) 180 MG tablet; Take 1 tablet by mouth Daily.  Dispense: 90 tablet; Refill: 1    7. Right foot pain    8. Left foot pain  -     XR Foot 3+ View Left; Future    9. Anxiety  -     hydrOXYzine (ATARAX) 25 MG tablet; Take 1 tablet by mouth 3 (Three) Times a Day As Needed for Itching.  Dispense: 90 tablet; Refill: 0    Other orders  -     aspirin 81 MG EC tablet; Take 1 tablet by mouth Daily.  Dispense: 90 tablet; Refill: 1  -     Apoaequorin (Prevagen) 10 MG capsule; Take 1 capsule by mouth Daily. LD 6/7/24  Dispense: 90 capsule; Refill: 1          Follow Up   Return in about 6 months (around 8/12/2025) for Recheck.  Patient was given instructions and counseling regarding his condition or for health maintenance advice. Please see specific information pulled into the AVS if appropriate.

## 2025-02-14 DIAGNOSIS — M79.672 LEFT FOOT PAIN: Primary | ICD-10-CM

## 2025-02-25 ENCOUNTER — TELEPHONE (OUTPATIENT)
Dept: FAMILY MEDICINE CLINIC | Facility: CLINIC | Age: 75
End: 2025-02-25
Payer: MEDICARE

## 2025-02-25 NOTE — TELEPHONE ENCOUNTER
Spoke with patient, per patient this company randomly reached out to them. Advised patient this is more than likely a scam.     Patient is requesting to know if giorgi june could possibly order a back brace due to back pain.

## 2025-02-25 NOTE — TELEPHONE ENCOUNTER
Received order request from [employer] for back and ankle brace.   Did patient request this?

## 2025-02-27 ENCOUNTER — TELEPHONE (OUTPATIENT)
Dept: FAMILY MEDICINE CLINIC | Facility: CLINIC | Age: 75
End: 2025-02-27
Payer: MEDICARE

## 2025-02-27 NOTE — TELEPHONE ENCOUNTER
Left a message to call back about the order for the back brace. I spoke to [facility] and they stated that he didn't think his insurance wouldn't cover the back brace we would need office notes also to go along with order.     Can't use back pain as a DX

## 2025-03-05 ENCOUNTER — OFFICE VISIT (OUTPATIENT)
Dept: FAMILY MEDICINE CLINIC | Facility: CLINIC | Age: 75
End: 2025-03-05
Payer: MEDICARE

## 2025-03-05 VITALS
HEART RATE: 53 BPM | HEIGHT: 71 IN | SYSTOLIC BLOOD PRESSURE: 152 MMHG | TEMPERATURE: 96.5 F | DIASTOLIC BLOOD PRESSURE: 92 MMHG | OXYGEN SATURATION: 96 % | BODY MASS INDEX: 32.9 KG/M2 | WEIGHT: 235 LBS

## 2025-03-05 DIAGNOSIS — R53.83 FATIGUE, UNSPECIFIED TYPE: ICD-10-CM

## 2025-03-05 DIAGNOSIS — F41.9 ANXIETY: Primary | ICD-10-CM

## 2025-03-05 DIAGNOSIS — M54.50 CHRONIC BILATERAL LOW BACK PAIN WITHOUT SCIATICA: ICD-10-CM

## 2025-03-05 DIAGNOSIS — G89.29 CHRONIC BILATERAL LOW BACK PAIN WITHOUT SCIATICA: ICD-10-CM

## 2025-03-05 DIAGNOSIS — G47.30 SLEEP APNEA, UNSPECIFIED TYPE: ICD-10-CM

## 2025-03-05 PROCEDURE — 3080F DIAST BP >= 90 MM HG: CPT | Performed by: NURSE PRACTITIONER

## 2025-03-05 PROCEDURE — 1159F MED LIST DOCD IN RCRD: CPT | Performed by: NURSE PRACTITIONER

## 2025-03-05 PROCEDURE — 1125F AMNT PAIN NOTED PAIN PRSNT: CPT | Performed by: NURSE PRACTITIONER

## 2025-03-05 PROCEDURE — 99213 OFFICE O/P EST LOW 20 MIN: CPT | Performed by: NURSE PRACTITIONER

## 2025-03-05 PROCEDURE — 3077F SYST BP >= 140 MM HG: CPT | Performed by: NURSE PRACTITIONER

## 2025-03-05 PROCEDURE — 1160F RVW MEDS BY RX/DR IN RCRD: CPT | Performed by: NURSE PRACTITIONER

## 2025-03-05 RX ORDER — BUSPIRONE HYDROCHLORIDE 7.5 MG/1
7.5 TABLET ORAL 2 TIMES DAILY
Qty: 180 TABLET | Refills: 0 | Status: SHIPPED | OUTPATIENT
Start: 2025-03-05

## 2025-03-05 NOTE — PROGRESS NOTES
"Chief Complaint  Back Pain    Subjective         Taqueria Soto presents to [facility]  Presents to the office to discuss his low back pain.  Patient states that this is a chronic issue and he like to discuss getting a lumbar back brace.  He denies any radiation of the pain.  He denies any urinary or bowel incontinence.  Patient states that it is difficult to get into a comfortable position due to his low back pain.   Patient's blood pressure is slightly elevated upon arrival today is 152/92.  He states he has been take his medication as prescribed.  Patient does state that he did not was taking pseudoephedrine due to nasal congestion.  I did explain that that can cause his blood pressure to elevate.  I did ask him to continue to monitor his blood pressure  Patient does state that the hydroxyzine is not working for his anxiety.  I did discuss switching him to buspirone.  He states he is willing to try this.    Patient does state that he has been more fatigued than usual.  He states that he gets approximately 6 hours of sleep that is interrupted occasionally through the night due to having to go to the bathroom.  Patient is wife does state that he is snoring through the night.  Patient has inspire for sleep apnea.  I did explain that this may not be working as he thought therefore he is not getting the rest when he is asleep.  I did explain I would refer him back to sleep center for further evaluation      Back Pain         Objective     Vitals:    03/05/25 1035   BP: 152/92   BP Location: Right arm   Patient Position: Sitting   Cuff Size: Adult   Pulse: 53   Temp: 96.5 °F (35.8 °C)   TempSrc: Temporal   SpO2: 96%   Weight: 107 kg (235 lb)   Height: 180.3 cm (71\")      Body mass index is 32.78 kg/m².             Physical Exam  Vitals reviewed.   Constitutional:       Appearance: Normal appearance.   Cardiovascular:      Rate and Rhythm: Normal rate and regular rhythm.      Pulses: " Normal pulses.      Heart sounds: Normal heart sounds, S1 normal and S2 normal. No murmur heard.  Pulmonary:      Effort: Pulmonary effort is normal. No respiratory distress.      Breath sounds: Normal breath sounds.   Musculoskeletal:      Lumbar back: Tenderness present.        Back:    Skin:     General: Skin is warm and dry.   Neurological:      Mental Status: He is alert and oriented to person, place, and time.   Psychiatric:         Attention and Perception: Attention normal.         Mood and Affect: Mood normal.         Behavior: Behavior normal.          Result Review :   The following data was reviewed by: ENOCH Rose on 03/05/2025:      Procedures    Assessment and Plan   Diagnoses and all orders for this visit:    1. Anxiety (Primary)  -     busPIRone (BUSPAR) 7.5 MG tablet; Take 1 tablet by mouth 2 (Two) Times a Day.  Dispense: 180 tablet; Refill: 0    2. Chronic bilateral low back pain without sciatica  Comments:  back brace will be ordered for support    3. Fatigue, unspecified type  -     Ambulatory Referral to Sleep Medicine    4. Sleep apnea, unspecified type  -     Ambulatory Referral to Sleep Medicine          Follow Up   Return if symptoms worsen or fail to improve.  Patient was given instructions and counseling regarding his condition or for health maintenance advice. Please see specific information pulled into the AVS if appropriate.

## 2025-03-19 ENCOUNTER — OFFICE VISIT (OUTPATIENT)
Dept: PULMONOLOGY | Facility: CLINIC | Age: 75
End: 2025-03-19
Payer: MEDICARE

## 2025-03-19 VITALS
SYSTOLIC BLOOD PRESSURE: 131 MMHG | OXYGEN SATURATION: 97 % | WEIGHT: 235 LBS | BODY MASS INDEX: 32.9 KG/M2 | TEMPERATURE: 97 F | DIASTOLIC BLOOD PRESSURE: 52 MMHG | HEART RATE: 35 BPM | HEIGHT: 71 IN | RESPIRATION RATE: 18 BRPM

## 2025-03-19 DIAGNOSIS — G47.33 OBSTRUCTIVE SLEEP APNEA: ICD-10-CM

## 2025-03-19 DIAGNOSIS — J41.8 MIXED SIMPLE AND MUCOPURULENT CHRONIC BRONCHITIS: Primary | ICD-10-CM

## 2025-03-19 PROCEDURE — 99213 OFFICE O/P EST LOW 20 MIN: CPT | Performed by: INTERNAL MEDICINE

## 2025-03-19 PROCEDURE — 3078F DIAST BP <80 MM HG: CPT | Performed by: INTERNAL MEDICINE

## 2025-03-19 PROCEDURE — 1159F MED LIST DOCD IN RCRD: CPT | Performed by: INTERNAL MEDICINE

## 2025-03-19 PROCEDURE — 1160F RVW MEDS BY RX/DR IN RCRD: CPT | Performed by: INTERNAL MEDICINE

## 2025-03-19 PROCEDURE — 3075F SYST BP GE 130 - 139MM HG: CPT | Performed by: INTERNAL MEDICINE

## 2025-03-19 NOTE — PROGRESS NOTES
Pulmonary Office Follow-up    Subjective     Taqueria Soto is seen today at the office for   Chief Complaint   Patient presents with    Follow-up     6 Months    Mixed simple and mucopurulent chronic bronchitis         HPI  Taqueria Soto is a 75 y.o. male with a PMH significant for COPD and obstructive sleep apnea on inspire presents for follow-up patient has been doing well and denies any significant shortness of breath he takes only albuterol inhaler as needed patient has lately been having problems with his inspire he has an appointment with the sleep      Tobacco use history:  Former smoker      Patient Active Problem List   Diagnosis    Type 1 diabetes mellitus    Hyperlipidemia    Essential hypertension    Benign prostatic hyperplasia    Cervical radiculopathy    Diabetes mellitus without complication    Chronic obstructive pulmonary disease    Hypertensive heart disease without congestive heart failure    Hemorrhoids    Gastroesophageal reflux disease    DDD (degenerative disc disease), cervical    Cervical spondylosis without myelopathy    Quadriceps tendon rupture    Quadriceps tendon rupture, left, initial encounter    Bradycardia    LBBB (left bundle branch block)       Review of Systems  Review of Systems   All other systems reviewed and are negative.    As described in the HPI. Otherwise, remainder of ROS (14 systems) were negative.    Medications, Allergies, Social, and Family Histories reviewed as per EMR.    Result Review :            Objective     Vitals:    03/19/25 0941   BP: 131/52   Pulse: (!) 35   Resp: 18   Temp: 97 °F (36.1 °C)   SpO2: 97%         03/19/25  0941   Weight: 107 kg (235 lb)       Physical Exam  Vitals and nursing note reviewed.   Constitutional:       Appearance: Normal appearance.   HENT:      Head: Normocephalic and atraumatic.      Nose: Nose normal.      Mouth/Throat:      Pharynx: Oropharynx is clear.   Eyes:      Extraocular Movements: Extraocular movements intact.       Conjunctiva/sclera: Conjunctivae normal.      Pupils: Pupils are equal, round, and reactive to light.   Cardiovascular:      Rate and Rhythm: Normal rate and regular rhythm.      Pulses: Normal pulses.      Heart sounds: Normal heart sounds.   Pulmonary:      Effort: Pulmonary effort is normal.      Breath sounds: Normal breath sounds.   Musculoskeletal:         General: Normal range of motion.      Cervical back: Normal range of motion and neck supple.   Skin:     General: Skin is warm.      Capillary Refill: Capillary refill takes 2 to 3 seconds.   Neurological:      Mental Status: He is alert and oriented to person, place, and time.   Psychiatric:         Mood and Affect: Mood normal.         Behavior: Behavior normal.         XR Foot 3+ View Left  Result Date: 2/13/2025  Impression: No acute osseous abnormalities are identified. Suggest a follow-up MRI examination of the left foot if there is clinical suspicion of a soft tissue mass. Electronically Signed: Harjeet Oliva MD  2/13/2025 4:40 PM EST  Workstation ID: [id number]    XR Chest 1 View  Result Date: 1/14/2025  Impression: No radiographic evidence of acute cardiopulmonary disease. Electronically Signed: Anant Waller  1/14/2025 3:20 PM EST  Workstation ID: [id number]       Assessment & Plan     Diagnoses and all orders for this visit:    1. Mixed simple and mucopurulent chronic bronchitis (Primary)    2. Obstructive sleep apnea         Discussion/ Recommendations:   Chest x-ray reviewed no infiltrates noted  Patient is advised to follow-up with sleep physician he has an appointment next month  Advise regular exercise and weight reduction his BMI is 32.78  Continue albuterol as needed  Vaccinations discussed and recommended             Return in about 6 months (around 9/19/2025).          This document has been electronically signed by Rickey Malone MD on March 19, 2025 09:51 EDT

## 2025-04-10 ENCOUNTER — OFFICE VISIT (OUTPATIENT)
Dept: SLEEP MEDICINE | Facility: HOSPITAL | Age: 75
End: 2025-04-10
Payer: MEDICARE

## 2025-04-10 VITALS — HEIGHT: 71 IN | WEIGHT: 241.2 LBS | BODY MASS INDEX: 33.77 KG/M2 | OXYGEN SATURATION: 98 % | HEART RATE: 57 BPM

## 2025-04-10 DIAGNOSIS — E66.9 OBESITY (BMI 30-39.9): ICD-10-CM

## 2025-04-10 DIAGNOSIS — G47.33 SEVERE OBSTRUCTIVE SLEEP APNEA: Primary | ICD-10-CM

## 2025-04-10 DIAGNOSIS — Z46.2 ENCOUNTER FOR INTERROGATION OF NEUROSTIMULATOR: ICD-10-CM

## 2025-04-10 DIAGNOSIS — Z78.9 INTOLERANCE OF CONTINUOUS POSITIVE AIRWAY PRESSURE (CPAP) VENTILATION: ICD-10-CM

## 2025-04-10 DIAGNOSIS — Z96.82 STATUS POST INSERTION OF HYPOGLOSSAL NERVE STIMULATOR: ICD-10-CM

## 2025-04-10 PROCEDURE — 1159F MED LIST DOCD IN RCRD: CPT | Performed by: FAMILY MEDICINE

## 2025-04-10 PROCEDURE — 1160F RVW MEDS BY RX/DR IN RCRD: CPT | Performed by: FAMILY MEDICINE

## 2025-04-10 PROCEDURE — G0463 HOSPITAL OUTPT CLINIC VISIT: HCPCS

## 2025-04-10 PROCEDURE — 99214 OFFICE O/P EST MOD 30 MIN: CPT | Performed by: FAMILY MEDICINE

## 2025-04-10 NOTE — PROGRESS NOTES
[facility] Note     Chief Complaint:  DARIO     Primary Care Physician: Edgar Briscoe APRN    Taqueria Soto is a 75 y.o.male; 9/13/2023 for study which showed overall AHI of 41.8 events per hour.  Referred to Dr. Vang for hypoglossal nerve stimulator placement.  Presents today for activation.      Implantation date: 4/17/2024     Reports no tongue pain.  No healing issues with incisions.  No pain or drainage.     Functional capacity:1.0Volts     Voltage range 1.0-2.0 V.  Start delay: 30 min  Duration time: 9 h  Pause time: 15 min    8/13/2024: Last visit was 7/9/2024 with ENO[facility] Thao for follow-up.  He was increased to level 3 at last visit and discussed working up the steps as tolerable.  Start delay was also increased to 45 minutes per patient request.  Patient went back down to level 2 since last visit.  Having pain issues waking up from sleep after procedure on knee.  This is getting better with time however.  Okay with moving up to level 3 today.    10/15/24: 70h of usage per week; at 1.7 V. Not snoring anymore.    4/10/2025: 2.2 V.  Has not had follow-up since in-lab PSG which showed therapeutic at 2.0 V with AHI 5.4 and REM sleep noted.    Current Medications:    Current Outpatient Medications:     albuterol sulfate  (90 Base) MCG/ACT inhaler, Inhale 2 puffs Every 4 (Four) Hours As Needed for Wheezing or Shortness of Air., Disp: 18 g, Rfl: 5    amLODIPine-benazepril (LOTREL) 10-20 MG per capsule, Take 1 capsule by mouth Daily., Disp: 90 capsule, Rfl: 1    Apoaequorin (Prevagen) 10 MG capsule, Take 1 capsule by mouth Daily. LD 6/7/24, Disp: 90 capsule, Rfl: 1    aspirin 81 MG EC tablet, Take 1 tablet by mouth Daily., Disp: 90 tablet, Rfl: 1    atorvastatin (LIPITOR) 40 MG tablet, Take 1 tablet by mouth Daily., Disp: 90 tablet, Rfl: 1    Blood Glucose Monitoring Suppl (FreeStyle Lite) w/Device kit, Use 1 each Daily., Disp: 1 kit, Rfl: 0    brimonidine (ALPHAGAN) 0.15 %  ophthalmic solution, Administer 1 drop to both eyes 2 (Two) Times a Day., Disp: , Rfl:     busPIRone (BUSPAR) 7.5 MG tablet, Take 1 tablet by mouth 2 (Two) Times a Day., Disp: 180 tablet, Rfl: 0    cyclobenzaprine (FLEXERIL) 10 MG tablet, Take 1 tablet by mouth 3 (Three) Times a Day As Needed for Muscle Spasms., Disp: 270 tablet, Rfl: 1    fexofenadine (ALLEGRA) 180 MG tablet, Take 1 tablet by mouth Daily., Disp: 90 tablet, Rfl: 1    finasteride (PROSCAR) 5 MG tablet, Take 1 tablet by mouth Daily., Disp: 90 tablet, Rfl: 1    gabapentin (NEURONTIN) 300 MG capsule, Take 1 capsule by mouth 3 (Three) Times a Day., Disp: 270 capsule, Rfl: 1    glucose blood (FREESTYLE LITE) test strip, Test blood sugar 3 times a day. DX E11.9, Disp: 300 each, Rfl: 3    Insulin Glargine (LANTUS SOLOSTAR) 100 UNIT/ML injection pen, Inject 46 Units under the skin into the appropriate area as directed Daily., Disp: 45 mL, Rfl: 3    Insulin Pen Needle (Pen Needles) 31G X 5 MM misc, Use 1 each Daily. DX e11.9 used with Lantus daily, Disp: 100 each, Rfl: 1    Lancets (freestyle) lancets, , Disp: , Rfl:     meloxicam (MOBIC) 15 MG tablet, Take 1 tablet by mouth Daily., Disp: 90 tablet, Rfl: 1    montelukast (SINGULAIR) 10 MG tablet, Take 1 tablet by mouth Every Night., Disp: 90 tablet, Rfl: 1    multivitamin with minerals tablet tablet, Take 1 tablet by mouth Daily. LD 6/7/24, Disp: , Rfl:     multivitamins-minerals (PRESERVISION AREDS 2) capsule capsule, Take 1 capsule by mouth Daily. (Patient taking differently: Take 1 capsule by mouth Daily. LD 6/7/24), Disp: 90 capsule, Rfl: 3    ofloxacin (OCUFLOX) 0.3 % ophthalmic solution, , Disp: , Rfl:     omeprazole (priLOSEC) 40 MG capsule, Take 1 capsule by mouth Daily., Disp: 90 capsule, Rfl: 1    Refresh Celluvisc 1 % gel eye gel, Administer 2 drops to both eyes Daily As Needed (dry eyes)., Disp: , Rfl:     SITagliptin-metFORMIN HCl ER (Janumet XR)  MG tablet, Take 2 tablets by mouth Daily.,  "Disp: 180 tablet, Rfl: 1    tamsulosin (FLOMAX) 0.4 MG capsule 24 hr capsule, Take 1 capsule by mouth Daily., Disp: 90 capsule, Rfl: 1    timolol (TIMOPTIC-XR) 0.5 % ophthalmic gel-forming, Administer 1 drop to the right eye 2 (Two) Times a Day., Disp: , Rfl:    also entered in Sleep Questionnaire    Patient  has a past medical history of Acid reflux, Arthritis, BPH (benign prostatic hyperplasia), Cataract, Cervical radiculopathy (2019), Cervicalgia (2019), COPD (chronic obstructive pulmonary disease), Deafness, Diabetes mellitus, type 2 (2018), Essential hypertension (2018), Facet arthritis of cervical region (2019), Hematuria, microscopic, Hemorrhoids, Hyperlipidemia (2018), Insulin dependent type 2 diabetes mellitus, controlled, Left bundle branch block, Macular degeneration, bilateral, Prostatitis, Rupture of left quadriceps tendon, Sinus trouble, and Sleep apnea.    Social History:    Social History     Socioeconomic History    Marital status:    Tobacco Use    Smoking status: Former     Current packs/day: 0.00     Average packs/day: 1 pack/day for 50.0 years (50.0 ttl pk-yrs)     Types: Cigarettes     Start date:      Quit date:      Years since quittin.2     Passive exposure: Past    Smokeless tobacco: Never   Vaping Use    Vaping status: Never Used   Substance and Sexual Activity    Alcohol use: Yes     Comment: occas    Drug use: Never    Sexual activity: Defer       Allergies:  Nitroglycerin    Vital Signs:    Vitals:    04/10/25 0900   Pulse: 57   SpO2: 98%   Weight: 109 kg (241 lb 3.2 oz)   Height: 180.3 cm (70.98\")     Body mass index is 33.66 kg/m².    REVIEW OF SYSTEMS.  Full review of systems available on the intake form which is scanned in the media tab.  The relevant positive are noted below  Daytime excessive sleepiness with [place] Sleepiness Scale :Total score: 0   Snoring  See scanned media      Physical exam:  Vitals:    04/10/25 0900 " "  Pulse: 57   SpO2: 98%   Weight: 109 kg (241 lb 3.2 oz)   Height: 180.3 cm (70.98\")    Body mass index is 33.66 kg/m².    HEENT: Head is atraumatic, normocephalic  Eyes: pupils are round equal and reacting to light and accommodation, conjunctiva normal  Throat: tongue normal  RESPIRATORY SYSTEM: Regular respirations  CARDIOVASULAR SYSTEM: Regular rate  EXTREMITES: No cyanosis, clubbing  NEUROLOGICAL SYSTEM: Oriented x 3, no gross motor defects, gait normal    Impression:  1. Severe obstructive sleep apnea    2. Intolerance of continuous positive airway pressure (CPAP) ventilation    3. Status post insertion of hypoglossal nerve stimulator    4. Encounter for interrogation of neurostimulator    5. Obesity (BMI 30-39.9)        Reset parameters to 1.8-2.2 V amplitude of 2.0 V.  Waveform on with inspire rep Molly.  Return to clinic in 6 months for follow-up or sooner if needed.    Chaka Cabrera MD  Sleep Medicine  04/10/25  10:09 EDT      "

## 2025-05-27 ENCOUNTER — TELEPHONE (OUTPATIENT)
Dept: FAMILY MEDICINE CLINIC | Facility: CLINIC | Age: 75
End: 2025-05-27
Payer: MEDICARE

## 2025-05-27 NOTE — TELEPHONE ENCOUNTER
[place] [facility] [place]   Start livalo 4g and Zetia 10mg daily

## 2025-07-07 DIAGNOSIS — F41.9 ANXIETY: ICD-10-CM

## 2025-07-08 RX ORDER — BUSPIRONE HYDROCHLORIDE 7.5 MG/1
7.5 TABLET ORAL 2 TIMES DAILY
Qty: 180 TABLET | Refills: 0 | Status: SHIPPED | OUTPATIENT
Start: 2025-07-08

## 2025-07-08 NOTE — TELEPHONE ENCOUNTER
Upcoming Appts  With Family Medicine ([facility] Rose)  08/13/2025 at 9:30 AM  Last Office Visit - This Dept  3/5/2025 Edgar Briscoe APRN

## 2025-07-21 DIAGNOSIS — K21.9 GASTROESOPHAGEAL REFLUX DISEASE, UNSPECIFIED WHETHER ESOPHAGITIS PRESENT: ICD-10-CM

## 2025-07-21 DIAGNOSIS — T78.40XD ALLERGY, SUBSEQUENT ENCOUNTER: ICD-10-CM

## 2025-07-21 DIAGNOSIS — M54.12 CERVICAL RADICULOPATHY: ICD-10-CM

## 2025-07-21 RX ORDER — OMEPRAZOLE 40 MG/1
40 CAPSULE, DELAYED RELEASE ORAL DAILY
Qty: 90 CAPSULE | Refills: 1 | Status: SHIPPED | OUTPATIENT
Start: 2025-07-21

## 2025-07-21 RX ORDER — MONTELUKAST SODIUM 10 MG/1
10 TABLET ORAL NIGHTLY
Qty: 90 TABLET | Refills: 1 | Status: SHIPPED | OUTPATIENT
Start: 2025-07-21

## 2025-08-11 ENCOUNTER — LAB (OUTPATIENT)
Dept: LAB | Facility: HOSPITAL | Age: 75
End: 2025-08-11
Payer: MEDICARE

## 2025-08-11 DIAGNOSIS — I10 ESSENTIAL HYPERTENSION: ICD-10-CM

## 2025-08-11 DIAGNOSIS — Z12.5 SCREENING FOR PROSTATE CANCER: ICD-10-CM

## 2025-08-11 DIAGNOSIS — E78.5 HYPERLIPIDEMIA, UNSPECIFIED HYPERLIPIDEMIA TYPE: ICD-10-CM

## 2025-08-11 DIAGNOSIS — Z79.4 TYPE 2 DIABETES MELLITUS WITH HYPERGLYCEMIA, WITH LONG-TERM CURRENT USE OF INSULIN: ICD-10-CM

## 2025-08-11 DIAGNOSIS — E11.65 TYPE 2 DIABETES MELLITUS WITH HYPERGLYCEMIA, WITH LONG-TERM CURRENT USE OF INSULIN: ICD-10-CM

## 2025-08-11 LAB
ALBUMIN SERPL-MCNC: 4.2 G/DL (ref 3.5–5.2)
ALBUMIN UR-MCNC: <1.2 MG/DL
ALBUMIN/GLOB SERPL: 1.5 G/DL
ALP SERPL-CCNC: 76 U/L (ref 39–117)
ALT SERPL W P-5'-P-CCNC: 19 U/L (ref 1–41)
ANION GAP SERPL CALCULATED.3IONS-SCNC: 10.3 MMOL/L (ref 5–15)
AST SERPL-CCNC: 31 U/L (ref 1–40)
BILIRUB SERPL-MCNC: 0.4 MG/DL (ref 0–1.2)
BUN SERPL-MCNC: 9 MG/DL (ref 8–23)
BUN/CREAT SERPL: 8.8 (ref 7–25)
CALCIUM SPEC-SCNC: 9.5 MG/DL (ref 8.6–10.5)
CHLORIDE SERPL-SCNC: 106 MMOL/L (ref 98–107)
CHOLEST SERPL-MCNC: 137 MG/DL (ref 0–200)
CO2 SERPL-SCNC: 23.7 MMOL/L (ref 22–29)
CREAT SERPL-MCNC: 1.02 MG/DL (ref 0.76–1.27)
CREAT UR-MCNC: 63 MG/DL
DEPRECATED RDW RBC AUTO: 42.5 FL (ref 37–54)
EGFRCR SERPLBLD CKD-EPI 2021: 75.7 ML/MIN/1.73
ERYTHROCYTE [DISTWIDTH] IN BLOOD BY AUTOMATED COUNT: 13.5 % (ref 12.3–15.4)
GLOBULIN UR ELPH-MCNC: 2.8 GM/DL
GLUCOSE SERPL-MCNC: 99 MG/DL (ref 65–99)
HBA1C MFR BLD: 5.9 % (ref 4.8–5.6)
HCT VFR BLD AUTO: 43.8 % (ref 37.5–51)
HDLC SERPL-MCNC: 48 MG/DL (ref 40–60)
HGB BLD-MCNC: 14.5 G/DL (ref 13–17.7)
LDLC SERPL CALC-MCNC: 70 MG/DL (ref 0–100)
LDLC/HDLC SERPL: 1.42 {RATIO}
MCH RBC QN AUTO: 29.1 PG (ref 26.6–33)
MCHC RBC AUTO-ENTMCNC: 33.1 G/DL (ref 31.5–35.7)
MCV RBC AUTO: 87.8 FL (ref 79–97)
MICROALBUMIN/CREAT UR: NORMAL MG/G{CREAT}
PLATELET # BLD AUTO: 190 10*3/MM3 (ref 140–450)
PMV BLD AUTO: 11.7 FL (ref 6–12)
POTASSIUM SERPL-SCNC: 4.6 MMOL/L (ref 3.5–5.2)
PROT SERPL-MCNC: 7 G/DL (ref 6–8.5)
RBC # BLD AUTO: 4.99 10*6/MM3 (ref 4.14–5.8)
SODIUM SERPL-SCNC: 140 MMOL/L (ref 136–145)
TRIGL SERPL-MCNC: 105 MG/DL (ref 0–150)
TSH SERPL DL<=0.05 MIU/L-ACNC: 3.19 UIU/ML (ref 0.27–4.2)
VLDLC SERPL-MCNC: 19 MG/DL (ref 5–40)
WBC NRBC COR # BLD AUTO: 6.88 10*3/MM3 (ref 3.4–10.8)

## 2025-08-11 PROCEDURE — 80053 COMPREHEN METABOLIC PANEL: CPT

## 2025-08-11 PROCEDURE — 36415 COLL VENOUS BLD VENIPUNCTURE: CPT

## 2025-08-11 PROCEDURE — 84443 ASSAY THYROID STIM HORMONE: CPT

## 2025-08-11 PROCEDURE — 85027 COMPLETE CBC AUTOMATED: CPT

## 2025-08-11 PROCEDURE — 83036 HEMOGLOBIN GLYCOSYLATED A1C: CPT

## 2025-08-11 PROCEDURE — G0103 PSA SCREENING: HCPCS

## 2025-08-11 PROCEDURE — 80061 LIPID PANEL: CPT

## 2025-08-11 PROCEDURE — 82043 UR ALBUMIN QUANTITATIVE: CPT

## 2025-08-11 PROCEDURE — 82570 ASSAY OF URINE CREATININE: CPT

## 2025-08-12 ENCOUNTER — TELEPHONE (OUTPATIENT)
Dept: FAMILY MEDICINE CLINIC | Facility: CLINIC | Age: 75
End: 2025-08-12
Payer: MEDICARE

## 2025-08-13 ENCOUNTER — OFFICE VISIT (OUTPATIENT)
Dept: FAMILY MEDICINE CLINIC | Facility: CLINIC | Age: 75
End: 2025-08-13
Payer: MEDICARE

## 2025-08-13 VITALS
BODY MASS INDEX: 32.93 KG/M2 | SYSTOLIC BLOOD PRESSURE: 128 MMHG | WEIGHT: 235.2 LBS | TEMPERATURE: 98.3 F | HEIGHT: 71 IN | DIASTOLIC BLOOD PRESSURE: 70 MMHG | OXYGEN SATURATION: 97 % | HEART RATE: 75 BPM

## 2025-08-13 DIAGNOSIS — Z00.00 MEDICARE ANNUAL WELLNESS VISIT, SUBSEQUENT: ICD-10-CM

## 2025-08-13 DIAGNOSIS — Z79.4 TYPE 2 DIABETES MELLITUS WITH HYPERGLYCEMIA, WITH LONG-TERM CURRENT USE OF INSULIN: ICD-10-CM

## 2025-08-13 DIAGNOSIS — E11.65 TYPE 2 DIABETES MELLITUS WITH HYPERGLYCEMIA, WITH LONG-TERM CURRENT USE OF INSULIN: ICD-10-CM

## 2025-08-13 DIAGNOSIS — Z12.5 SCREENING FOR PROSTATE CANCER: Primary | ICD-10-CM

## 2025-08-13 DIAGNOSIS — K21.9 GASTROESOPHAGEAL REFLUX DISEASE, UNSPECIFIED WHETHER ESOPHAGITIS PRESENT: ICD-10-CM

## 2025-08-13 DIAGNOSIS — N40.0 BENIGN PROSTATIC HYPERPLASIA WITHOUT LOWER URINARY TRACT SYMPTOMS: ICD-10-CM

## 2025-08-13 DIAGNOSIS — T78.40XD ALLERGY, SUBSEQUENT ENCOUNTER: ICD-10-CM

## 2025-08-13 DIAGNOSIS — I10 ESSENTIAL HYPERTENSION: ICD-10-CM

## 2025-08-13 DIAGNOSIS — E78.5 HYPERLIPIDEMIA, UNSPECIFIED HYPERLIPIDEMIA TYPE: ICD-10-CM

## 2025-08-13 LAB — PSA SERPL-MCNC: 0.14 NG/ML (ref 0–4)

## 2025-08-13 RX ORDER — FEXOFENADINE HCL 180 MG/1
180 TABLET ORAL DAILY
Qty: 90 TABLET | Refills: 1 | Status: SHIPPED | OUTPATIENT
Start: 2025-08-13

## 2025-08-13 RX ORDER — AMLODIPINE AND BENAZEPRIL HYDROCHLORIDE 10; 20 MG/1; MG/1
1 CAPSULE ORAL DAILY
Qty: 90 CAPSULE | Refills: 1 | Status: SHIPPED | OUTPATIENT
Start: 2025-08-13

## 2025-08-13 RX ORDER — PEN NEEDLE, DIABETIC 30 GX3/16"
1 NEEDLE, DISPOSABLE MISCELLANEOUS DAILY
Qty: 100 EACH | Refills: 1 | Status: SHIPPED | OUTPATIENT
Start: 2025-08-13

## 2025-08-13 RX ORDER — SITAGLIPTIN AND METFORMIN HYDROCHLORIDE 1000; 50 MG/1; MG/1
2 TABLET, FILM COATED, EXTENDED RELEASE ORAL DAILY
Qty: 180 TABLET | Refills: 1 | Status: SHIPPED | OUTPATIENT
Start: 2025-08-13

## 2025-08-13 RX ORDER — ATORVASTATIN CALCIUM 40 MG/1
40 TABLET, FILM COATED ORAL DAILY
Qty: 90 TABLET | Refills: 1 | Status: SHIPPED | OUTPATIENT
Start: 2025-08-13

## 2025-08-13 RX ORDER — TAMSULOSIN HYDROCHLORIDE 0.4 MG/1
1 CAPSULE ORAL DAILY
Qty: 90 CAPSULE | Refills: 1 | Status: SHIPPED | OUTPATIENT
Start: 2025-08-13

## 2025-08-13 RX ORDER — BLOOD-GLUCOSE METER
KIT MISCELLANEOUS
Qty: 300 EACH | Refills: 3 | Status: SHIPPED | OUTPATIENT
Start: 2025-08-13

## (undated) DEVICE — LINER SURG CANSTR SXN S/RIGD 1500CC

## (undated) DEVICE — SUT SILK 3/0 RB1 CR8 18IN C053D

## (undated) DEVICE — UNDERCAST PADDING: Brand: DEROYAL

## (undated) DEVICE — NDL RIZA/RIBE W/GRASP/LP 16G

## (undated) DEVICE — SOL IRR H2O BTL 1000ML STRL

## (undated) DEVICE — CATH IV INSYTE AUTOGARD BLD/CONTRL SHLD 20G 1.16IN

## (undated) DEVICE — RMT NEUROSTIM INSPIRESLEEPREMOTE SLEEP/APNEA MDL/2580

## (undated) DEVICE — ANTI-SKID MAT 10"X16": Brand: MEDLINE INDUSTRIES, INC.

## (undated) DEVICE — SUT MNCRYL 4/0 PS2 18 IN

## (undated) DEVICE — PROXIMATE RH ROTATING HEAD SKIN STAPLERS (35 WIDE) CONTAINS 35 STAINLESS STEEL STAPLES: Brand: PROXIMATE

## (undated) DEVICE — TBG PENCL TELESCP MEGADYNE SMOKE EVAC 10FT

## (undated) DEVICE — GAUZE,SPONGE,4"X4",16PLY,STRL,LF,10/TRAY: Brand: MEDLINE

## (undated) DEVICE — PROBE 8225401 5PK SD-SD BIPOL STIM ROHS

## (undated) DEVICE — DISPOSABLE BIPOLAR CODE, 12' (3.66 M): Brand: CONMED

## (undated) DEVICE — BNDG ESMARK STRL 6INX12FT LF

## (undated) DEVICE — CVR PROB ULTRASND CIVFLEX GEN/PURP TELESCP/FOLD 5.5X58IN LF

## (undated) DEVICE — CUFF TOURNI 1BLADDER 1PRT 30IN STRL

## (undated) DEVICE — SPONGE,DISSECTOR,K,XRAY,9/16"X1/4",STRL: Brand: MEDLINE

## (undated) DEVICE — GLOVE,SURG,SENSICARE SLT,LF,PF,8.5: Brand: MEDLINE

## (undated) DEVICE — Device

## (undated) DEVICE — PCH INST SURG INVISISHIELD 2PCKT

## (undated) DEVICE — SUT VIC 3/0 SH CR8 18IN J864D

## (undated) DEVICE — HARMONIC FOCUS SHEARS 9CM LENGTH + ADAPTIVE TISSUE TECHNOLOGY FOR USE WITH BLUE HAND PIECE ONLY: Brand: HARMONIC FOCUS

## (undated) DEVICE — NDL HYPO PRECISIONGLIDE REG 25G 1 1/2

## (undated) DEVICE — EXTREMITY-LF: Brand: MEDLINE INDUSTRIES, INC.

## (undated) DEVICE — INTENDED TO BE USED TO OCCLUDE, RETRACT AND IDENTIFY ARTERIES, VEINS, TENDONS AND NERVES IN SURGICAL PROCEDURES: Brand: STERION®  VESSEL LOOP

## (undated) DEVICE — ADHS SKIN SURG TISS VISC PREMIERPRO EXOFIN HI/VISC FAST/DRY

## (undated) DEVICE — INTENDED USE FOR SURGICAL MARKING ON INTACT SKIN, ALSO PROVIDES A PERMANENT METHOD OF IDENTIFYING OBJECTS IN THE OPERATING ROOM: Brand: WRITESITE® REGULAR TIP SKIN MARKER

## (undated) DEVICE — DRAPE,TOWEL,LARGE,INVISISHIELD: Brand: MEDLINE

## (undated) DEVICE — TOWEL,OR,DSP,ST,BLUE,STD,4/PK,20PK/CS: Brand: MEDLINE

## (undated) DEVICE — INTENDED FOR TISSUE SEPARATION, AND OTHER PROCEDURES THAT REQUIRE A SHARP SURGICAL BLADE TO PUNCTURE OR CUT.: Brand: BARD-PARKER ® CARBON RIB-BACK BLADES

## (undated) DEVICE — BNDG ELAS CO-FLEX SLF ADHR 6IN 5YD LF STRL

## (undated) DEVICE — SUT VIC 2/0 CT1 36IN

## (undated) DEVICE — NDL HYPO PRECISIONGLIDE/REG 18G 11/2 PNK

## (undated) DEVICE — DRESSING,GAUZE,XEROFORM,CURAD,1"X8",ST: Brand: CURAD

## (undated) DEVICE — GLV SURG SENSICARE PI PF LF 7 GRN STRL

## (undated) DEVICE — SUT SILK 2/0 SH CR8 18IN CR8 C012D

## (undated) DEVICE — 3M™ STERI-DRAPE™ FLUOROSCOPE DRAPE, 10 PER CARTON / 4 CARTONS PER CASE, 1012: Brand: STERI-DRAPE™

## (undated) DEVICE — ELECTRODE 8227304 5PK PRASS PR 18MM ROHS

## (undated) DEVICE — SHEET, ORTHO, SPLIT, STERILE: Brand: MEDLINE

## (undated) DEVICE — PREMIUM WET SKIN PREP TRAY: Brand: MEDLINE INDUSTRIES, INC.

## (undated) DEVICE — ANTIBACTERIAL VIOLET BRAIDED (POLYGLACTIN 910), SYNTHETIC ABSORBABLE SURGICAL SUTURE: Brand: COATED VICRYL

## (undated) DEVICE — 3M™ IOBAN™ 2 ANTIMICROBIAL INCISE DRAPE 6650EZ: Brand: IOBAN™ 2

## (undated) DEVICE — GLV SURG SENSICARE PI LF PF 7.5

## (undated) DEVICE — IMMOB KN 3PNL DLX CANVS 24IN BLU

## (undated) DEVICE — PK PROC MAJ 90

## (undated) DEVICE — PENCL ES MEGADINE EZ/CLEAN BUTN W/HOLSTR 10FT

## (undated) DEVICE — GLOVE,SURG,SENSICARE SLT,LF,PF,7: Brand: MEDLINE

## (undated) DEVICE — SYR LL TP 10ML STRL

## (undated) DEVICE — GLV SURG BIOGEL LTX PF 8 1/2

## (undated) DEVICE — SUT SILK 3/0 TIES 18IN A184H